# Patient Record
Sex: FEMALE | Race: WHITE | Employment: UNEMPLOYED | URBAN - NONMETROPOLITAN AREA
[De-identification: names, ages, dates, MRNs, and addresses within clinical notes are randomized per-mention and may not be internally consistent; named-entity substitution may affect disease eponyms.]

---

## 2019-10-09 ENCOUNTER — HOSPITAL ENCOUNTER (EMERGENCY)
Age: 59
Discharge: HOME | End: 2019-10-09
Payer: COMMERCIAL

## 2019-10-09 VITALS
WEIGHT: 214 LBS | RESPIRATION RATE: 16 BRPM | BODY MASS INDEX: 34.39 KG/M2 | OXYGEN SATURATION: 98 % | HEIGHT: 66 IN | DIASTOLIC BLOOD PRESSURE: 60 MMHG | SYSTOLIC BLOOD PRESSURE: 126 MMHG | TEMPERATURE: 97 F | HEART RATE: 63 BPM

## 2019-10-09 DIAGNOSIS — J01.00 ACUTE NON-RECURRENT MAXILLARY SINUSITIS: Primary | ICD-10-CM

## 2019-10-09 PROCEDURE — 99203 OFFICE O/P NEW LOW 30 MIN: CPT | Performed by: NURSE PRACTITIONER

## 2019-10-09 PROCEDURE — 99202 OFFICE O/P NEW SF 15 MIN: CPT

## 2019-10-09 RX ORDER — LISINOPRIL 5 MG/1
5 TABLET ORAL DAILY
COMMUNITY
End: 2021-05-12 | Stop reason: ALTCHOICE

## 2019-10-09 RX ORDER — FLUTICASONE PROPIONATE 50 MCG
1 SPRAY, SUSPENSION (ML) NASAL DAILY
Qty: 1 BOTTLE | Refills: 0 | Status: SHIPPED | OUTPATIENT
Start: 2019-10-09 | End: 2021-05-12 | Stop reason: ALTCHOICE

## 2019-10-09 RX ORDER — AMOXICILLIN AND CLAVULANATE POTASSIUM 875; 125 MG/1; MG/1
1 TABLET, FILM COATED ORAL 2 TIMES DAILY
Qty: 14 TABLET | Refills: 0 | Status: SHIPPED | OUTPATIENT
Start: 2019-10-09 | End: 2019-10-16

## 2019-10-09 RX ORDER — DEXTROMETHORPHAN HYDROBROMIDE AND PROMETHAZINE HYDROCHLORIDE 15; 6.25 MG/5ML; MG/5ML
5 SYRUP ORAL 4 TIMES DAILY PRN
Qty: 120 ML | Refills: 0 | Status: SHIPPED | OUTPATIENT
Start: 2019-10-09 | End: 2019-10-16

## 2019-10-09 ASSESSMENT — ENCOUNTER SYMPTOMS
SHORTNESS OF BREATH: 0
RHINORRHEA: 1
SORE THROAT: 1
EYE ITCHING: 0
SINUS PRESSURE: 1
COUGH: 1
EYE DISCHARGE: 0
NAUSEA: 0
DIARRHEA: 0
VOMITING: 0
SINUS PAIN: 1

## 2019-10-09 ASSESSMENT — PAIN DESCRIPTION - LOCATION: LOCATION: FACE

## 2019-10-09 ASSESSMENT — PAIN SCALES - GENERAL: PAINLEVEL_OUTOF10: 8

## 2020-02-18 ENCOUNTER — INITIAL CONSULT (OUTPATIENT)
Dept: NEUROLOGY | Age: 60
End: 2020-02-18
Payer: COMMERCIAL

## 2020-02-18 ENCOUNTER — HOSPITAL ENCOUNTER (OUTPATIENT)
Dept: CT IMAGING | Age: 60
Discharge: HOME | End: 2020-02-18
Payer: COMMERCIAL

## 2020-02-18 ENCOUNTER — HOSPITAL ENCOUNTER (OUTPATIENT)
Dept: MRI IMAGING | Age: 60
Discharge: HOME | End: 2020-02-18
Payer: COMMERCIAL

## 2020-02-18 VITALS
BODY MASS INDEX: 37.09 KG/M2 | SYSTOLIC BLOOD PRESSURE: 124 MMHG | WEIGHT: 230.8 LBS | HEIGHT: 66 IN | HEART RATE: 68 BPM | DIASTOLIC BLOOD PRESSURE: 86 MMHG

## 2020-02-18 PROCEDURE — 99204 OFFICE O/P NEW MOD 45 MIN: CPT | Performed by: PSYCHIATRY & NEUROLOGY

## 2020-02-18 PROCEDURE — 3209999900 MRI COMPARISON OF OUTSIDE FILMS

## 2020-02-18 PROCEDURE — 3209999900 CT COMPARISON OF OUTSIDE FILMS

## 2020-02-18 PROCEDURE — 3017F COLORECTAL CA SCREEN DOC REV: CPT | Performed by: PSYCHIATRY & NEUROLOGY

## 2020-02-18 PROCEDURE — G8427 DOCREV CUR MEDS BY ELIG CLIN: HCPCS | Performed by: PSYCHIATRY & NEUROLOGY

## 2020-02-18 PROCEDURE — 1036F TOBACCO NON-USER: CPT | Performed by: PSYCHIATRY & NEUROLOGY

## 2020-02-18 PROCEDURE — G8484 FLU IMMUNIZE NO ADMIN: HCPCS | Performed by: PSYCHIATRY & NEUROLOGY

## 2020-02-18 PROCEDURE — G8417 CALC BMI ABV UP PARAM F/U: HCPCS | Performed by: PSYCHIATRY & NEUROLOGY

## 2020-02-18 RX ORDER — ACETAMINOPHEN 160 MG
TABLET,DISINTEGRATING ORAL DAILY
COMMUNITY

## 2020-02-18 RX ORDER — ONDANSETRON HYDROCHLORIDE 8 MG/1
8 TABLET, FILM COATED ORAL EVERY 8 HOURS PRN
COMMUNITY

## 2020-02-18 RX ORDER — ATORVASTATIN CALCIUM 20 MG/1
20 TABLET, FILM COATED ORAL DAILY
COMMUNITY

## 2020-02-18 RX ORDER — MECLIZINE HCL 12.5 MG/1
12.5 TABLET ORAL 3 TIMES DAILY PRN
COMMUNITY

## 2020-02-18 RX ORDER — MULTIVIT WITH MINERALS/LUTEIN
1000 TABLET ORAL 2 TIMES DAILY
COMMUNITY

## 2020-02-18 RX ORDER — TRAZODONE HYDROCHLORIDE 50 MG/1
100 TABLET ORAL NIGHTLY
COMMUNITY

## 2020-02-18 RX ORDER — SENNA PLUS 8.6 MG/1
1 TABLET ORAL 2 TIMES DAILY
COMMUNITY

## 2020-02-18 RX ORDER — OMEPRAZOLE 20 MG/1
20 CAPSULE, DELAYED RELEASE ORAL DAILY PRN
COMMUNITY
End: 2021-08-26 | Stop reason: ALTCHOICE

## 2020-02-18 NOTE — PATIENT INSTRUCTIONS
1. Obtain records from previous neurologist, Dr. Mayra Morley at [facility] and Dr. Scot Farnsworth at [facility]  2. MRI brain W/WO contrast  3. EEG  4. Lamictal level  No driving, swimming, operating heavy machinery or compromising heights until event free for 6 months. Report any new events. Call if any questions. Call with any new symptoms or concerns. Follow up in 1 month.

## 2020-02-18 NOTE — LETTER
135 [facility]  200 [facility] [address] [place] [address]  Dept: [phone]  Dept Fax: [phone]  Loc: [phone]    Malinda Mireles MD        2/18/2020      Patient:  Elvia Guadarrama  MRN:  [id number]  YOB: 1960  Date of Visit:  2/18/2020    Dear Dr. Erasmo Brasher,    Thank you for referring Danny Rowland to me for consultation. Please see attached visit summary with my findings. If you have any questions, please do not hesitate to call me.       Sincerely,         Malinda Mireles MD

## 2020-02-27 ENCOUNTER — HOSPITAL ENCOUNTER (OUTPATIENT)
Dept: NEUROLOGY | Age: 60
Discharge: HOME | End: 2020-02-27
Payer: COMMERCIAL

## 2020-02-27 PROCEDURE — 95816 EEG AWAKE AND DROWSY: CPT

## 2020-02-27 NOTE — PROGRESS NOTES
(FLONASE) 50 MCG/ACT nasal spray 1 spray by Nasal route daily for 7 days 10/9/19 10/16/19  ÁNGELA Madsen - CNP   therapeutic multivitamin-minerals [facility]) tablet Take 1 tablet by mouth daily. Historical Provider, MD   Thiamine HCl (VITAMIN B-1) 100 MG tablet Take 100 mg by mouth daily. Historical Provider, MD   Calcium Carbonate (CALTRATE 600 PO) Take  by mouth 3 times daily. Historical Provider, MD   LamoTRIgine (LAMICTAL PO) Take 200 mg by mouth 2 times daily     Historical Provider, MD   clonazePAM (KLONOPIN) 1 MG tablet Take 1 mg by mouth nightly as needed. Historical Provider, MD   LORazepam (ATIVAN) 1 MG tablet Take 1 mg by mouth as needed.       Historical Provider, MD       Technician: Rennie Carrel 2/27/2020

## 2020-02-28 NOTE — PROCEDURES
[address] [id number]                          ELECTROENCEPHALOGRAM REPORT    PATIENT NAME: Cecily Durham                  :        1960  MED REC NO:   [id number]                           ROOM:  ACCOUNT NO:   [de-identified]                           ADMIT DATE: 2020  PROVIDER:     Nadine Pruitt. Evelina Albarran MD    DATE OF EE2020    REFERRING PROVIDER:  Beckie Alcantar CNP    CLINICAL HISTORY:  A 82-year-old female with history of seizures and  migraines complaining of dizziness. Medications are Ativan, Klonopin, Lamictal, Antivert. CLINICAL INTERPRETATION:  This is a 17-channel EEG performed without  sleep deprivation. Hyperventilation and photic stimulation were  performed. The patient is described as alert. Background rhythm activity is noted to be 10 Hz in the posterior  parietal area, symmetric, well modulated, attenuates with eye opening. Hyperventilation was performed for 3 minutes without abnormality. Photic stimulation was performed with driving seen through some of the  frequencies. There was no evidence of epileptiform activity appreciated  throughout this recording. IMPRESSION:  This is a normal EEG. There was no evidence of  epileptiform activity appreciated.         Jaylin Roblero MD    D: 2020 10:25:45       T: 2020 10:36:05     [email or web address]  Job#: [id number]     Doc#: [id number]    CC:

## 2020-03-06 NOTE — PROGRESS NOTES
Chief Complaint   Patient presents with    Consultation     Seizures, vertigo         Catihe Cavanaugh is a 64 y.o. female who presents today for evaluation of seizure since 2009 that has progressively increased in frequency since October 2019. She has been seen by neurology in the past and is establishing local care. Symptoms are moderate and intermittent. Onset is sudden. Modifiers are unknown. She describes her typical seizure is she can feel funny and then blacks out and has entire body shaking. She denies any tongue biting, no incontinence of bowel or bladder. She denies any injury. She is on Lamictal 200 mg twice a day. She has also been on Dilantin in the past. Since October, she feels she is having more episodes of lapses in memory and staring. Her last episode was 1 week ago. Frequency is a couple of times a week. She is unsure of the duration of the episode. Her sleep is poor and interrupted, she wakes up feeling well rested. She occasionally takes daytime naps. She does not snore. She denies any alcohol use. She drives. She is unsure when her last EEG was. EEG done 6/8/12 was normal. She  denies chest pain. No shortness of breath, no neck pain. No vision changes. No dysphagia. No fever. No rash. No weight loss. History provided by patient. Past Medical History:   Diagnosis Date    Anxiety     Depression     Seizures ([facility] [address]        There is no problem list on file for this patient.       Allergies   Allergen Reactions    Aspirin-Acetaminophen-Caffeine Anaphylaxis     excedrin    Percocet [Oxycodone-Acetaminophen]        Current Outpatient Medications   Medication Sig Dispense Refill    atorvastatin (LIPITOR) 10 MG tablet Take 10 mg by mouth daily      traZODone (DESYREL) 50 MG tablet Take 100 mg by mouth nightly      Ascorbic Acid (VITAMIN C) 1000 MG tablet Take 1,000 mg by mouth 2 times daily      Cholecalciferol (VITAMIN D3) 50 MCG (2000 UT) CAPS Take by mouth daily      ensure compliance. She drives and she was counseled on the importance of refraining from driving,, swimming operating heavy machinery and compromising heights. The patient asked questions reflecting understanding and agreed with the following plan. Plan      1. Obtain records from previous neurologist, Dr. Wade Hernandez at [facility] and Dr. Rodney Quigley at [facility]  2. MRI brain W/WO contrast  3. EEG  4. Lamictal level  5. No driving, swimming, operating heavy machinery or compromising heights until event free for 6 months. Report any new events. Call if any questions. 6. Call with any new symptoms or concerns. 7. Follow up in 1 month.        Brianna Shields MD

## 2020-03-10 ENCOUNTER — NURSE ONLY (OUTPATIENT)
Dept: LAB | Age: 60
End: 2020-03-10

## 2020-03-11 LAB — LAMOTRIGINE LEVEL: 9.9 UG/ML (ref 2.5–15)

## 2020-03-24 ENCOUNTER — HOSPITAL ENCOUNTER (OUTPATIENT)
Dept: MRI IMAGING | Age: 60
Discharge: HOME | End: 2020-03-24
Payer: COMMERCIAL

## 2020-03-24 PROCEDURE — 70553 MRI BRAIN STEM W/O & W/DYE: CPT

## 2020-03-24 PROCEDURE — A9579 GAD-BASE MR CONTRAST NOS,1ML: HCPCS | Performed by: NURSE PRACTITIONER

## 2020-03-24 PROCEDURE — 6360000004 HC RX CONTRAST MEDICATION: Performed by: NURSE PRACTITIONER

## 2020-03-24 RX ADMIN — GADOTERIDOL 20 ML: 279.3 INJECTION, SOLUTION INTRAVENOUS at 11:00

## 2020-03-25 ENCOUNTER — OFFICE VISIT (OUTPATIENT)
Dept: NEUROLOGY | Age: 60
End: 2020-03-25
Payer: COMMERCIAL

## 2020-03-25 VITALS
DIASTOLIC BLOOD PRESSURE: 82 MMHG | HEART RATE: 60 BPM | SYSTOLIC BLOOD PRESSURE: 118 MMHG | HEIGHT: 66 IN | WEIGHT: 226 LBS | BODY MASS INDEX: 36.32 KG/M2

## 2020-03-25 PROCEDURE — G8417 CALC BMI ABV UP PARAM F/U: HCPCS | Performed by: PSYCHIATRY & NEUROLOGY

## 2020-03-25 PROCEDURE — 99213 OFFICE O/P EST LOW 20 MIN: CPT | Performed by: PSYCHIATRY & NEUROLOGY

## 2020-03-25 PROCEDURE — 1036F TOBACCO NON-USER: CPT | Performed by: PSYCHIATRY & NEUROLOGY

## 2020-03-25 PROCEDURE — G8484 FLU IMMUNIZE NO ADMIN: HCPCS | Performed by: PSYCHIATRY & NEUROLOGY

## 2020-03-25 PROCEDURE — 3017F COLORECTAL CA SCREEN DOC REV: CPT | Performed by: PSYCHIATRY & NEUROLOGY

## 2020-03-25 PROCEDURE — G8427 DOCREV CUR MEDS BY ELIG CLIN: HCPCS | Performed by: PSYCHIATRY & NEUROLOGY

## 2020-03-25 NOTE — PATIENT INSTRUCTIONS
Obtain records from previous neurologist, Dr. Shashi Barry at [facility] and Dr. Darwin Crespo at [facility] with Lamictal 200 mg twice a day  No driving, swimming, operating heavy machinery or compromising heights until event free for 6 months. Report any new events. Call if any questions. Report any new seizure or spells   Follow up in 3 months or sooner if needed. Call if any questions or concerns.
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
## 2020-08-04 ENCOUNTER — HOSPITAL ENCOUNTER (OUTPATIENT)
Age: 60
Discharge: HOME | End: 2020-08-04
Payer: COMMERCIAL

## 2020-08-04 ENCOUNTER — OFFICE VISIT (OUTPATIENT)
Dept: NEUROLOGY | Age: 60
End: 2020-08-04
Payer: COMMERCIAL

## 2020-08-04 VITALS
WEIGHT: 231.2 LBS | HEART RATE: 78 BPM | HEIGHT: 66 IN | SYSTOLIC BLOOD PRESSURE: 130 MMHG | OXYGEN SATURATION: 97 % | BODY MASS INDEX: 37.16 KG/M2 | DIASTOLIC BLOOD PRESSURE: 72 MMHG

## 2020-08-04 LAB
ALBUMIN SERPL-MCNC: 4.3 G/DL (ref 3.5–5.1)
ALP BLD-CCNC: 69 U/L (ref 38–126)
ALT SERPL-CCNC: 16 U/L (ref 11–66)
ANION GAP SERPL CALCULATED.3IONS-SCNC: 12 MEQ/L (ref 8–16)
AST SERPL-CCNC: 19 U/L (ref 5–40)
BILIRUB SERPL-MCNC: 0.6 MG/DL (ref 0.3–1.2)
BUN BLDV-MCNC: 18 MG/DL (ref 7–22)
CALCIUM SERPL-MCNC: 9.8 MG/DL (ref 8.5–10.5)
CHLORIDE BLD-SCNC: 101 MEQ/L (ref 98–111)
CHOLESTEROL, TOTAL: 272 MG/DL (ref 100–199)
CO2: 28 MEQ/L (ref 23–33)
CREAT SERPL-MCNC: 0.9 MG/DL (ref 0.4–1.2)
GFR SERPL CREATININE-BSD FRML MDRD: 64 ML/MIN/1.73M2
GLUCOSE BLD-MCNC: 96 MG/DL (ref 70–108)
HDLC SERPL-MCNC: 87 MG/DL
LDL CHOLESTEROL CALCULATED: 166 MG/DL
POTASSIUM SERPL-SCNC: 4.3 MEQ/L (ref 3.5–5.2)
SODIUM BLD-SCNC: 141 MEQ/L (ref 135–145)
TOTAL PROTEIN: 7 G/DL (ref 6.1–8)
TRIGL SERPL-MCNC: 95 MG/DL (ref 0–199)
TSH SERPL DL<=0.05 MIU/L-ACNC: 1.73 UIU/ML (ref 0.4–4.2)

## 2020-08-04 PROCEDURE — 80061 LIPID PANEL: CPT

## 2020-08-04 PROCEDURE — 3017F COLORECTAL CA SCREEN DOC REV: CPT | Performed by: PSYCHIATRY & NEUROLOGY

## 2020-08-04 PROCEDURE — 99213 OFFICE O/P EST LOW 20 MIN: CPT | Performed by: PSYCHIATRY & NEUROLOGY

## 2020-08-04 PROCEDURE — 84443 ASSAY THYROID STIM HORMONE: CPT

## 2020-08-04 PROCEDURE — G8427 DOCREV CUR MEDS BY ELIG CLIN: HCPCS | Performed by: PSYCHIATRY & NEUROLOGY

## 2020-08-04 PROCEDURE — G8417 CALC BMI ABV UP PARAM F/U: HCPCS | Performed by: PSYCHIATRY & NEUROLOGY

## 2020-08-04 PROCEDURE — 36415 COLL VENOUS BLD VENIPUNCTURE: CPT

## 2020-08-04 PROCEDURE — 1036F TOBACCO NON-USER: CPT | Performed by: PSYCHIATRY & NEUROLOGY

## 2020-08-04 PROCEDURE — 80053 COMPREHEN METABOLIC PANEL: CPT

## 2020-08-04 RX ORDER — OXCARBAZEPINE 150 MG/1
150 TABLET, FILM COATED ORAL 2 TIMES DAILY
Qty: 60 TABLET | Refills: 3 | Status: SHIPPED | OUTPATIENT
Start: 2020-08-04 | End: 2020-09-09
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
## 2020-08-05 ENCOUNTER — HOSPITAL ENCOUNTER (OUTPATIENT)
Age: 60
Discharge: HOME | End: 2020-08-05
Payer: COMMERCIAL

## 2020-08-05 PROCEDURE — 36415 COLL VENOUS BLD VENIPUNCTURE: CPT

## 2020-08-05 PROCEDURE — 80175 DRUG SCREEN QUAN LAMOTRIGINE: CPT

## 2020-08-07 ENCOUNTER — HOSPITAL ENCOUNTER (OUTPATIENT)
Dept: NEUROLOGY | Age: 60
Discharge: HOME | End: 2020-08-07
Payer: COMMERCIAL

## 2020-08-07 PROCEDURE — 95819 EEG AWAKE AND ASLEEP: CPT | Performed by: PSYCHIATRY & NEUROLOGY

## 2020-08-07 PROCEDURE — 95816 EEG AWAKE AND DROWSY: CPT

## 2020-08-07 NOTE — PROGRESS NOTES
[facility] Technician worksheet       EEG Date: 2020    Name: Patsy Smith   : 1960   Age: 64 y.o. SEX: female    Room: OP    MRN: [id number]     CSN: [id number]    Ordering Provider: Montana Sheets  EEG Number: [id number] Time of Test:  13:05    Hand: Right   Sedation: No    H.V. Done: Yes with good effort Photic: Yes then aborted    Sleep: No   Drowsy: Yes   Sleep Deprived: No    Seizures observed: no clinical    Mentality: alert  And  very dizzy when sat up     Clinical History: Seizure  MRI 3/24/20:  Impression     Normal MRI of the brain.             Past Medical History:       Diagnosis Date    Anxiety     Depression     Seizures ([facility] [address]          Prior to Admission medications    Medication Sig Start Date End Date Taking?  Authorizing Provider   OXcarbazepine (TRILEPTAL) 150 MG tablet Take 1 tablet by mouth 2 times daily 20   Gilmar Anand MD   atorvastatin (LIPITOR) 10 MG tablet Take 10 mg by mouth daily    Historical Provider, MD   traZODone (DESYREL) 50 MG tablet Take 100 mg by mouth nightly    Historical Provider, MD   Ascorbic Acid (VITAMIN C) 1000 MG tablet Take 1,000 mg by mouth 2 times daily    Historical Provider, MD   Cholecalciferol (VITAMIN D3) 50 MCG ( UT) CAPS Take by mouth daily    Historical Provider, MD   GLUCOSAMINE-CHONDROITIN PO Take 1,000 mg by mouth daily    Historical Provider, MD   senna (SENOKOT) 8.6 MG tablet Take 1 tablet by mouth 2 times daily    Historical Provider, MD   omeprazole (PRILOSEC) 20 MG delayed release capsule Take 20 mg by mouth daily as needed    Historical Provider, MD   ondansetron (ZOFRAN) 8 MG tablet Take 8 mg by mouth every 8 hours as needed for Nausea or Vomiting    Historical Provider, MD   meclizine (ANTIVERT) 12.5 MG tablet Take 12.5 mg by mouth 3 times daily as needed    Historical Provider, MD   FLUoxetine HCl (PROZAC PO) Take 40 mg by mouth daily     Historical Provider, MD   lisinopril (PRINIVIL;ZESTRIL) 5 MG tablet Take 5 mg by mouth daily    Historical Provider, MD   fluticasone (FLONASE) 50 MCG/ACT nasal spray 1 spray by Nasal route daily for 7 days 10/9/19 10/16/19  ÁNGELA Mackenzie Cha - CNP   therapeutic multivitamin-minerals [facility]) tablet Take 1 tablet by mouth daily. Historical Provider, MD   Calcium Carbonate (CALTRATE 600 PO) Take by mouth daily     Historical Provider, MD   LamoTRIgine (LAMICTAL PO) Take 200 mg by mouth 2 times daily     Historical Provider, MD   clonazePAM (KLONOPIN) 1 MG tablet Take 1 mg by mouth nightly as needed.       Historical Provider, MD       Technician: Leonard Martínez 8/7/2020

## 2020-08-08 LAB — LAMOTRIGINE LEVEL: 9.5 UG/ML (ref 2.5–15)

## 2020-08-09 NOTE — PROCEDURES
[address] [id number]                          ELECTROENCEPHALOGRAM REPORT    PATIENT NAME: Maria Elena Villalba                  :        1960  MED REC NO:   [id number]                           ROOM:  ACCOUNT NO:   [de-identified]                           ADMIT DATE: 2020  PROVIDER:     Malena Reagan. Moreno Díaz MD    DATE OF EE2020    REFERRING PROVIDER:  Malena Reagan. Moreno Díaz MD    CLINICAL HISTORY:  A 53-year-old female presenting with seizure,  developing episodes of dizziness. Medications listed are Trileptal, Lipitor, Desyrel, vitamin C, vitamin  D, Senokot, Prilosec, Zofran, Antivert, fluoxetine, Zestril, Flonase,  Caltrate, Lamictal, Klonopin. This is a 17-channel EEG performed without sleep deprivation. Hyperventilation and photic stimulation was performed. The patient is  described as alert. The background rhythm activity was noted to be 9 to 10 Hz in the  posterior parietal area, symmetric, attenuates with eye opening. Hyperventilation was performed for 3 minutes with good effort without  abnormality. Light stages of sleep are seen during the recording. The  patient noted to be drowsy during parts of recording. Photic  stimulation was performed partially without abnormality. There was no  evidence of epileptiform activity appreciated. IMPRESSION:  This is a normal EEG. There was no evidence of  epileptiform activity appreciated.         Cherie Patton MD    D: 2020 9:25:10       T: 2020 9:27:18     [email or web address]  Job#: [id number]     Doc#: [id number]    CC:

## 2020-08-17 ENCOUNTER — TELEPHONE (OUTPATIENT)
Dept: NEUROLOGY | Age: 60
End: 2020-08-17

## 2020-08-17 NOTE — TELEPHONE ENCOUNTER
Patient called stating she is taking Trileptal 150 mg BID for 2 weeks now. She feels the Trileptal is causing fatigue, brain fog, dizziness, and balance issues. She fell 2 days ago. She denies injury. She tried only taking Trileptal 150 mg once daily on her own, and symptoms did not improve. She has tried Dilantin, Topamax, and Keppra in the past. She is also currently taking Lamictal 200 mg 2 times a day. Please advise. Thank you.

## 2020-08-26 NOTE — TELEPHONE ENCOUNTER
Spoke with patient who stated she is still taking Trileptal 150 mg 2 times a day. She feels the Trileptal is making her dizziness worse. She continues to have staring spells. She is also on Lamictal 200 mg 2 times a day. She has tried Dilantin, Topamax, and Keppra in the past. She is scheduled for follow up on 9/9/20.

## 2020-09-09 ENCOUNTER — OFFICE VISIT (OUTPATIENT)
Dept: NEUROLOGY | Age: 60
End: 2020-09-09
Payer: COMMERCIAL

## 2020-09-09 VITALS
SYSTOLIC BLOOD PRESSURE: 136 MMHG | BODY MASS INDEX: 37.03 KG/M2 | HEART RATE: 69 BPM | WEIGHT: 230.4 LBS | DIASTOLIC BLOOD PRESSURE: 92 MMHG | HEIGHT: 66 IN

## 2020-09-09 PROCEDURE — G8427 DOCREV CUR MEDS BY ELIG CLIN: HCPCS | Performed by: NURSE PRACTITIONER

## 2020-09-09 PROCEDURE — 3017F COLORECTAL CA SCREEN DOC REV: CPT | Performed by: NURSE PRACTITIONER

## 2020-09-09 PROCEDURE — G8417 CALC BMI ABV UP PARAM F/U: HCPCS | Performed by: NURSE PRACTITIONER

## 2020-09-09 PROCEDURE — 1036F TOBACCO NON-USER: CPT | Performed by: NURSE PRACTITIONER

## 2020-09-09 PROCEDURE — 99213 OFFICE O/P EST LOW 20 MIN: CPT | Performed by: NURSE PRACTITIONER

## 2020-09-09 RX ORDER — LAMOTRIGINE 100 MG/1
250 TABLET ORAL 2 TIMES DAILY
Qty: 150 TABLET | Refills: 3 | Status: SHIPPED | OUTPATIENT
Start: 2020-09-09 | End: 2021-04-13 | Stop reason: SDUPTHER

## 2020-09-09 NOTE — PROGRESS NOTES
NEUROLOGY OUT PATIENT FOLLOW UP NOTE:  9/9/202012:39 PM    Reggie Hawkins is here for follow up for seizure. She reports she had an episode of seizure 4 weeks ago. Her cat jumped on her and she got scared and fell to the ground. She did not lose consciousness, but did have incontinence of bladder. No tongue biting or injury noted. She was confused and feels her confusion has increased in the past 3 months. She was tried on trileptal, but stopped taking it due to fatigue and confusion. She is on Lamictal and is compliant with taking her medication. No side effects noted. She comes in today to discuss results of testing performed as well as medications options and the plan of care going forward. ROS:  Respiratory : no cough, no shortness of breath  Cardiac: no chest pain. No palpitations.   Renal : no flank pain, no hematuria    Skin: no rash      Allergies   Allergen Reactions    Aspirin-Acetaminophen-Caffeine Anaphylaxis     excedrin    Percocet [Oxycodone-Acetaminophen]        Current Outpatient Medications:     atorvastatin (LIPITOR) 20 MG tablet, Take 20 mg by mouth daily , Disp: , Rfl:     traZODone (DESYREL) 50 MG tablet, Take 100 mg by mouth nightly, Disp: , Rfl:     Ascorbic Acid (VITAMIN C) 1000 MG tablet, Take 1,000 mg by mouth 2 times daily, Disp: , Rfl:     Cholecalciferol (VITAMIN D3) 50 MCG (2000 UT) CAPS, Take by mouth daily, Disp: , Rfl:     GLUCOSAMINE-CHONDROITIN PO, Take 1,000 mg by mouth daily, Disp: , Rfl:     senna (SENOKOT) 8.6 MG tablet, Take 1 tablet by mouth 2 times daily, Disp: , Rfl:     omeprazole (PRILOSEC) 20 MG delayed release capsule, Take 20 mg by mouth daily as needed, Disp: , Rfl:     ondansetron (ZOFRAN) 8 MG tablet, Take 8 mg by mouth every 8 hours as needed for Nausea or Vomiting, Disp: , Rfl:     meclizine (ANTIVERT) 12.5 MG tablet, Take 12.5 mg by mouth 3 times daily as needed, Disp: , Rfl:     FLUoxetine HCl (PROZAC PO), Take 40 mg by mouth daily , Disp: , Rfl:     lisinopril (PRINIVIL;ZESTRIL) 5 MG tablet, Take 5 mg by mouth daily, Disp: , Rfl:     fluticasone (FLONASE) 50 MCG/ACT nasal spray, 1 spray by Nasal route daily for 7 days, Disp: 1 Bottle, Rfl: 0    therapeutic multivitamin-minerals (THERAGRAN-M) tablet, Take 1 tablet by mouth daily. , Disp: , Rfl:     Calcium Carbonate (CALTRATE 600 PO), Take by mouth daily , Disp: , Rfl:     LamoTRIgine (LAMICTAL PO), Take 200 mg by mouth 2 times daily , Disp: , Rfl:     clonazePAM (KLONOPIN) 1 MG tablet, Take 1 mg by mouth nightly as needed. , Disp: , Rfl:     OXcarbazepine (TRILEPTAL) 150 MG tablet, Take 1 tablet by mouth 2 times daily (Patient not taking: Reported on 9/9/2020), Disp: 60 tablet, Rfl: 3      PE:   Vitals:    09/09/20 1226 09/09/20 1233   BP: (!) 138/92 (!) 136/92   Pulse: 69    Weight: 230 lb 6.4 oz (104.5 kg)    Height: 5' 6\" (1.676 m)      General Appearance:  awake, alert, oriented, in no acute distress  Gen: NAD, Language is Intact. Skin: no rash, lesion, dry  to touch. warm  Head: no rash, no icterus  Neck: There is no carotid bruits. The Neck is supple. There is no neck lymphadenopathy. Neuro: CN 2-12 grossly intact with no focal deficits. Power 5/5 Throughout symmetric, Reflexes are  symmetric. Long tracts are intact. Cerebellar exam is Intact. Sensory exam is intact to light touch. Gait is intact. Musculoskeletal:  Has no hand arthritis, no limitation of ROM in any of the four extremities. Lower extremities no edema  The abdomen is soft,  intact bowel sounds. DATA:  Results for orders placed or performed during the hospital encounter of 08/05/20   Lamotrigine Level   Result Value Ref Range    Lamotrigine Lvl 9.5 2.5 - 15.0 ug/mL      EEG done 8/7/2020:  IMPRESSION:  This is a normal EEG. There was no evidence of  epileptiform activity appreciated. Assessment:     Diagnosis Orders   1. Seizure [facility])        She reports she had an episode of seizure 4 weeks ago.  Her cat jumped on her and she got scared and fell to the ground. She did not lose consciousness, but did have incontinence of bladder. No tongue biting or injury noted. She was confused and feels her confusion has increased in the past 3 months. She was tried on trileptal, but stopped taking it due to fatigue and confusion. She is on Lamictal and is compliant with taking her medication. Lamictal level done 8/5/20=9.5. She also had an EEG done 8/7/2020 that was normal. She has had several epilepsy monitoring sessions, that revealed nonepileptic in addition to epileptic events. After detailed discussion with patient we agreed on the following plan. Plan:  1. Change Lamictal to 250 mg twice a day  2. Repeat Lamictal level in 1 week  3. CBC  4. Consider Vimpat as a next step  5. No driving, swimming, operating heavy machinery or compromising heights until cleared  6. Report any new seizure or spells  7. Follow up in 2-3 months or sooner if needed. 8. Call if any questions or concerns. Please call if any questions.      Camila Sanchez CNP

## 2020-09-25 ENCOUNTER — HOSPITAL ENCOUNTER (OUTPATIENT)
Age: 60
Discharge: HOME | End: 2020-09-25
Payer: COMMERCIAL

## 2020-09-25 LAB
BASOPHILS # BLD: 0.7 %
BASOPHILS ABSOLUTE: 0.1 THOU/MM3 (ref 0–0.1)
EOSINOPHIL # BLD: 3 %
EOSINOPHILS ABSOLUTE: 0.3 THOU/MM3 (ref 0–0.4)
ERYTHROCYTE [DISTWIDTH] IN BLOOD BY AUTOMATED COUNT: 14.9 % (ref 11.5–14.5)
ERYTHROCYTE [DISTWIDTH] IN BLOOD BY AUTOMATED COUNT: 53.4 FL (ref 35–45)
HCT VFR BLD CALC: 44.8 % (ref 37–47)
HEMOGLOBIN: 14.5 GM/DL (ref 12–16)
IMMATURE GRANS (ABS): 0.04 THOU/MM3 (ref 0–0.07)
IMMATURE GRANULOCYTES: 0.4 %
LYMPHOCYTES # BLD: 31.3 %
LYMPHOCYTES ABSOLUTE: 3.2 THOU/MM3 (ref 1–4.8)
MCH RBC QN AUTO: 31.7 PG (ref 26–33)
MCHC RBC AUTO-ENTMCNC: 32.4 GM/DL (ref 32.2–35.5)
MCV RBC AUTO: 97.8 FL (ref 81–99)
MONOCYTES # BLD: 10.2 %
MONOCYTES ABSOLUTE: 1.1 THOU/MM3 (ref 0.4–1.3)
NUCLEATED RED BLOOD CELLS: 0 /100 WBC
PLATELET # BLD: 464 THOU/MM3 (ref 130–400)
PMV BLD AUTO: 10.2 FL (ref 9.4–12.4)
RBC # BLD: 4.58 MILL/MM3 (ref 4.2–5.4)
SEG NEUTROPHILS: 54.4 %
SEGMENTED NEUTROPHILS ABSOLUTE COUNT: 5.6 THOU/MM3 (ref 1.8–7.7)
WBC # BLD: 10.3 THOU/MM3 (ref 4.8–10.8)

## 2020-09-25 PROCEDURE — 85025 COMPLETE CBC W/AUTO DIFF WBC: CPT

## 2020-09-25 PROCEDURE — 36415 COLL VENOUS BLD VENIPUNCTURE: CPT

## 2020-10-27 ENCOUNTER — HOSPITAL ENCOUNTER (OUTPATIENT)
Dept: WOMENS IMAGING | Age: 60
Discharge: HOME | End: 2020-10-27
Payer: COMMERCIAL

## 2020-10-27 PROCEDURE — 77063 BREAST TOMOSYNTHESIS BI: CPT

## 2020-11-03 ENCOUNTER — HOSPITAL ENCOUNTER (OUTPATIENT)
Dept: WOMENS IMAGING | Age: 60
Discharge: HOME | End: 2020-11-03
Payer: COMMERCIAL

## 2020-11-03 PROCEDURE — 3209999900 MAM COMPARISON OF OUTSIDE IMAGES

## 2020-11-09 ENCOUNTER — APPOINTMENT (OUTPATIENT)
Dept: CT IMAGING | Age: 60
End: 2020-11-09
Payer: COMMERCIAL

## 2020-11-09 ENCOUNTER — HOSPITAL ENCOUNTER (EMERGENCY)
Age: 60
Discharge: HOME | End: 2020-11-09
Attending: FAMILY MEDICINE
Payer: COMMERCIAL

## 2020-11-09 VITALS
BODY MASS INDEX: 36.96 KG/M2 | TEMPERATURE: 98.8 F | DIASTOLIC BLOOD PRESSURE: 81 MMHG | WEIGHT: 230 LBS | HEART RATE: 68 BPM | OXYGEN SATURATION: 98 % | HEIGHT: 66 IN | SYSTOLIC BLOOD PRESSURE: 116 MMHG | RESPIRATION RATE: 16 BRPM

## 2020-11-09 LAB
ACETAMINOPHEN LEVEL: < 5 UG/ML (ref 0–20)
ALBUMIN SERPL-MCNC: 4.6 G/DL (ref 3.5–5.1)
ALP BLD-CCNC: 73 U/L (ref 38–126)
ALT SERPL-CCNC: 23 U/L (ref 11–66)
AMPHETAMINE+METHAMPHETAMINE URINE SCREEN: NEGATIVE
ANION GAP SERPL CALCULATED.3IONS-SCNC: 12 MEQ/L (ref 8–16)
APTT: 27.5 SECONDS (ref 22–38)
AST SERPL-CCNC: 31 U/L (ref 5–40)
BARBITURATE QUANTITATIVE URINE: NEGATIVE
BASOPHILS # BLD: 0.8 %
BASOPHILS ABSOLUTE: 0.1 THOU/MM3 (ref 0–0.1)
BENZODIAZEPINE QUANTITATIVE URINE: NEGATIVE
BILIRUB SERPL-MCNC: 0.3 MG/DL (ref 0.3–1.2)
BILIRUBIN DIRECT: < 0.2 MG/DL (ref 0–0.3)
BUN BLDV-MCNC: 16 MG/DL (ref 7–22)
CALCIUM IONIZED: 1.12 MMOL/L (ref 1.12–1.32)
CALCIUM SERPL-MCNC: 9.8 MG/DL (ref 8.5–10.5)
CANNABINOID QUANTITATIVE URINE: NEGATIVE
CHLORIDE BLD-SCNC: 98 MEQ/L (ref 98–111)
CO2: 25 MEQ/L (ref 23–33)
COCAINE METABOLITE QUANTITATIVE URINE: NEGATIVE
CREAT SERPL-MCNC: 0.8 MG/DL (ref 0.4–1.2)
EKG ATRIAL RATE: 62 BPM
EKG P AXIS: 44 DEGREES
EKG P-R INTERVAL: 164 MS
EKG Q-T INTERVAL: 424 MS
EKG QRS DURATION: 84 MS
EKG QTC CALCULATION (BAZETT): 430 MS
EKG R AXIS: 28 DEGREES
EKG T AXIS: 39 DEGREES
EKG VENTRICULAR RATE: 62 BPM
EOSINOPHIL # BLD: 2 %
EOSINOPHILS ABSOLUTE: 0.2 THOU/MM3 (ref 0–0.4)
ERYTHROCYTE [DISTWIDTH] IN BLOOD BY AUTOMATED COUNT: 14.1 % (ref 11.5–14.5)
ERYTHROCYTE [DISTWIDTH] IN BLOOD BY AUTOMATED COUNT: 49.7 FL (ref 35–45)
ETHYL ALCOHOL, SERUM: < 0.01 %
GFR SERPL CREATININE-BSD FRML MDRD: 74 ML/MIN/1.73M2
GLUCOSE BLD-MCNC: 101 MG/DL (ref 70–108)
GLUCOSE BLD-MCNC: 106 MG/DL (ref 70–108)
HCT VFR BLD CALC: 44.7 % (ref 37–47)
HEMOGLOBIN: 14.6 GM/DL (ref 12–16)
IMMATURE GRANS (ABS): 0.01 THOU/MM3 (ref 0–0.07)
IMMATURE GRANULOCYTES: 0.1 %
INR BLD: 1.04 (ref 0.85–1.13)
LYMPHOCYTES # BLD: 34 %
LYMPHOCYTES ABSOLUTE: 2.7 THOU/MM3 (ref 1–4.8)
MAGNESIUM: 2.3 MG/DL (ref 1.6–2.4)
MCH RBC QN AUTO: 31.3 PG (ref 26–33)
MCHC RBC AUTO-ENTMCNC: 32.7 GM/DL (ref 32.2–35.5)
MCV RBC AUTO: 95.9 FL (ref 81–99)
MONOCYTES # BLD: 12.8 %
MONOCYTES ABSOLUTE: 1 THOU/MM3 (ref 0.4–1.3)
NUCLEATED RED BLOOD CELLS: 0 /100 WBC
OPIATES, URINE: NEGATIVE
OSMOLALITY CALCULATION: 271.7 MOSMOL/KG (ref 275–300)
OXYCODONE: NEGATIVE
PHENCYCLIDINE QUANTITATIVE URINE: NEGATIVE
PLATELET # BLD: 370 THOU/MM3 (ref 130–400)
PMV BLD AUTO: 11 FL (ref 9.4–12.4)
POTASSIUM SERPL-SCNC: 4.4 MEQ/L (ref 3.5–5.2)
RBC # BLD: 4.66 MILL/MM3 (ref 4.2–5.4)
SALICYLATE, SERUM: < 0.3 MG/DL (ref 2–10)
SARS-COV-2, NAAT: NOT DETECTED
SEG NEUTROPHILS: 50.3 %
SEGMENTED NEUTROPHILS ABSOLUTE COUNT: 4 THOU/MM3 (ref 1.8–7.7)
SODIUM BLD-SCNC: 135 MEQ/L (ref 135–145)
TOTAL PROTEIN: 7.1 G/DL (ref 6.1–8)
TSH SERPL DL<=0.05 MIU/L-ACNC: 1.65 UIU/ML (ref 0.4–4.2)
WBC # BLD: 7.9 THOU/MM3 (ref 4.8–10.8)

## 2020-11-09 PROCEDURE — 99284 EMERGENCY DEPT VISIT MOD MDM: CPT

## 2020-11-09 PROCEDURE — 82248 BILIRUBIN DIRECT: CPT

## 2020-11-09 PROCEDURE — 85025 COMPLETE CBC W/AUTO DIFF WBC: CPT

## 2020-11-09 PROCEDURE — 93005 ELECTROCARDIOGRAM TRACING: CPT | Performed by: FAMILY MEDICINE

## 2020-11-09 PROCEDURE — 85730 THROMBOPLASTIN TIME PARTIAL: CPT

## 2020-11-09 PROCEDURE — 84443 ASSAY THYROID STIM HORMONE: CPT

## 2020-11-09 PROCEDURE — 2580000003 HC RX 258: Performed by: FAMILY MEDICINE

## 2020-11-09 PROCEDURE — G0480 DRUG TEST DEF 1-7 CLASSES: HCPCS

## 2020-11-09 PROCEDURE — 83735 ASSAY OF MAGNESIUM: CPT

## 2020-11-09 PROCEDURE — 93010 ELECTROCARDIOGRAM REPORT: CPT | Performed by: NUCLEAR MEDICINE

## 2020-11-09 PROCEDURE — 70450 CT HEAD/BRAIN W/O DYE: CPT

## 2020-11-09 PROCEDURE — 80053 COMPREHEN METABOLIC PANEL: CPT

## 2020-11-09 PROCEDURE — 82330 ASSAY OF CALCIUM: CPT

## 2020-11-09 PROCEDURE — 36415 COLL VENOUS BLD VENIPUNCTURE: CPT

## 2020-11-09 PROCEDURE — 82948 REAGENT STRIP/BLOOD GLUCOSE: CPT

## 2020-11-09 PROCEDURE — 80307 DRUG TEST PRSMV CHEM ANLYZR: CPT

## 2020-11-09 PROCEDURE — 85610 PROTHROMBIN TIME: CPT

## 2020-11-09 PROCEDURE — U0002 COVID-19 LAB TEST NON-CDC: HCPCS

## 2020-11-09 RX ORDER — SODIUM CHLORIDE 9 MG/ML
INJECTION, SOLUTION INTRAVENOUS CONTINUOUS
Status: DISCONTINUED | OUTPATIENT
Start: 2020-11-09 | End: 2020-11-09 | Stop reason: HOSPADM

## 2020-11-09 RX ORDER — 0.9 % SODIUM CHLORIDE 0.9 %
1000 INTRAVENOUS SOLUTION INTRAVENOUS ONCE
Status: DISCONTINUED | OUTPATIENT
Start: 2020-11-09 | End: 2020-11-09 | Stop reason: HOSPADM

## 2020-11-09 RX ADMIN — SODIUM CHLORIDE: 9 INJECTION, SOLUTION INTRAVENOUS at 16:22

## 2020-11-09 ASSESSMENT — PAIN SCALES - GENERAL: PAINLEVEL_OUTOF10: 2

## 2020-11-09 ASSESSMENT — VISUAL ACUITY
OS: 20/50
OD: 20/50
OU: 20/40

## 2020-11-09 ASSESSMENT — PAIN DESCRIPTION - LOCATION: LOCATION: HEAD

## 2020-11-09 ASSESSMENT — ENCOUNTER SYMPTOMS
NAUSEA: 0
ABDOMINAL PAIN: 0
SHORTNESS OF BREATH: 0
TROUBLE SWALLOWING: 0
WHEEZING: 0
DIARRHEA: 0
BLOOD IN STOOL: 0
VOMITING: 0

## 2020-11-09 ASSESSMENT — PAIN DESCRIPTION - ORIENTATION: ORIENTATION: ANTERIOR

## 2020-11-09 ASSESSMENT — PAIN DESCRIPTION - PAIN TYPE: TYPE: ACUTE PAIN

## 2020-11-09 ASSESSMENT — PAIN DESCRIPTION - DESCRIPTORS: DESCRIPTORS: DULL

## 2020-11-09 NOTE — ED NOTES
Pt awaiting provider re-eval at this time. Vitals remain stable.  Call light in reach     Reginald Rivera RN  11/09/20 8443

## 2020-11-09 NOTE — ED TRIAGE NOTES
Pt presents to the ED through triage with c/o vision changes. Pt states she developed fuzzy vision around her eyes bilaterally, accompanied by a dull headache, rating the pain 2/10. Pt states these symptoms started on Saturday. Pt takes meclizine ad Lamictal at home currently. Vitals stable.

## 2020-11-09 NOTE — ED PROVIDER NOTES
[facility]  eMERGENCY dEPARTMENT eNCOUnter          CHIEF COMPLAINT       Chief Complaint   Patient presents with    Eye Problem       Nurses Notes reviewed and I agree except as noted in the HPI. HISTORY OF PRESENT ILLNESS    Bartolo Campa is a 64 y.o. female who presents with generalized weakness    Location/Symptom: Generalized weakness  Timing/Onset: Saturday, 11/7/2020  Context/Setting: She is a history of seizures  On Lamictal 250 twice daily  No new medications  Non-smoker  Quality: She noticed a feeling of fatigue and tiredness on Saturday, 11/7/2020  She felt like both her arms were heavy her legs were weak some aching pains in the muscles  No fever or chills  No rhinitis cough cold or sore throat  Over the weekend symptoms stayed about the same  She got a little bit of blurred vision  She does wear glasses  Both eyes seem to be a little blurred  She had very mild bifrontal headache  Duration: 2 days  Modifying Factors: None   Severity: 4/10    REVIEW OF SYSTEMS     Review of Systems   Constitutional: Positive for fatigue. Negative for chills and fever. HENT: Negative for congestion and trouble swallowing. Eyes:        Some blurred vision both eyes    No eye pain    No eye discharge   Respiratory: Negative for shortness of breath and wheezing. Cardiovascular: Negative for chest pain, palpitations and leg swelling. Gastrointestinal: Negative for abdominal pain, blood in stool, diarrhea, nausea and vomiting. Genitourinary: Negative for difficulty urinating, dysuria, frequency and urgency. Musculoskeletal: Positive for myalgias. Negative for joint swelling. Skin: Negative for rash. Neurological: Positive for seizures and headaches. Mild bifrontal headache    History of seizures but no observed seizure this weekend    Sometimes she has \"staring spells\"   Hematological: Negative for adenopathy. Psychiatric/Behavioral: Negative for confusion.           PAST MEDICAL HISTORY    has a past medical history of Anxiety, Depression, and Seizures ([facility] [address] SURGICAL HISTORY      has a past surgical history that includes Spine surgery; Wrist ganglion excision; cyst removal; tumor removal (1974); knee surgery; and Splenectomy. CURRENT MEDICATIONS       Discharge Medication List as of 11/9/2020  6:13 PM      CONTINUE these medications which have NOT CHANGED    Details   lamoTRIgine (LAMICTAL) 100 MG tablet Take 2.5 tablets by mouth 2 times daily, Disp-150 tablet,R-3Normal      meclizine (ANTIVERT) 12.5 MG tablet Take 12.5 mg by mouth 3 times daily as neededHistorical Med      clonazePAM (KLONOPIN) 1 MG tablet Take 1 mg by mouth nightly as needed. atorvastatin (LIPITOR) 20 MG tablet Take 20 mg by mouth daily Historical Med      traZODone (DESYREL) 50 MG tablet Take 100 mg by mouth nightlyHistorical Med      Ascorbic Acid (VITAMIN C) 1000 MG tablet Take 1,000 mg by mouth 2 times dailyHistorical Med      Cholecalciferol (VITAMIN D3) 50 MCG (2000 UT) CAPS Take by mouth dailyHistorical Med      GLUCOSAMINE-CHONDROITIN PO Take 1,000 mg by mouth dailyHistorical Med      senna (SENOKOT) 8.6 MG tablet Take 1 tablet by mouth 2 times dailyHistorical Med      omeprazole (PRILOSEC) 20 MG delayed release capsule Take 20 mg by mouth daily as neededHistorical Med      ondansetron (ZOFRAN) 8 MG tablet Take 8 mg by mouth every 8 hours as needed for Nausea or VomitingHistorical Med      FLUoxetine HCl (PROZAC PO) Take 40 mg by mouth daily Historical Med      lisinopril (PRINIVIL;ZESTRIL) 5 MG tablet Take 5 mg by mouth dailyHistorical Med      fluticasone (FLONASE) 50 MCG/ACT nasal spray 1 spray by Nasal route daily for 7 days, Disp-1 Bottle,R-0Normal      therapeutic multivitamin-minerals (THERAGRAN-M) tablet Take 1 tablet by mouth daily.         Calcium Carbonate (CALTRATE 600 PO) Take by mouth daily Historical Med             ALLERGIES     is allergic to aspirin-acetaminophen-caffeine and percocet [oxycodone-acetaminophen]. FAMILY HISTORY     She indicated that the status of her mother is unknown. She indicated that the status of her father is unknown. She indicated that the status of her maternal grandmother is unknown. She indicated that the status of her paternal grandfather is unknown. She indicated that the status of her maternal aunt is unknown.   family history includes Arthritis in her maternal grandmother; Cancer in her maternal aunt and mother; Depression in her mother; Diabetes in her father; Early Death in her father and mother; Heart Disease in her father and mother; High Blood Pressure in her father and mother; High Cholesterol in her father and mother; Kidney Disease in her father; Learning Disabilities in her father; Stroke in her paternal grandfather; Substance Abuse in her father. SOCIAL HISTORY      reports that she has never smoked. She has never used smokeless tobacco. She reports current alcohol use. She reports that she does not use drugs. PHYSICAL EXAM     INITIAL VITALS:  height is 5' 6\" (1.676 m) and weight is 230 lb (104.3 kg). Her oral temperature is 98.8 °F (37.1 °C). Her blood pressure is 116/81 and her pulse is 68. Her respiration is 16 and oxygen saturation is 98%. Physical Exam  Vitals signs and nursing note reviewed. Constitutional:       Comments: GCS 15   HENT:      Head: Normocephalic and atraumatic. Eyes:      Extraocular Movements: Extraocular movements intact. Pupils: Pupils are equal, round, and reactive to light. Comments: Extract movements intact    Visual fields by confrontation normal    No conjunctivitis    Corneas sharp    Fundi show disc margins to be sharp arteries and veins normal       Neck:      Musculoskeletal: Normal range of motion and neck supple. No neck rigidity. Comments: No meningismus    Trachea midline    No adenopathy  Cardiovascular:      Rate and Rhythm: Normal rate and regular rhythm.       Pulses: Normal pulses. Heart sounds: Normal heart sounds. Pulmonary:      Effort: Pulmonary effort is normal.      Breath sounds: Normal breath sounds. No wheezing. Abdominal:      Palpations: Abdomen is soft. Tenderness: There is no abdominal tenderness. There is no guarding or rebound. Neurological:      General: No focal deficit present. Mental Status: She is alert. Motor: No weakness. Comments: INITIAL NIH STROKE SCALE    Time Performed:  4:00 PM     1a. Level of consciousness:  0 - alert; keenly responsive  1b. Level of consciousness questions:  0 - answers both questions correctly  1c. Level of consciousness questions:  0 - performs both tasks correctly  2. Best Gaze:  0 - normal  3. Visual:  0 - no visual loss  4. Facial Palsy:  0 - normal symmetric movement  5a. Motor left arm:  0 - no drift, limb holds 90 (or 45) degrees for full 10 seconds  5b. Motor right arm:  0 - no drift, limb holds 90 (or 45) degrees for full 10 seconds  6a. Motor left le - no drift; leg holds 30 degree position for full 5 seconds  6b. Motor right le - no drift; leg holds 30 degree position for full 5 seconds  7. Limb Ataxia:  0 - absent  8. Sensory:  0 - normal; no sensory loss  9. Best Language:  0 - no aphasia, normal  10. Dysarthria:  0 - normal  11. Extinction and Inattention:  0 - no abnormality    TOTAL:  0       Psychiatric:         Mood and Affect: Mood normal.         Behavior: Behavior normal.         Thought Content:  Thought content normal.           DIFFERENTIAL DIAGNOSIS:     2 days of global weakness, but no lateralizing quality    Fatigue tiredness    Blurred vision consider diabetes   will check acuity    Consider intoxication/drug ingestion    Consider orthostasis    This does not appear to be ischemic CNS event though CT scan of brain was obtained    Consider subtle seizure activity    consider Covid infection      DIAGNOSTIC RESULTS     EKG: All EKG's are interpreted by the Emergency Department Physician who either signs or Co-signs this chart in the absence of a cardiologist.    EKG showed sinus rhythm with rate 62. QRS complexes show normal axis, normal no acute change conduction.   ST-T waves show no acute change        RADIOLOGY: non-plain film images(s) such as CT, Ultrasound and MRI are read by the radiologist.  The patient had a multiple view CT scan of the brain which demonstrates no acute process      [x] Visualized and interpreted by me   [x] Radiologist's Wet Read Report Reviewed   [] Discussed with Radiologist.    LABS:   Labs Reviewed   CBC WITH AUTO DIFFERENTIAL - Abnormal; Notable for the following components:       Result Value    RDW-SD 49.7 (*)     All other components within normal limits   SALICYLATE LEVEL - Abnormal; Notable for the following components:    Salicylate, Serum < 0.3 (*)     All other components within normal limits   GLOMERULAR FILTRATION RATE, ESTIMATED - Abnormal; Notable for the following components:    Est, Glom Filt Rate 74 (*)     All other components within normal limits   OSMOLALITY - Abnormal; Notable for the following components:    Osmolality Calc 271.7 (*)     All other components within normal limits   APTT   PROTIME-INR   BASIC METABOLIC PANEL   HEPATIC FUNCTION PANEL   MAGNESIUM   CALCIUM, IONIZED   TSH WITH REFLEX   ACETAMINOPHEN LEVEL   ETHANOL   URINE DRUG SCREEN   COVID-19   ANION GAP   POCT GLUCOSE       EMERGENCY DEPARTMENT COURSE:   Vitals:    Vitals:    11/09/20 1552 11/09/20 1728   BP:  116/81   Pulse: 70 68   Resp: 16 16   Temp: 98.8 °F (37.1 °C)    TempSrc: Oral    SpO2: 96% 98%   Weight: 230 lb (104.3 kg)    Height: 5' 6\" (1.676 m)      Nursing notes reviewed    Remains sinus rhythm on monitor     CT scan of the brain was normal    Urine drug screen negative    Covid-19 negative    Normal CBC    Normal renal function, electrolytes, blood sugar    Normal magnesium and ionized calcium    Normal TSH    Salicylates and acetaminophen negative    EtOH negative    Covid-19 negative    Orthostatic Blood Pressures:  BP  111/71, P 58 lying. BP  94/67, P 65 standing. She was given 1 L of saline IV    Orthostatic Blood Pressures #2:  BP  128/82, P 60 lying. BP  125/69, P 59 standing. BP  111/76, P58 with continued standing. She felt slightly lightheaded but not as bad as when she first arrived    Visual acuity:  20/50 right  20/50 left  20/40 both    At this point the etiology of her blurry vision is unclear  She does have some visual acuity that is not corrected to 20/20  Recommend outpatient follow-up with her eye specialist to possibly change her prescription    Meanwhile review of her medicines indicates that she did start an over-the-counter supplement about a month ago  It seems unlikely that this would interact with her medicines but it was the only change in her medicines. We will ask her to stop this over-the-counter supplement and see if that helps any    Encouraged to lay down if dizzy because of slight orthostasis component noted today    Follow-up with your primary care    I did discuss the case with  on-call for neurology  My recommendation was for outpatient follow-up with her primary neurologist and Dr. Manuel Ramirez thought that would be acceptable  Neither of us feel this is an ischemic event    Patient feels comfortable with discharge home        CRITICAL CARE:   none    CONSULTS:  Dr. Ashley Bell:  None    FINAL IMPRESSION      1. Dizziness    2.  Orthostatic hypotension          DISPOSITION/PLAN     Laydown, at once, if dizzy    Get her eye specialist to check her eyes possible need change of prescription        PATIENT REFERRED TO:  Veleta Dance, MD  [address] [place] [address],[phone]      Call for earlier appointment      DISCHARGE MEDICATIONS:  Discharge Medication List as of 11/9/2020  6:13 PM          (Please note that portions of this note were completed with a voice recognition program.  Efforts were made to edit the dictations but occasionally words are mis-transcribed.)    MD Marcie Gaines MD  11/09/20 5957

## 2020-11-11 ENCOUNTER — HOSPITAL ENCOUNTER (OUTPATIENT)
Dept: GENERAL RADIOLOGY | Age: 60
Discharge: HOME | End: 2020-11-11
Payer: COMMERCIAL

## 2020-11-11 ENCOUNTER — HOSPITAL ENCOUNTER (OUTPATIENT)
Age: 60
Discharge: HOME | End: 2020-11-11
Payer: COMMERCIAL

## 2020-11-11 LAB
BACTERIA: ABNORMAL
BILIRUBIN URINE: NEGATIVE
BLOOD, URINE: NEGATIVE
CASTS: ABNORMAL /LPF
CASTS: ABNORMAL /LPF
CHARACTER, URINE: CLEAR
COLOR: YELLOW
CRYSTALS: ABNORMAL
D-DIMER QUANTITATIVE: 218 NG/ML FEU (ref 0–500)
EPITHELIAL CELLS, UA: ABNORMAL /HPF
GLUCOSE, URINE: NEGATIVE MG/DL
KETONES, URINE: NEGATIVE
LEUKOCYTE EST, POC: ABNORMAL
MISCELLANEOUS LAB TEST RESULT: ABNORMAL
NITRITE, URINE: NEGATIVE
PH UA: 5 (ref 5–9)
PROTEIN UA: NEGATIVE MG/DL
RBC URINE: ABNORMAL /HPF
RENAL EPITHELIAL, UA: ABNORMAL
SEDIMENTATION RATE, ERYTHROCYTE: 8 MM/HR (ref 0–20)
SPECIFIC GRAVITY UA: 1.01 (ref 1–1.03)
UROBILINOGEN, URINE: 0.2 EU/DL (ref 0–1)
WBC UA: ABNORMAL /HPF
YEAST: ABNORMAL

## 2020-11-11 PROCEDURE — 81001 URINALYSIS AUTO W/SCOPE: CPT

## 2020-11-11 PROCEDURE — 87040 BLOOD CULTURE FOR BACTERIA: CPT

## 2020-11-11 PROCEDURE — 85651 RBC SED RATE NONAUTOMATED: CPT

## 2020-11-11 PROCEDURE — 71046 X-RAY EXAM CHEST 2 VIEWS: CPT

## 2020-11-11 PROCEDURE — 85379 FIBRIN DEGRADATION QUANT: CPT

## 2020-11-11 PROCEDURE — 36415 COLL VENOUS BLD VENIPUNCTURE: CPT

## 2020-11-17 LAB
BLOOD CULTURE, ROUTINE: NORMAL
BLOOD CULTURE, ROUTINE: NORMAL

## 2020-12-02 ENCOUNTER — NURSE ONLY (OUTPATIENT)
Dept: LAB | Age: 60
End: 2020-12-02

## 2020-12-11 ENCOUNTER — VIRTUAL VISIT (OUTPATIENT)
Dept: NEUROLOGY | Age: 60
End: 2020-12-11
Payer: COMMERCIAL

## 2020-12-11 PROCEDURE — G8428 CUR MEDS NOT DOCUMENT: HCPCS | Performed by: PSYCHIATRY & NEUROLOGY

## 2020-12-11 PROCEDURE — 99214 OFFICE O/P EST MOD 30 MIN: CPT | Performed by: PSYCHIATRY & NEUROLOGY

## 2020-12-11 PROCEDURE — 3017F COLORECTAL CA SCREEN DOC REV: CPT | Performed by: PSYCHIATRY & NEUROLOGY

## 2020-12-11 NOTE — PATIENT INSTRUCTIONS
1. Tilt table test.   2. Event monitor. 3. EEG  4. Lamictal level. 5. Continue Lamictal 250 mg twice a day  6. No driving, swimming, operating heavy machinery or compromising heights until cleared  7. Report any new seizure or spells  8. Follow up in 2-3 months or sooner if needed. 9. Call if any questions or concerns.

## 2020-12-11 NOTE — PROGRESS NOTES
Nausea or Vomiting  Historical Provider, MD   meclizine (ANTIVERT) 12.5 MG tablet Take 12.5 mg by mouth 3 times daily as needed  Historical Provider, MD   FLUoxetine HCl (PROZAC PO) Take 40 mg by mouth daily   Historical Provider, MD   lisinopril (PRINIVIL;ZESTRIL) 5 MG tablet Take 5 mg by mouth daily  Historical Provider, MD   fluticasone (FLONASE) 50 MCG/ACT nasal spray 1 spray by Nasal route daily for 7 days  ÁNGELA Juarez - CNP   therapeutic multivitamin-minerals [facility]) tablet Take 1 tablet by mouth daily. Historical Provider, MD   Calcium Carbonate (CALTRATE 600 PO) Take by mouth daily   Historical Provider, MD   clonazePAM (KLONOPIN) 1 MG tablet Take 1 mg by mouth nightly as needed.     Historical Provider, MD       Social History     Tobacco Use    Smoking status: Never Smoker    Smokeless tobacco: Never Used   Substance Use Topics    Alcohol use: Yes     Comment: rare    Drug use: No        Past Medical History:   Diagnosis Date    Anxiety     Depression     Seizures ([address]    ,   Past Surgical History:   Procedure Laterality Date    CYST REMOVAL      right ribcage    KNEE SURGERY      SPINE SURGERY      SPLENECTOMY      TUMOR REMOVAL  1974    stomach    WRIST GANGLION EXCISION      right wrist   ,   Social History     Tobacco Use    Smoking status: Never Smoker    Smokeless tobacco: Never Used   Substance Use Topics    Alcohol use: Yes     Comment: rare    Drug use: No       PHYSICAL EXAMINATION:  [ INSTRUCTIONS:  \"[x]\" Indicates a positive item  \"[]\" Indicates a negative item  -- DELETE ALL ITEMS NOT EXAMINED]  Vital Signs: (As obtained by patient/caregiver or practitioner observation)    Blood pressure-  Heart rate-    Respiratory rate-    Temperature-  Pulse oximetry-     Constitutional: [x] Appears well-developed and well-nourished [x] No apparent distress      [] Abnormal-   Mental status  [x] Alert and awake  [x] Oriented to person/place/time [x]Able to follow commands 2. Event monitor. 3. EEG  4. Lamictal level. 5. Continue Lamictal 250 mg twice a day  6. Consider Vimpat as a next step  7. No driving, swimming, operating heavy machinery or compromising heights until cleared  8. Report any new seizure or spells  9. Follow up in 2-3 months or sooner if needed. 10. Call if any questions or concerns. Return in about 3 months (around 3/11/2021). Kathleen Fatima is a 64 y.o. female being evaluated by a Virtual Visit (video visit) encounter to address concerns as mentioned above. A caregiver was present when appropriate. Due to this being a TeleHealth encounter (During [place] [address] public [facility] emergency), evaluation of the following organ systems was limited: Vitals/Constitutional/EENT/Resp/CV/GI//MS/Neuro/Skin/Heme-Lymph-Imm. Pursuant to the emergency declaration under the [address] [place] [address] and the [employer] Act, this Virtual Visit was conducted with patient's (and/or legal guardian's) consent, to reduce the patient's risk of exposure to COVID-19 and provide necessary medical care. The patient (and/or legal guardian) has also been advised to contact this office for worsening conditions or problems, and seek emergency medical treatment and/or call 911 if deemed necessary. Patient identification was verified at the start of the visit: Yes    Total time spent on this encounter: 15 min    Services were provided through a video synchronous discussion virtually to substitute for in-person clinic visit. Patient and provider were located at their individual homes. --Kassie Marmolejo MD on 12/11/2020 at 9:47 AM    An electronic signature was used to authenticate this note.

## 2020-12-16 ENCOUNTER — HOSPITAL ENCOUNTER (OUTPATIENT)
Dept: NEUROLOGY | Age: 60
Discharge: HOME | End: 2020-12-16
Payer: COMMERCIAL

## 2020-12-16 ENCOUNTER — HOSPITAL ENCOUNTER (OUTPATIENT)
Age: 60
Discharge: HOME | End: 2020-12-16
Payer: COMMERCIAL

## 2020-12-16 ENCOUNTER — HOSPITAL ENCOUNTER (OUTPATIENT)
Dept: NON INVASIVE DIAGNOSTICS | Age: 60
Discharge: HOME | End: 2020-12-16
Payer: COMMERCIAL

## 2020-12-16 PROCEDURE — 95819 EEG AWAKE AND ASLEEP: CPT

## 2020-12-16 PROCEDURE — 80175 DRUG SCREEN QUAN LAMOTRIGINE: CPT

## 2020-12-16 PROCEDURE — 95816 EEG AWAKE AND DROWSY: CPT | Performed by: PSYCHIATRY & NEUROLOGY

## 2020-12-16 PROCEDURE — 36415 COLL VENOUS BLD VENIPUNCTURE: CPT

## 2020-12-16 PROCEDURE — 93270 REMOTE 30 DAY ECG REV/REPORT: CPT

## 2020-12-16 NOTE — PROGRESS NOTES
Provider, MD   lisinopril (PRINIVIL;ZESTRIL) 5 MG tablet Take 5 mg by mouth daily    Historical Provider, MD   fluticasone (FLONASE) 50 MCG/ACT nasal spray 1 spray by Nasal route daily for 7 days 10/9/19 9/9/20  ÁNGELA Tam - CNP   therapeutic multivitamin-minerals [facility]) tablet Take 1 tablet by mouth daily. Historical Provider, MD   Calcium Carbonate (CALTRATE 600 PO) Take by mouth daily     Historical Provider, MD   clonazePAM (KLONOPIN) 1 MG tablet Take 1 mg by mouth nightly as needed.       Historical Provider, MD       Technician: Rebecca Alvarado 12/16/2020

## 2020-12-16 NOTE — PROCEDURES
30 day cardiac event monitor applied. isntructions given. Benito has no further qeustions.   Paige Ng, CCT

## 2020-12-17 LAB — LAMOTRIGINE LEVEL: 11.2 UG/ML (ref 2.5–15)

## 2020-12-21 ENCOUNTER — TELEPHONE (OUTPATIENT)
Dept: NEUROLOGY | Age: 60
End: 2020-12-21

## 2020-12-21 NOTE — TELEPHONE ENCOUNTER
Left voice message for patient to return call to office. Sent [employer] message to notify of above.

## 2020-12-21 NOTE — TELEPHONE ENCOUNTER
----- Message from Jose L Kline MD sent at 12/20/2020 10:06 AM EST -----  Please let patient Know EEG result was normal.  Jose L Kline MD

## 2021-01-12 ENCOUNTER — HOSPITAL ENCOUNTER (OUTPATIENT)
Dept: NON INVASIVE DIAGNOSTICS | Age: 61
Discharge: HOME | End: 2021-01-12
Payer: COMMERCIAL

## 2021-01-12 VITALS — WEIGHT: 207 LBS | HEIGHT: 66 IN | BODY MASS INDEX: 33.27 KG/M2

## 2021-01-12 DIAGNOSIS — G40.909 SEIZURE DISORDER (HCC): ICD-10-CM

## 2021-01-12 PROCEDURE — 93660 TILT TABLE EVALUATION: CPT

## 2021-01-13 NOTE — PROCEDURES
[address] [place] [address]                                TILT TABLE TEST    PATIENT NAME: Oly Kennedy                  :        1960  MED REC NO:   [id number]                           ROOM:  ACCOUNT NO:   [de-identified]                           ADMIT DATE: 2021  PROVIDER:     Jessica Simmons. SHELIA Javier Sayer:  2021    TILT TABLE TEST    INDICATION:  Seizure disorder and history of dizziness. Baseline blood pressure 123/71, pulse rate 64. Baseline EKG, sinus  rhythm. PROCEDURE IN DETAIL:  Under continuous EKG monitoring and intermittent  blood pressure monitoring, the patient was allowed to assume standing  position at 70-degree inclination. Three minutes into tilting, blood  pressure 112/65, pulse rate 59; 10 minutes into tilting, blood pressure  109/72, pulse rate 63; 20 minutes into tilting, blood pressure 118/63,  pulse rate 61; 31 minutes into tilting, blood pressure 122/70, pulse  rate 65. After 30 minutes of tilting, the patient was allowed to assume  supine position. Three minutes in supine position, blood pressure  121/63, pulse rate 65. SYMPTOMATOLOGY:  No symptoms throughout tilting. HEMODYNAMICS:  There are no major symptoms on tilting, however, after 18  and 17 minutes, the patient felt little lightheadedness. At that time,  blood pressure 106/71, pulse rate 63 and then at 18 minutes, blood  pressure 125/73, pulse rate 64. Basically, otherwise, then the symptoms  resolved with no further dizziness. Hemodynamically stable throughout tilting. No significant changes in  blood pressure and heart rate. EKG MONITORING:  No arrhythmia noted. CONCLUSIONS:  1. This is a benign tilt table study. 2.  Tilt table test negative for vasovagal response. 3.  Tilt table test negative for orthostatic hypotension.   4.  Tilt table test negative for POT syndrome (postural orthostatic  tachycardia syndrome). Lucina Olea M.D.    D: 01/12/2021 20:19:06       T: 01/12/2021 22:24:38     [email or web address]  Job#: [id number]     Doc#: [id number]  CC:

## 2021-01-20 ENCOUNTER — TELEPHONE (OUTPATIENT)
Dept: NEUROLOGY | Age: 61
End: 2021-01-20

## 2021-01-20 NOTE — TELEPHONE ENCOUNTER
----- Message from Verenice Bell MD sent at 1/20/2021  1:20 PM EST -----  Please let patient Know event monitor result showed no concerning findings.

## 2021-01-20 NOTE — PROCEDURES
[address] [id number]                                 EVENT MONITOR    PATIENT NAME: Madi Colon                  :        1960  MED REC NO:   [id number]                           ROOM:  ACCOUNT NO:   [de-identified]                           ADMIT DATE: 2020  PROVIDER:     Tari Hilliard M.D. CLINICAL HISTORY AND INDICATION:  This is a patient with palpitation. EVENT MONITOR DESCRIPTION:  Event monitor was attached to the patient  between 2020 and 2021. EVENT MONITOR FINDINGS:  Baseline rhythm showed sinus rhythm. Pretty  much unremarkable event monitor for any significant arrhythmias. CONCLUSION:  Unremarkable event monitor with no significant arrhythmias.         Kami Hidalgo M.D.    D: 2021 7:21:51       T: 2021 10:21:40     [email or web address]  Job#: [id number]     Doc#: [id number]    CC:

## 2021-03-11 ENCOUNTER — TELEPHONE (OUTPATIENT)
Dept: NEUROLOGY | Age: 61
End: 2021-03-11

## 2021-03-11 DIAGNOSIS — G40.909 SEIZURE DISORDER (HCC): Primary | ICD-10-CM

## 2021-03-11 RX ORDER — LACOSAMIDE 50 MG/1
50 TABLET ORAL 2 TIMES DAILY
Qty: 60 TABLET | Refills: 5 | Status: SHIPPED | OUTPATIENT
Start: 2021-03-11 | End: 2021-06-09

## 2021-03-11 NOTE — TELEPHONE ENCOUNTER
Maintain same dose of Lamictal.   Start Vimpat 50 mg twice a day, new script sent to pharmacy. Continue taking both medications.    Vreenice Bell MD

## 2021-04-01 ENCOUNTER — TELEPHONE (OUTPATIENT)
Dept: NEUROLOGY | Age: 61
End: 2021-04-01

## 2021-04-01 DIAGNOSIS — R56.9 SEIZURE (HCC): ICD-10-CM

## 2021-04-01 DIAGNOSIS — G40.909 SEIZURE DISORDER (HCC): Primary | ICD-10-CM

## 2021-04-01 NOTE — TELEPHONE ENCOUNTER
Patient called stating she had episode yesterday. She was at the grocery store. She was feeling anxious. She passed out. She woke up on the floor. She was confused afterwards. She called a  to drive her home. She denies hitting her head. She feels tired today. She is taking Lamictal 250 mg 2 times a day, and Vimpat 50 mg 2 times a day. Vimpat was just started Instructed no driving, swimming, operating heavy machinery, or compromising heights until event free 6 months. Please advise. Thank you.

## 2021-04-01 NOTE — TELEPHONE ENCOUNTER
Spoke with Dr Rylee Hurt who stated 30 day event monitor and increase Vimpat to 100 mg 2 times a day, use own stock. Patient notified and verbalized understanding.

## 2021-04-13 ENCOUNTER — TELEPHONE (OUTPATIENT)
Dept: NEUROLOGY | Age: 61
End: 2021-04-13

## 2021-04-13 DIAGNOSIS — G40.909 SEIZURE DISORDER (HCC): Primary | ICD-10-CM

## 2021-04-13 RX ORDER — LAMOTRIGINE 100 MG/1
250 TABLET ORAL 2 TIMES DAILY
Qty: 150 TABLET | Refills: 5 | Status: SHIPPED | OUTPATIENT
Start: 2021-04-13 | End: 2021-12-10

## 2021-04-13 NOTE — TELEPHONE ENCOUNTER
Lower the Vimpat to 50 mg twice a day. Continue with current dosage of Lamictal.   Lamictal level,   Referral to epilepsy clinic   No driving please.    Laney Miller MD

## 2021-04-13 NOTE — TELEPHONE ENCOUNTER
Patient called stating since increasing Vimpat to 100 mg 2 times a day she is having dizziness, shakiness, memory problems, nausea, and disorientation. She is also taking Lamictal 250 mg 2 times a day. Please advise. Thank you.

## 2021-04-14 ENCOUNTER — HOSPITAL ENCOUNTER (OUTPATIENT)
Dept: NON INVASIVE DIAGNOSTICS | Age: 61
Discharge: HOME | End: 2021-04-14
Payer: COMMERCIAL

## 2021-04-14 DIAGNOSIS — G40.909 SEIZURE DISORDER (HCC): ICD-10-CM

## 2021-04-14 DIAGNOSIS — R56.9 SEIZURE (HCC): ICD-10-CM

## 2021-04-14 PROCEDURE — 93270 REMOTE 30 DAY ECG REV/REPORT: CPT

## 2021-04-14 NOTE — PROCEDURES
The skin was prepped and a 30 day cardiac event monitor was applied. The patient was instructed on the documentation of symptoms and the purpose of the monitor as well as the things to avoid while wearing the monitor. The patient was instructed to remove and return the monitor on 04/15/2021.   The serial number of the monitor that was applied is [id number]

## 2021-04-28 ENCOUNTER — TELEPHONE (OUTPATIENT)
Dept: NEUROLOGY | Age: 61
End: 2021-04-28

## 2021-04-28 NOTE — TELEPHONE ENCOUNTER
I suggest she reaches out to the family Dr on how to proceed with blood pressure and sugar.  Thanks  Khloe Gómez MD

## 2021-04-28 NOTE — TELEPHONE ENCOUNTER
Received abnormal event monitor report that showed sinus rhythm with artifact/lead loss. Spoke with patient who stated she was having problems with dizziness from yesterday morning until yesterday evening. Patient stated her BP and blood glucose had dropped yesterday. Her BP was 100/70, her glucose was 69. She does not follow with cardiology. Please advise. Thank you.

## 2021-04-30 LAB — LAMOTRIGINE: 2.6 MCG/ML (ref 2.5–15)

## 2021-05-12 ENCOUNTER — OFFICE VISIT (OUTPATIENT)
Dept: NEUROLOGY | Age: 61
End: 2021-05-12
Payer: COMMERCIAL

## 2021-05-12 VITALS
BODY MASS INDEX: 33.11 KG/M2 | WEIGHT: 206 LBS | HEART RATE: 68 BPM | HEIGHT: 66 IN | DIASTOLIC BLOOD PRESSURE: 82 MMHG | SYSTOLIC BLOOD PRESSURE: 118 MMHG

## 2021-05-12 DIAGNOSIS — R55 SYNCOPE AND COLLAPSE: ICD-10-CM

## 2021-05-12 DIAGNOSIS — G40.909 SEIZURE DISORDER (HCC): Primary | ICD-10-CM

## 2021-05-12 PROCEDURE — 99213 OFFICE O/P EST LOW 20 MIN: CPT | Performed by: NURSE PRACTITIONER

## 2021-05-12 PROCEDURE — 3017F COLORECTAL CA SCREEN DOC REV: CPT | Performed by: NURSE PRACTITIONER

## 2021-05-12 PROCEDURE — G8427 DOCREV CUR MEDS BY ELIG CLIN: HCPCS | Performed by: NURSE PRACTITIONER

## 2021-05-12 PROCEDURE — 1036F TOBACCO NON-USER: CPT | Performed by: NURSE PRACTITIONER

## 2021-05-12 PROCEDURE — G8417 CALC BMI ABV UP PARAM F/U: HCPCS | Performed by: NURSE PRACTITIONER

## 2021-05-12 RX ORDER — LORATADINE 10 MG/1
TABLET ORAL
COMMUNITY
Start: 2021-03-15

## 2021-05-12 NOTE — PATIENT INSTRUCTIONS
1. Continue with current doses of Lamictal and Vimpat  2. Proceed with evaluation with epilepsy specialist   3. No driving, swimming, operating heavy machinery or compromising heights until event free for 6 months. Report any new events. Call if any questions  4. Follow up after evaluation with epilepsy specialist    5. Call if any questions or concerns.

## 2021-05-12 NOTE — PROGRESS NOTES
NEUROLOGY OUT PATIENT FOLLOW UP NOTE:  5/12/20218:12 AM    Jerson Shields is here for follow up for seizure, syncope and collapse. She reports an increase in spells where she can stare off and her hands twitch. She has also had passing out spells. She just completed a 30 day holter monitor yesterday and is awaiting results. She is currently on lamictal  250 mg two times a day and vimpat 50 mg two times a day, and is tolerating the medication well. She has also called for dizziness and she has taken her blood pressure and it has been low. Her PCP has adjusted some of her blood pressure medications and she feels this has helped. She has had a significant increase in her anxiety and depression. She comes in today by herself to discuss the plan of care going forward. ROS:  Respiratory : no cough, no shortness of breath  Cardiac: no chest pain. No palpitations. Renal : no flank pain, no hematuria    Skin: no rash      Allergies   Allergen Reactions    Aspirin-Acetaminophen-Caffeine Anaphylaxis     excedrin    Caffeine Nausea And Vomiting    Percocet [Oxycodone-Acetaminophen]        Current Outpatient Medications:     loratadine (CLARITIN) 10 MG tablet, Loratadine (Claritin) 10 mg Tablet Active 10 MG PO Daily March 15th, 2021 9:03am, Disp: , Rfl:     lamoTRIgine (LAMICTAL) 100 MG tablet, Take 2.5 tablets by mouth 2 times daily, Disp: 150 tablet, Rfl: 5    lacosamide (VIMPAT) 50 MG TABS tablet, Take 1 tablet by mouth 2 times daily for 90 days. , Disp: 60 tablet, Rfl: 5    atorvastatin (LIPITOR) 20 MG tablet, Take 20 mg by mouth daily , Disp: , Rfl:     traZODone (DESYREL) 50 MG tablet, Take 100 mg by mouth nightly, Disp: , Rfl:     Ascorbic Acid (VITAMIN C) 1000 MG tablet, Take 1,000 mg by mouth 2 times daily, Disp: , Rfl:     Cholecalciferol (VITAMIN D3) 50 MCG (2000 UT) CAPS, Take by mouth daily, Disp: , Rfl:     GLUCOSAMINE-CHONDROITIN PO, Take 1,000 mg by mouth daily, Disp: , Rfl:     senna (SENOKOT) MD on 03/15/21 0639    Technologist:   (R) Jasmyne Dominguez    Transcribed by:  Kristy Bailey on 03/15/21 0751        Report Signed by:  MAK Mckeon MD on 03/15/21 8853          Assessment:     Diagnosis Orders   1. Seizure disorder [facility])  External Referral To Neurology   2. Syncope and collapse          She reports an increase in spells where she can stare off and her hands twitch. She has also had passing out spells. She just completed a 30 day holter monitor yesterday and is awaiting results. She is currently on lamictal  250 mg two times a day and vimpat 50 mg two times a day, and is tolerating the medication well. She reports she has not misses any doses of the medication, however her Lamictal level done 4/27/21=2.6. She has also called for dizziness and she has taken her blood pressure and it has been low. Her PCP has adjusted some of her blood pressure medications and she feels this has helped. She has had a significant increase in her anxiety and depression. I suspect she may have both epileptic and nonepileptic events. She is pending formal evaluation with epilepsy specialist. Her exam is nonfocal. After detailed discussion with patient we agreed on the following plan. Plan:  1. Continue with current doses of Lamictal and Vimpat  2. Proceed with evaluation with epilepsy specialist   3. No driving, swimming, operating heavy machinery or compromising heights until event free for 6 months. Report any new events. Call if any questions  4. Follow up after evaluation with epilepsy specialist    5. Call if any questions or concerns. Time spent evaluating patient, reviewing records, counseling with more than 50% of the time spent for counseling was 25 min.     Gauri Gao CNP

## 2021-08-24 ENCOUNTER — TELEPHONE (OUTPATIENT)
Dept: CARDIOLOGY CLINIC | Age: 61
End: 2021-08-24

## 2021-08-26 ENCOUNTER — OFFICE VISIT (OUTPATIENT)
Dept: CARDIOLOGY CLINIC | Age: 61
End: 2021-08-26
Payer: COMMERCIAL

## 2021-08-26 VITALS
HEIGHT: 66 IN | DIASTOLIC BLOOD PRESSURE: 66 MMHG | WEIGHT: 187 LBS | HEART RATE: 57 BPM | SYSTOLIC BLOOD PRESSURE: 102 MMHG | BODY MASS INDEX: 30.05 KG/M2

## 2021-08-26 DIAGNOSIS — R07.89 CHEST PAIN, ATYPICAL: Primary | ICD-10-CM

## 2021-08-26 DIAGNOSIS — Z82.49 FAMILY HISTORY OF PREMATURE CAD: ICD-10-CM

## 2021-08-26 DIAGNOSIS — E78.00 PURE HYPERCHOLESTEROLEMIA: ICD-10-CM

## 2021-08-26 PROCEDURE — G8417 CALC BMI ABV UP PARAM F/U: HCPCS | Performed by: INTERNAL MEDICINE

## 2021-08-26 PROCEDURE — 1036F TOBACCO NON-USER: CPT | Performed by: INTERNAL MEDICINE

## 2021-08-26 PROCEDURE — G8427 DOCREV CUR MEDS BY ELIG CLIN: HCPCS | Performed by: INTERNAL MEDICINE

## 2021-08-26 PROCEDURE — 99204 OFFICE O/P NEW MOD 45 MIN: CPT | Performed by: INTERNAL MEDICINE

## 2021-08-26 PROCEDURE — 3017F COLORECTAL CA SCREEN DOC REV: CPT | Performed by: INTERNAL MEDICINE

## 2021-08-26 RX ORDER — B-COMPLEX WITH VITAMIN C
TABLET ORAL
COMMUNITY

## 2021-08-26 RX ORDER — PANTOPRAZOLE SODIUM 20 MG/1
TABLET, DELAYED RELEASE ORAL
COMMUNITY
Start: 2021-08-21

## 2021-08-26 NOTE — PROGRESS NOTES
Chief Complaint   Patient presents with    New Patient     abnormal EKG   New patient referral for abnormal EKG referred by pcp. 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 Under the media tab.     Denied sob, palpitations or edema    Dizziness on standing quick for the last 6 month  3x/ weeks  bp tend to be low normal 96/62 and with HR 58    No hx of cp other than this    No known Hx of CAD    Lost 60 lb recently 20/30  Program  And after off her bp meds    Nonsmoker    FHX  Mother had mi at age 46 had CABG  Father had  MI at age late 52's        Past Surgical History:   Procedure Laterality Date    CYST REMOVAL      right ribcage    KNEE SURGERY      SPINE SURGERY      SPLENECTOMY      TUMOR REMOVAL  1974    stomach    WRIST GANGLION EXCISION      right wrist       Allergies   Allergen Reactions    Aspirin-Acetaminophen-Caffeine Anaphylaxis     excedrin    Caffeine Nausea And Vomiting    Percocet [Oxycodone-Acetaminophen]         Family History   Problem Relation Age of Onset    Cancer Mother     Depression Mother     Early Death Mother     Heart Disease Mother     High Blood Pressure Mother     High Cholesterol Mother     Diabetes Father     Early Death Father     Kidney Disease Father     Heart Disease Father     High Blood Pressure Father     High Cholesterol Father     Learning Disabilities Father     Substance Abuse Father     Cancer Maternal Aunt     Arthritis Maternal Grandmother     Stroke Paternal Grandfather         Social History     Socioeconomic History    Marital status:      Spouse name: Not on file    Number of children: Not on file    Years of education: Not on file    Highest education level: Not on file   Occupational History    Not on file   Tobacco Use    Smoking status: Never Smoker    Smokeless tobacco: Never Used   Vaping Use    Vaping Use: Never used   Substance and Sexual Activity    Alcohol use: Yes     Comment: rare    Drug use: No    Sexual activity: Yes     Partners: Male   Other Topics Concern    Not on file   Social History Narrative    Not on file     Social Determinants of Health     Financial Resource Strain:     Difficulty of Paying Living Expenses:    Food Insecurity:     Worried About Running Out of Food in the Last Year:     [address] in the Last Year:    Transportation Needs:     Lack of Transportation (Medical):      Lack of Transportation (Non-Medical):    Physical Activity:     Days of Exercise per Week:     Minutes of Exercise per Session:    Stress:     Feeling of Stress :    Social Connections:     Frequency of Communication with Friends and Family:     Frequency of Social Gatherings with Friends and Family:     Attends Spiritism Services:     Active Member of Clubs or Organizations:     Attends Club or Organization Meetings:     Marital Status:    Intimate Partner Violence:     Fear of Current or Ex-Partner:     Emotionally Abused:     Physically Abused:     Sexually Abused:        Current Outpatient Medications   Medication Sig Dispense Refill    pantoprazole (PROTONIX) 20 MG tablet TAKE 1 TABLET BY MOUTH ONCE DAILY      Zinc 100 MG TABS Take by mouth      NONFORMULARY Take 300 mg by mouth daily relora      Milk Thistle 1000 MG CAPS Take 1,000 mg by mouth daily      loratadine (CLARITIN) 10 MG tablet Loratadine (Claritin) 10 mg Tablet Active 10 MG PO Daily March 15th, 2021 9:03am      lamoTRIgine (LAMICTAL) 100 MG tablet Take 2.5 tablets by mouth 2 times daily 150 tablet 5    atorvastatin (LIPITOR) 20 MG tablet Take 20 mg by mouth daily       traZODone (DESYREL) 50 MG tablet Take 100 mg by mouth nightly      Ascorbic Acid (VITAMIN C) 1000 MG tablet Take 1,000 mg by mouth 2 times daily      Cholecalciferol (VITAMIN D3) 50 MCG (2000 UT) CAPS Take by mouth daily      GLUCOSAMINE-CHONDROITIN PO Take 1,000 mg by mouth daily as needed       senna (SENOKOT) 8.6 MG tablet Take 1 tablet by mouth 2 times daily      ondansetron (ZOFRAN) 8 MG tablet Take 8 mg by mouth every 8 hours as needed for Nausea or Vomiting      meclizine (ANTIVERT) 12.5 MG tablet Take 12.5 mg by mouth 3 times daily as needed      FLUoxetine HCl (PROZAC PO) Take 60 mg by mouth daily       therapeutic multivitamin-minerals (THERAGRAN-M) tablet Take 1 tablet by mouth daily.  Calcium Carbonate (CALTRATE 600 PO) Take by mouth daily       clonazePAM (KLONOPIN) 1 MG tablet Take 1 mg by mouth nightly as needed.  lacosamide (VIMPAT) 50 MG TABS tablet Take 1 tablet by mouth 2 times daily for 90 days. 60 tablet 5     No current facility-administered medications for this visit. Review of Systems -     General ROS: negative  Psychological ROS: negative  Hematological and Lymphatic ROS: No history of blood clots or bleeding disorder. Respiratory ROS: no cough,  or wheezing, the rest see HPI  Cardiovascular ROS: See HPI  Gastrointestinal ROS: negative  Genito-Urinary ROS: no dysuria, trouble voiding, or hematuria  Musculoskeletal ROS: negative  Neurological ROS: no TIA or stroke symptoms  Dermatological ROS: negative      Blood pressure 102/66, pulse 57, height 5' 6\" (1.676 m), weight 187 lb (84.8 kg).         Physical Examination:    General appearance - alert, well appearing, and in no distress  HEENT- Pink conjunctiva  , Non-icteri sclera,PERRLA  Mental status - alert, oriented to person, place, and time  Neck - supple, no significant adenopathy, no JVD, or carotid bruits  Chest - clear to auscultation, no wheezes, rales or rhonchi, symmetric air entry  Heart - normal rate, regular rhythm, normal S1, S2, no murmurs, rubs, clicks or gallops  Abdomen - soft, nontender, nondistended, no masses or organomegaly  DOMINIC- no CVA or syndrome). Min Harrell. Rhonda Bell M.D.  D: 01/12/2021 20:19:06        Event monitor  CONCLUSION:  1. Sinus rhythm. 2.  Unremarkable event monitor for any significant arrhythmias. Gurwinder Sharma M.D.  Diana Graff: 05/17/2021 7:50:39           ekg nov 2020  Normal sinus rhythm Normal ECG No previous ECGs available    08/20/21  NSR, no acute abn    Assessment   Diagnosis Orders   1. Chest pain, atypical  ECHO Complete 2D W Doppler W Color    NM MYOCARDIAL SPECT REST EXERCISE OR RX   2. Pure hypercholesterolemia  ECHO Complete 2D W Doppler W Color    NM MYOCARDIAL SPECT REST EXERCISE OR RX   3. Family history of premature CAD  ECHO Complete 2D W Doppler W Color    NM MYOCARDIAL SPECT REST EXERCISE OR RX       Plan     Continue the current treatment and with constant vigilance to changes in symptoms and also any potential side effects. Return for care or seek medical attention immediately if symptoms got worse and/or develop new symptoms.     Chest pain atypical  FHx of CAD  Need echo  Ex nuc    To see for further work up    D/w the pat the plan of care    Record from ed of [facility] reviewed    RTC IN 3 weeks      Kathleen Winter, [facility]

## 2021-09-03 ENCOUNTER — TELEPHONE (OUTPATIENT)
Dept: CARDIOLOGY | Age: 61
End: 2021-09-03

## 2021-09-03 DIAGNOSIS — Z82.49 FAMILY HISTORY OF PREMATURE CAD: ICD-10-CM

## 2021-09-03 DIAGNOSIS — R07.89 CHEST PAIN, ATYPICAL: Primary | ICD-10-CM

## 2021-09-03 DIAGNOSIS — E78.00 PURE HYPERCHOLESTEROLEMIA: ICD-10-CM

## 2021-09-03 NOTE — TELEPHONE ENCOUNTER
Patients stress and echo is cancelled and regular stress scheduled for same day. Pt notified and voiced understanding.

## 2021-09-09 ENCOUNTER — HOSPITAL ENCOUNTER (OUTPATIENT)
Dept: NON INVASIVE DIAGNOSTICS | Age: 61
Discharge: HOME | End: 2021-09-09
Payer: COMMERCIAL

## 2021-09-09 VITALS — WEIGHT: 185 LBS | BODY MASS INDEX: 29.03 KG/M2 | HEIGHT: 67 IN

## 2021-09-09 DIAGNOSIS — R07.89 CHEST PAIN, ATYPICAL: ICD-10-CM

## 2021-09-09 DIAGNOSIS — Z82.49 FAMILY HISTORY OF PREMATURE CAD: ICD-10-CM

## 2021-09-09 DIAGNOSIS — E78.00 PURE HYPERCHOLESTEROLEMIA: ICD-10-CM

## 2021-09-09 PROCEDURE — 93017 CV STRESS TEST TRACING ONLY: CPT | Performed by: INTERNAL MEDICINE

## 2021-09-16 ENCOUNTER — OFFICE VISIT (OUTPATIENT)
Dept: CARDIOLOGY CLINIC | Age: 61
End: 2021-09-16
Payer: COMMERCIAL

## 2021-09-16 VITALS
HEIGHT: 67 IN | DIASTOLIC BLOOD PRESSURE: 76 MMHG | BODY MASS INDEX: 29.16 KG/M2 | HEART RATE: 61 BPM | SYSTOLIC BLOOD PRESSURE: 123 MMHG | WEIGHT: 185.8 LBS

## 2021-09-16 DIAGNOSIS — Z87.898 HISTORY OF CHEST PAIN: Primary | ICD-10-CM

## 2021-09-16 DIAGNOSIS — R94.30 ABNORMAL CARDIAC FUNCTION TEST: ICD-10-CM

## 2021-09-16 DIAGNOSIS — Z82.49 FAMILY HISTORY OF PREMATURE CAD: ICD-10-CM

## 2021-09-16 DIAGNOSIS — E78.00 PURE HYPERCHOLESTEROLEMIA: ICD-10-CM

## 2021-09-16 DIAGNOSIS — K21.9 GASTROESOPHAGEAL REFLUX DISEASE, UNSPECIFIED WHETHER ESOPHAGITIS PRESENT: ICD-10-CM

## 2021-09-16 PROCEDURE — 3017F COLORECTAL CA SCREEN DOC REV: CPT | Performed by: INTERNAL MEDICINE

## 2021-09-16 PROCEDURE — 1036F TOBACCO NON-USER: CPT | Performed by: INTERNAL MEDICINE

## 2021-09-16 PROCEDURE — 99214 OFFICE O/P EST MOD 30 MIN: CPT | Performed by: INTERNAL MEDICINE

## 2021-09-16 PROCEDURE — G8417 CALC BMI ABV UP PARAM F/U: HCPCS | Performed by: INTERNAL MEDICINE

## 2021-09-16 PROCEDURE — G8427 DOCREV CUR MEDS BY ELIG CLIN: HCPCS | Performed by: INTERNAL MEDICINE

## 2021-09-16 RX ORDER — ASPIRIN 81 MG/1
81 TABLET ORAL DAILY
Qty: 90 TABLET | Refills: 1 | COMMUNITY
Start: 2021-09-16

## 2021-09-16 NOTE — PROGRESS NOTES
No chief complaint on file. Originally patient referral for abnormal EKG referred by pcp. 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 Under the media tab.     Previous Dizziness on standing quick for the last 6 month  3x/ weeks- resolved now  bp tend to be low normal 96/62 and with HR 58      No known Hx of CAD    Lost 60 lb recently 20/30  Program  And after off her bp meds    Nonsmoker    [address] [employer],[address]  Mother had mi at age 46 had CABG  Father had  MI at age late 52's        Past Surgical History:   Procedure Laterality Date    CAPSULE ENDOSCOPY      CYST REMOVAL      right ribcage    KNEE SURGERY      SPINE SURGERY      SPLENECTOMY      TUMOR REMOVAL  1974    stomach    WRIST GANGLION EXCISION      right wrist       Allergies   Allergen Reactions    Aspirin-Acetaminophen-Caffeine Nausea And Vomiting     excedrin    Caffeine Nausea And Vomiting    Percocet [Oxycodone-Acetaminophen]         Family History   Problem Relation Age of Onset    Cancer Mother     Depression Mother     Early Death Mother     Heart Disease Mother     High Blood Pressure Mother     High Cholesterol Mother     Diabetes Father     Early Death Father     Kidney Disease Father     Heart Disease Father     High Blood Pressure Father     High Cholesterol Father     Learning Disabilities Father     Substance Abuse Father     Cancer Maternal Aunt     Arthritis Maternal Grandmother     Stroke Paternal Grandfather         Social History     Socioeconomic History    Marital status:      Spouse name: Not on file    Number of children: Not on file    Years of education: Not on file    Highest education level: Not on file   Occupational History    Not on file   Tobacco Use    Smoking status: Never Smoker    Smokeless tobacco: Never Used   Vaping Use    Vaping Use: Never used   Substance and Sexual Activity    Alcohol use: Yes     Comment: rare    Drug use: No    Sexual activity: Yes     Partners: Male   Other Topics Concern    Not on file   Social History Narrative    Not on file     Social Determinants of Health     Financial Resource Strain:     Difficulty of Paying Living Expenses:    Food Insecurity:     Worried About Running Out of Food in the Last Year:     [address] in the Last Year:    Transportation Needs:     Lack of Transportation (Medical):      Lack of Transportation (Non-Medical):    Physical Activity:     Days of Exercise per Week:     Minutes of Exercise per Session:    Stress:     Feeling of Stress :    Social Connections:     Frequency of Communication with Friends and Family:     Frequency of Social Gatherings with Friends and Family:     Attends Scientologist Services:     Active Member of Clubs or Organizations:     Attends Club or Organization Meetings:     Marital Status:    Intimate Partner Violence:     Fear of Current or Ex-Partner:     Emotionally Abused:     Physically Abused:     Sexually Abused:        Current Outpatient Medications   Medication Sig Dispense Refill    aspirin EC 81 MG EC tablet Take 1 tablet by mouth daily 90 tablet 1    pantoprazole (PROTONIX) 20 MG tablet TAKE 1 TABLET BY MOUTH ONCE DAILY      Zinc 100 MG TABS Take by mouth      NONFORMULARY Take 300 mg by mouth daily relora      Milk Thistle 1000 MG CAPS Take 1,000 mg by mouth daily      loratadine (CLARITIN) 10 MG tablet Loratadine (Claritin) 10 mg Tablet Active 10 MG PO Daily March 15th, 2021 9:03am      lamoTRIgine (LAMICTAL) 100 MG tablet Take 2.5 tablets by mouth 2 times daily 150 tablet 5    atorvastatin (LIPITOR) 20 MG tablet Take 20 mg by mouth daily       traZODone (DESYREL) 50 MG tablet Take 100 mg by mouth nightly      Ascorbic Acid (VITAMIN C) 1000 MG tablet Take 1,000 mg by mouth 2 times daily      Cholecalciferol (VITAMIN D3) 50 MCG (2000 UT) CAPS Take by mouth daily      GLUCOSAMINE-CHONDROITIN PO Take 1,000 mg by mouth daily as needed       senna (SENOKOT) 8.6 MG tablet Take 1 tablet by mouth 2 times daily      ondansetron (ZOFRAN) 8 MG tablet Take 8 mg by mouth every 8 hours as needed for Nausea or Vomiting      meclizine (ANTIVERT) 12.5 MG tablet Take 12.5 mg by mouth 3 times daily as needed      FLUoxetine HCl (PROZAC PO) Take 60 mg by mouth daily       therapeutic multivitamin-minerals (THERAGRAN-M) tablet Take 1 tablet by mouth daily.  Calcium Carbonate (CALTRATE 600 PO) Take by mouth daily       clonazePAM (KLONOPIN) 1 MG tablet Take 1 mg by mouth nightly as needed.  lacosamide (VIMPAT) 50 MG TABS tablet Take 1 tablet by mouth 2 times daily for 90 days. 60 tablet 5     No current facility-administered medications for this visit. Review of Systems -     General ROS: negative  Psychological ROS: negative  Hematological and Lymphatic ROS: No history of blood clots or bleeding disorder. Respiratory ROS: no cough,  or wheezing, the rest see HPI  Cardiovascular ROS: See HPI  Gastrointestinal ROS: negative  Genito-Urinary ROS: no dysuria, trouble voiding, or hematuria  Musculoskeletal ROS: negative  Neurological ROS: no TIA or stroke symptoms  Dermatological ROS: negative      Blood pressure 123/76, pulse 61, height 5' 7\" (1.702 m), weight 185 lb 12.8 oz (84.3 kg).         Physical Examination:    General appearance - alert, well appearing, and in no distress  HEENT- Pink conjunctiva  , Non-icteri sclera,PERRLA  Mental status - alert, oriented to person, place, and time  Neck - supple, no significant adenopathy, no JVD, or carotid bruits  Chest - clear to auscultation, no wheezes, rales or rhonchi, symmetric air entry  Heart - normal rate, regular rhythm, normal S1, S2, no murmurs, rubs, clicks or gallops  Abdomen - soft, nontender, nondistended, no masses or organomegaly  DOMINIC- no CVA or flank tenderness, no suprapubic tenderness  Neurological - alert, oriented, normal speech, no focal findings or movement disorder noted  Musculoskeletal/limbs - no joint tenderness, deformity or swelling   - peripheral pulses normal, no pedal edema, no clubbing or cyanosis  Skin - normal coloration and turgor, no rashes, no suspicious skin lesions noted  Psych- appropriate mood and affect    Lab  No results for input(s): CKTOTAL, CKMB, CKMBINDEX, TROPONINI in the last 72 hours.   CBC:   Lab Results   Component Value Date    WBC 8.8 08/20/2021    RBC 4.68 08/20/2021    RBC 3.68 10/23/2011    HGB 14.6 08/20/2021    HCT 44.0 08/20/2021    MCV 94.1 08/20/2021    MCH 31.2 08/20/2021    MCHC 33.1 08/20/2021    RDW 14.4 08/20/2021     08/20/2021    MPV 11.0 11/09/2020     BMP:    Lab Results   Component Value Date     08/20/2021    K 4.4 08/20/2021     08/20/2021    CO2 28 08/20/2021    BUN 20 08/20/2021    LABALBU 4.4 08/20/2021    CREATININE 0.91 08/20/2021    CALCIUM 9.80 08/20/2021    LABGLOM 74 11/09/2020    GLUCOSE 85 08/20/2021     Hepatic Function Panel:    Lab Results   Component Value Date    ALKPHOS 61 08/20/2021    ALT 22 08/20/2021    AST 26 08/20/2021    PROT 7.0 08/20/2021    BILITOT 0.6 08/20/2021    BILIDIR 0.1 08/20/2021    LABALBU 4.4 08/20/2021     Magnesium:    Lab Results   Component Value Date    MG 2.3 11/09/2020     Warfarin PT/INR:  No components found for: PTPATWAR, PTINRWAR  HgBA1c:  No results found for: LABA1C  FLP:    Lab Results   Component Value Date    TRIG 76 08/25/2021    HDL 61 08/25/2021    LDLCALC 71 08/25/2021    LABVLDL 15 08/25/2021     TSH:    Lab Results   Component Value Date    TSH 1.650 11/09/2020     [facility]  CONCLUSIONS:  1. This is a benign tilt table study. 2.  Tilt table test negative for vasovagal response. 3.  Tilt table test negative for orthostatic hypotension. 4.  Tilt table test negative for POT syndrome (postural orthostatic  tachycardia syndrome). Carlos Merlin. Davin Anthony M.D.  D: 01/12/2021 20:19:06        Event monitor  CONCLUSION:  1. Sinus rhythm. 2.  Unremarkable event monitor for any significant arrhythmias. SHELIA Rich Hernandez: 05/17/2021 7:50:39           Stress Type: Exercise      Peak HR: 119 bpm           HR Response: Appropriate   Peak BP: 164/76 mmHg       BP Response: Normal Resting BP with appropriate   Predicted HR: 162 bpm      response to Stress   % of predicted HR: 73      HR BP Product: 99306   Test duration:9 min        Max Exercise: 10.1 METS      Reason for Termination:    Exercise Effort: Fair   Chest pain      Stress Interpretation      Patient was having limiting SOB. No stress induced arrhythmia. The did develop chest pain /tightness 8th min in to the exercise like 6/10   and resolved 4 min in to the recovery. Exercise EKG stress test is non diagnostic for ischemia because target HR   was not achieved and exercise/threadmill/ terminated by request of the   patient due to chest pain      Conclusions    Summary   The did develop chest pain /tightness 8th min in to the exercise like 6/10   and resolved 4 min in to the recovery. Exercise EKG stress test is non diagnostic for ischemia because minimum   target HR was not achieved and exercise/threadmill/ terminated by request   of the patient due to chest pain. Recommendation   consider Pharmacologic nuclear stress test if clinically indicated   Clinical correlation is recommended.       Signatures      ----------------------------------------------------------------   Electronically signed by Mak Cho MD (Performing   Physician) on 09/09/2021 at 14:00 ----------------------------------------------------------------        ekg nov 2020  Normal sinus rhythm Normal ECG No previous ECGs available    08/20/21  NSR, no acute abn    Assessment     Diagnosis Orders   1. History of chest pain  Stress test, lexiscan   2. Pure hypercholesterolemia  Stress test, lexiscan   3. Gastroesophageal reflux disease, unspecified whether esophagitis present  Stress test, lexiscan   4. Family history of premature CAD  Stress test, lexiscan       Plan     Continue the current treatment and with constant vigilance to changes in symptoms and also any potential side effects. Return for care or seek medical attention immediately if symptoms got worse and/or develop new symptoms.     Hx of Chest pain atypical  resolved  FHx of CAD  INS declined echo and Ex nuc  Regular stress test was nondiagnostic as target HR was not achieved due to cp  Post esophag  Dilatation - no more cp but during stress test  Recommend dave nuc stress test    Hx of GERD  Advised to take protonix daily  Start asa 81  Po qd  D/w the pat the plan of care    Record from ED of [facility] reviewed    Advised to f/u pcp    Advised to get to ER if any recurrence of cp    D/w the pat the plan of care    RTC IN 3 months    Steffen CamargoThayer County Hospital

## 2021-10-11 ENCOUNTER — TELEPHONE (OUTPATIENT)
Dept: CARDIOLOGY CLINIC | Age: 61
End: 2021-10-11

## 2021-10-11 PROBLEM — R94.30 ABNORMAL CARDIAC FUNCTION TEST: Status: ACTIVE | Noted: 2021-10-11

## 2021-10-11 NOTE — TELEPHONE ENCOUNTER
Received a message from  Musa Swan stating that patients Trent Gloria has been denied. verónica scheduled for 10/12/2021. Per June Wagner this has been cancelled and patient has been notified. I looked into this further. Looks like this denial is from the initial order for nuclear stress test. Insurance approved a standard non imaging stress test.     This stress test was completed and considered non diagnostic because patient couldn't reach target HR D/T CP. Patient then followed up in office with Dr. Jose M Li, and a new order for a Aby José Antonio scan was placed at this visit. A new prior auth should have been submitted for the verónica scan. I spoke with Yesenia Mcclain at [place] [address] prior auth dept. She informed me that a peer to peer could not be done for the original denied stress test, and confirmed that a new request would have to be submitted. I submitted the request on the [facility] portal. Today, 10/11/2021. I called patient and explained above to her, and that I will talk to our scheduling department and see if they want to reschedule the Aby José Antonio now, or wait until we have an approval for the stress test.     no questions or concerns voiced by patient at this time. PROCEDURE: VERÓNICA SCAN     DATE OF SERVICE: ???    SERVICE LOCATION: [facility]    CPT CODE: 68216    PHYSICIAN: DR. Duque Covert. DATE PRIOR AUTH SUBMITTED:     STATUS:APPROVED. CASE TRACKING NUMBER:  [id number]    AUTH NUMBER: [id number]    VALID: 10/11/2021-11/10/2021.

## 2021-10-19 NOTE — TELEPHONE ENCOUNTER
PROCEDURE: VERÓNICA SCAN     DATE OF SERVICE: ???    SERVICE LOCATION: [facility]    CPT CODE: 87157    PHYSICIAN: DR. Salvador Whittaker. DATE PRIOR AUTH SUBMITTED:     STATUS:APPROVED. CASE TRACKING NUMBER:  [id number]    AUTH NUMBER: [id number]    VALID: 10/11/2021-11/10/2021. I left patient a message informing her of approval and that scheduling will be contacting her soon to schedule VERÓNICA.

## 2021-10-27 ENCOUNTER — HOSPITAL ENCOUNTER (OUTPATIENT)
Dept: NON INVASIVE DIAGNOSTICS | Age: 61
Discharge: HOME | End: 2021-10-27
Payer: COMMERCIAL

## 2021-10-27 VITALS — BODY MASS INDEX: 28.04 KG/M2 | WEIGHT: 179 LBS

## 2021-10-27 DIAGNOSIS — R94.30 ABNORMAL CARDIAC FUNCTION TEST: ICD-10-CM

## 2021-10-27 DIAGNOSIS — K21.9 GASTROESOPHAGEAL REFLUX DISEASE, UNSPECIFIED WHETHER ESOPHAGITIS PRESENT: ICD-10-CM

## 2021-10-27 DIAGNOSIS — Z87.898 HISTORY OF CHEST PAIN: ICD-10-CM

## 2021-10-27 DIAGNOSIS — E78.00 PURE HYPERCHOLESTEROLEMIA: ICD-10-CM

## 2021-10-27 DIAGNOSIS — Z82.49 FAMILY HISTORY OF PREMATURE CAD: ICD-10-CM

## 2021-10-27 PROCEDURE — 3430000000 HC RX DIAGNOSTIC RADIOPHARMACEUTICAL: Performed by: INTERNAL MEDICINE

## 2021-10-27 PROCEDURE — A9500 TC99M SESTAMIBI: HCPCS | Performed by: INTERNAL MEDICINE

## 2021-10-27 PROCEDURE — 6360000002 HC RX W HCPCS

## 2021-10-27 PROCEDURE — 78452 HT MUSCLE IMAGE SPECT MULT: CPT

## 2021-10-27 PROCEDURE — 93017 CV STRESS TEST TRACING ONLY: CPT | Performed by: INTERNAL MEDICINE

## 2021-10-27 RX ADMIN — Medication 9.3 MILLICURIE: at 07:11

## 2021-10-27 RX ADMIN — Medication 30.8 MILLICURIE: at 08:21

## 2021-11-11 ENCOUNTER — HOSPITAL ENCOUNTER (OUTPATIENT)
Dept: WOMENS IMAGING | Age: 61
Discharge: HOME | End: 2021-11-11
Payer: COMMERCIAL

## 2021-11-11 DIAGNOSIS — Z12.31 VISIT FOR SCREENING MAMMOGRAM: ICD-10-CM

## 2021-11-11 PROCEDURE — 77063 BREAST TOMOSYNTHESIS BI: CPT

## 2021-11-17 ENCOUNTER — HOSPITAL ENCOUNTER (EMERGENCY)
Age: 61
Discharge: HOME | End: 2021-11-17
Attending: EMERGENCY MEDICINE
Payer: COMMERCIAL

## 2021-11-17 ENCOUNTER — APPOINTMENT (OUTPATIENT)
Dept: GENERAL RADIOLOGY | Age: 61
End: 2021-11-17
Payer: COMMERCIAL

## 2021-11-17 VITALS
DIASTOLIC BLOOD PRESSURE: 58 MMHG | RESPIRATION RATE: 18 BRPM | BODY MASS INDEX: 28.25 KG/M2 | HEART RATE: 67 BPM | HEIGHT: 67 IN | SYSTOLIC BLOOD PRESSURE: 121 MMHG | OXYGEN SATURATION: 97 % | TEMPERATURE: 98.8 F | WEIGHT: 180 LBS

## 2021-11-17 DIAGNOSIS — M25.561 ACUTE PAIN OF RIGHT KNEE: Primary | ICD-10-CM

## 2021-11-17 LAB
EKG ATRIAL RATE: 61 BPM
EKG P AXIS: 40 DEGREES
EKG P-R INTERVAL: 154 MS
EKG Q-T INTERVAL: 424 MS
EKG QRS DURATION: 74 MS
EKG QTC CALCULATION (BAZETT): 426 MS
EKG R AXIS: 27 DEGREES
EKG T AXIS: 46 DEGREES
EKG VENTRICULAR RATE: 61 BPM

## 2021-11-17 PROCEDURE — 93010 ELECTROCARDIOGRAM REPORT: CPT | Performed by: NUCLEAR MEDICINE

## 2021-11-17 PROCEDURE — 6370000000 HC RX 637 (ALT 250 FOR IP): Performed by: EMERGENCY MEDICINE

## 2021-11-17 PROCEDURE — 73564 X-RAY EXAM KNEE 4 OR MORE: CPT

## 2021-11-17 PROCEDURE — 99283 EMERGENCY DEPT VISIT LOW MDM: CPT

## 2021-11-17 PROCEDURE — 93005 ELECTROCARDIOGRAM TRACING: CPT | Performed by: EMERGENCY MEDICINE

## 2021-11-17 RX ORDER — HYDROCODONE BITARTRATE AND ACETAMINOPHEN 5; 325 MG/1; MG/1
1 TABLET ORAL 2 TIMES DAILY PRN
Qty: 10 TABLET | Refills: 0 | Status: SHIPPED | OUTPATIENT
Start: 2021-11-17 | End: 2021-11-22

## 2021-11-17 RX ORDER — HYDROCODONE BITARTRATE AND ACETAMINOPHEN 5; 325 MG/1; MG/1
1 TABLET ORAL ONCE
Status: COMPLETED | OUTPATIENT
Start: 2021-11-17 | End: 2021-11-17

## 2021-11-17 RX ADMIN — HYDROCODONE BITARTRATE AND ACETAMINOPHEN 1 TABLET: 5; 325 TABLET ORAL at 18:20

## 2021-11-17 ASSESSMENT — PAIN DESCRIPTION - ORIENTATION
ORIENTATION: RIGHT
ORIENTATION: RIGHT

## 2021-11-17 ASSESSMENT — PAIN DESCRIPTION - PAIN TYPE
TYPE: ACUTE PAIN
TYPE: ACUTE PAIN

## 2021-11-17 ASSESSMENT — PAIN SCALES - GENERAL
PAINLEVEL_OUTOF10: 9

## 2021-11-17 ASSESSMENT — PAIN DESCRIPTION - LOCATION
LOCATION: KNEE
LOCATION: KNEE

## 2021-11-17 NOTE — ED NOTES
Pt crying on cot due to being in pain. Pt updated on plan of care.  Pt removed telemetry monitor and Alexandr Lackey RN  11/17/21 [id number] Dov Cooney RN  11/17/21 [id number]

## 2021-11-17 NOTE — ED NOTES
Bed: 003A  Expected date:   Expected time:   Means of arrival:   Comments:     Susannah Bhatia RN  11/17/21 1144

## 2021-11-18 NOTE — ED NOTES
This nurse was waiting for d/c paperwork and pt was noted to be leaving the ED on her own.      Christi Joe RN  11/17/21 1945

## 2021-11-18 NOTE — ED PROVIDER NOTES
[facility] EMERGENCY DEPT      CHIEF COMPLAINT       Chief Complaint   Patient presents with    Tachycardia    Knee Pain       Nurses Notes reviewed and I agree except as noted in the HPI. HISTORY OF PRESENT ILLNESS    Sarah Longoria is a 64 y.o. female who presents with complaint of right knee pain, patient states that she was turning from a standing position with the right knee pivoted when she had a popping sound and had pain to the right knee immediately. She has no neuro deficits, has no foot drop. She had no trauma to the knee, the knee was not dislocated or displaced. Onset: Acute  Duration: Prior to arrival  Timing: Persistent  Location of Pain: Right knee  Intesity/severity: Moderate pain  Modifying Factors: Movement  Relieved by;  Previous Episodes; Tx Before arrival: None  REVIEW OF SYSTEMS      Review of Systems   Constitutional: Negative for fever, chills, diaphoresis and fatigue. HENT: Negative for congestion, drooling, facial swelling and sore throat. Eyes: Negative for photophobia, pain and discharge. Respiratory: Negative for cough, shortness of breath, wheezing and stridor. Cardiovascular: Negative for chest pain, palpitations and leg swelling. Gastrointestinal: Negative for abdominal pain, blood in stool and abdominal distention. Endocrine: Negative for cold intolerance, heat intolerance, polydipsia and polyuria. Genitourinary: Negative for dysuria, urgency, hematuria and difficulty urinating. Musculoskeletal: Positive for for gait problem; negative for neck pain and neck stiffness. Skin; No rash, No itching  Neurological: Negative for seizures, weakness and numbness. Psychiatric/Behavioral: Negative for hallucinations, confusion and agitation. PAST MEDICAL HISTORY    has a past medical history of Anxiety, Depression, and Seizures ([facility] [address] SURGICAL HISTORY      has a past surgical history that includes Spine surgery;  Wrist ganglion excision; cyst removal; tumor removal (1974); knee surgery; Splenectomy; and Capsule endoscopy. CURRENT MEDICATIONS       Discharge Medication List as of 11/17/2021  7:07 PM      CONTINUE these medications which have NOT CHANGED    Details   aspirin EC 81 MG EC tablet Take 1 tablet by mouth daily, Disp-90 tablet, R-1OTC      pantoprazole (PROTONIX) 20 MG tablet TAKE 1 TABLET BY MOUTH ONCE DAILYHistorical Med      Zinc 100 MG TABS Take by mouthHistorical Med      NONFORMULARY Take 300 mg by mouth daily reloraHistorical Med      Milk Thistle 1000 MG CAPS Take 1,000 mg by mouth dailyHistorical Med      loratadine (CLARITIN) 10 MG tablet Loratadine (Claritin) 10 mg Tablet Active 10 MG PO Daily March 15th, 2021 9:03amHistorical Med      lamoTRIgine (LAMICTAL) 100 MG tablet Take 2.5 tablets by mouth 2 times daily, Disp-150 tablet, R-5Normal      lacosamide (VIMPAT) 50 MG TABS tablet Take 1 tablet by mouth 2 times daily for 90 days. , Disp-60 tablet, R-5Normal      atorvastatin (LIPITOR) 20 MG tablet Take 20 mg by mouth daily Historical Med      traZODone (DESYREL) 50 MG tablet Take 100 mg by mouth nightlyHistorical Med      Ascorbic Acid (VITAMIN C) 1000 MG tablet Take 1,000 mg by mouth 2 times dailyHistorical Med      Cholecalciferol (VITAMIN D3) 50 MCG (2000 UT) CAPS Take by mouth dailyHistorical Med      GLUCOSAMINE-CHONDROITIN PO Take 1,000 mg by mouth daily as needed Historical Med      senna (SENOKOT) 8.6 MG tablet Take 1 tablet by mouth 2 times dailyHistorical Med      ondansetron (ZOFRAN) 8 MG tablet Take 8 mg by mouth every 8 hours as needed for Nausea or VomitingHistorical Med      meclizine (ANTIVERT) 12.5 MG tablet Take 12.5 mg by mouth 3 times daily as neededHistorical Med      FLUoxetine HCl (PROZAC PO) Take 60 mg by mouth daily Historical Med      therapeutic multivitamin-minerals (THERAGRAN-M) tablet Take 1 tablet by mouth daily.         Calcium Carbonate (CALTRATE 600 PO) Take by mouth daily Historical Med      clonazePAM respiratory distress. no wheezes. has no rales. exhibits no tenderness. Abdominal: Soft. Bowel sounds are normal.  exhibits no distension. There is no tenderness. There is no rebound and no guarding. Extremities: No edema, no tenderness, no cyanosis, no clubbing. Musculoskeletal: Good range of motion in major joints is observed. No major deformities noted. Neurological: Alert and oriented ×3, normal motor function, normal sensory function, no focal deficits. GCS 15  Skin: Skin is warm, dry and intact. No rash noted. No erythema. DIFFERENTIAL DIAGNOSIS:       DIAGNOSTIC RESULTS     EKG: All EKG's are interpreted by the Emergency Department Physician who either signs or Co-signs this chart in the absence of a cardiologist.    RADIOLOGY: non-plain film images(s) such as CT, Ultrasound and MRI are read by the radiologist.  Plain radiographic images are visualized and preliminarily interpreted by the emergency physician unless otherwise stated below. LABS:   Labs Reviewed - No data to display    EMERGENCY DEPARTMENT COURSE:   Vitals:    Vitals:    11/17/21 1627 11/17/21 1655 11/17/21 1821   BP: 99/74 117/60 (!) 121/58   Pulse: 155 76 67   Resp: 20 16 18   Temp:  98.8 °F (37.1 °C)    TempSrc:  Oral    SpO2: 99% 97% 97%   Weight: 180 lb (81.6 kg)     Height: 5' 7\" (1.702 m)       Presenting with complaint of right knee pain, states that she pivoted with her right knee while turning right when she heard a popping sound, no neuro deficits, pain to right knee, no joint effusion noted on exam.  We will treat patient with a knee brace, refer to orthopedics consider knee MRI. CRITICAL CARE:       CONSULTS:  None    PROCEDURES:  None    FINAL IMPRESSION      1.  Acute pain of right knee          DISPOSITION/PLAN   Decision To Discharge    PATIENT REFERRED TO:  [address] [place]  [address] [facility] [address]  Schedule an appointment as soon as possible for a visit in 1 day  RE-CHECK AND FURTHER TESTING AS NEEDED      DISCHARGE MEDICATIONS:  Discharge Medication List as of 11/17/2021  7:07 PM      START taking these medications    Details   HYDROcodone-acetaminophen (NORCO) 5-325 MG per tablet Take 1 tablet by mouth 2 times daily as needed for Pain for up to 5 days. , Disp-10 tablet, R-0Print             (Please note that portions of this note were completed with a voice recognition program.  Efforts were made to edit the dictations but occasionally words are mis-transcribed.)    Paul Rodríguez, DO Paul Rodríguez DO  11/18/21 1957

## 2021-11-30 ENCOUNTER — HOSPITAL ENCOUNTER (EMERGENCY)
Age: 61
Discharge: HOME | End: 2021-11-30
Payer: COMMERCIAL

## 2021-11-30 VITALS
DIASTOLIC BLOOD PRESSURE: 72 MMHG | HEART RATE: 59 BPM | RESPIRATION RATE: 16 BRPM | HEIGHT: 67 IN | BODY MASS INDEX: 28.25 KG/M2 | TEMPERATURE: 98.3 F | WEIGHT: 180 LBS | SYSTOLIC BLOOD PRESSURE: 141 MMHG | OXYGEN SATURATION: 99 %

## 2021-11-30 DIAGNOSIS — J06.9 VIRAL URI WITH COUGH: Primary | ICD-10-CM

## 2021-11-30 DIAGNOSIS — Z20.822 COVID-19 RULED OUT BY LABORATORY TESTING: ICD-10-CM

## 2021-11-30 LAB — SARS-COV-2, NAA: NOT  DETECTED

## 2021-11-30 PROCEDURE — 87635 SARS-COV-2 COVID-19 AMP PRB: CPT

## 2021-11-30 PROCEDURE — 99213 OFFICE O/P EST LOW 20 MIN: CPT | Performed by: NURSE PRACTITIONER

## 2021-11-30 PROCEDURE — 99213 OFFICE O/P EST LOW 20 MIN: CPT

## 2021-11-30 ASSESSMENT — ENCOUNTER SYMPTOMS
RHINORRHEA: 1
TROUBLE SWALLOWING: 0
EYE DISCHARGE: 0
VOMITING: 0
SINUS PRESSURE: 0
SORE THROAT: 0
WHEEZING: 0
DIARRHEA: 0
SHORTNESS OF BREATH: 0
SINUS PAIN: 0
COUGH: 1
NAUSEA: 0
CHEST TIGHTNESS: 0
EYE REDNESS: 0

## 2021-11-30 ASSESSMENT — PAIN DESCRIPTION - LOCATION: LOCATION: CHEST

## 2021-11-30 ASSESSMENT — PAIN SCALES - GENERAL: PAINLEVEL_OUTOF10: 7

## 2021-11-30 NOTE — ED NOTES
To [facility] with complaints of chest congestion, cough, nasal congestion. Started around Saturday night/sunday morning.  Unsure if neighbors had covid as they have been sick     Birdia Ormond, RN  11/30/21 3052

## 2021-11-30 NOTE — ED PROVIDER NOTES
[id number]       Chief Complaint   Patient presents with    Cough    Nasal Congestion    Chest Congestion       Nurses Notes reviewed and I agree except as noted in the HPI. HISTORY OF PRESENT ILLNESS   Taniya Mars is a 64 y.o. female who presents with complaints of sinus problems/cough. Onset of symptoms over the weekend with sinus congestion. States symptoms moving into her chest.  Patient complains of intermittent dry cough with sinus/chest congestion. No fever, wheezing, shortness of breath. No travel. No ill exposure. Patient is not vaccinated against COVID-19, last dose March 2021. Does improve with over-the-counter medicine. REVIEW OF SYSTEMS     Review of Systems   Constitutional: Negative for chills, diaphoresis, fatigue and fever. HENT: Positive for congestion, postnasal drip and rhinorrhea. Negative for ear pain, sinus pressure, sinus pain, sore throat and trouble swallowing. Eyes: Negative for discharge and redness. Respiratory: Positive for cough. Negative for chest tightness, shortness of breath and wheezing. Cardiovascular: Negative for chest pain. Gastrointestinal: Negative for diarrhea, nausea and vomiting. Genitourinary: Negative for decreased urine volume. Musculoskeletal: Negative for neck pain and neck stiffness. Skin: Negative for rash. Neurological: Negative for headaches. Hematological: Negative for adenopathy. Psychiatric/Behavioral: Negative for sleep disturbance. PAST MEDICAL HISTORY         Diagnosis Date    Anxiety     Depression     Seizures [facility])        SURGICAL HISTORY     Patient  has a past surgical history that includes Spine surgery; Wrist ganglion excision; cyst removal; tumor removal (1974); knee surgery; Splenectomy; and Capsule endoscopy.     CURRENT MEDICATIONS       Discharge Medication List as of 11/30/2021  8:55 AM      CONTINUE these medications which have NOT CHANGED    Details   aspirin EC 81 MG EC tablet Take 1 tablet by mouth daily, Disp-90 tablet, R-1OTC      pantoprazole (PROTONIX) 20 MG tablet TAKE 1 TABLET BY MOUTH ONCE DAILYHistorical Med      Zinc 100 MG TABS Take by mouthHistorical Med      NONFORMULARY Take 300 mg by mouth daily reloraHistorical Med      Milk Thistle 1000 MG CAPS Take 1,000 mg by mouth dailyHistorical Med      loratadine (CLARITIN) 10 MG tablet Loratadine (Claritin) 10 mg Tablet Active 10 MG PO Daily March 15th, 2021 9:03amHistorical Med      lamoTRIgine (LAMICTAL) 100 MG tablet Take 2.5 tablets by mouth 2 times daily, Disp-150 tablet, R-5Normal      lacosamide (VIMPAT) 50 MG TABS tablet Take 1 tablet by mouth 2 times daily for 90 days. , Disp-60 tablet, R-5Normal      atorvastatin (LIPITOR) 20 MG tablet Take 20 mg by mouth daily Historical Med      traZODone (DESYREL) 50 MG tablet Take 100 mg by mouth nightlyHistorical Med      Ascorbic Acid (VITAMIN C) 1000 MG tablet Take 1,000 mg by mouth 2 times dailyHistorical Med      Cholecalciferol (VITAMIN D3) 50 MCG (2000 UT) CAPS Take by mouth dailyHistorical Med      GLUCOSAMINE-CHONDROITIN PO Take 1,000 mg by mouth daily as needed Historical Med      senna (SENOKOT) 8.6 MG tablet Take 1 tablet by mouth 2 times dailyHistorical Med      ondansetron (ZOFRAN) 8 MG tablet Take 8 mg by mouth every 8 hours as needed for Nausea or VomitingHistorical Med      meclizine (ANTIVERT) 12.5 MG tablet Take 12.5 mg by mouth 3 times daily as neededHistorical Med      FLUoxetine HCl (PROZAC PO) Take 60 mg by mouth daily Historical Med      therapeutic multivitamin-minerals (THERAGRAN-M) tablet Take 1 tablet by mouth daily. Calcium Carbonate (CALTRATE 600 PO) Take by mouth daily Historical Med      clonazePAM (KLONOPIN) 1 MG tablet Take 1 mg by mouth nightly as needed.                ALLERGIES     Patient is is allergic to aspirin-acetaminophen-caffeine, caffeine, and percocet [oxycodone-acetaminophen]. FAMILY HISTORY     Patient'sfamily history includes Arthritis in her maternal grandmother; Cancer in her maternal aunt and mother; Depression in her mother; Diabetes in her father; Early Death in her father and mother; Heart Disease in her father and mother; High Blood Pressure in her father and mother; High Cholesterol in her father and mother; Kidney Disease in her father; Learning Disabilities in her father; Stroke in her paternal grandfather; Substance Abuse in her father. SOCIAL HISTORY     Patient  reports that she has never smoked. She has never used smokeless tobacco. She reports that she does not drink alcohol and does not use drugs. PHYSICAL EXAM     ED TRIAGE VITALS  BP: (!) 141/72, Temp: 98.3 °F (36.8 °C), Pulse: 59, Resp: 16, SpO2: 99 %  Physical Exam  Vitals and nursing note reviewed. Constitutional:       General: She is not in acute distress. Appearance: Normal appearance. She is well-developed. She is not ill-appearing, toxic-appearing or diaphoretic. HENT:      Head: Normocephalic and atraumatic. Right Ear: Hearing, tympanic membrane, ear canal and external ear normal. No mastoid tenderness. No hemotympanum. Tympanic membrane is not perforated, erythematous or bulging. Left Ear: Hearing, tympanic membrane, ear canal and external ear normal. No mastoid tenderness. No hemotympanum. Tympanic membrane is not perforated, erythematous or bulging. Nose: Congestion present. No rhinorrhea. Mouth/Throat:      Mouth: Mucous membranes are moist.      Pharynx: Oropharynx is clear. Uvula midline. Tonsils: No tonsillar abscesses. Eyes:      General: No scleral icterus. Conjunctiva/sclera: Conjunctivae normal.      Right eye: Right conjunctiva is not injected. No hemorrhage. Left eye: Left conjunctiva is not injected. No hemorrhage. Neck:      Thyroid: No thyromegaly.       Trachea: Trachea normal.   Cardiovascular:      Rate and out by laboratory testing        DISPOSITION/PLAN   DISPOSITION Decision To Discharge 11/30/2021 08:54:37 AM  Nontoxic, no distress. COVID-19 negative. Exam consistent with viral URI with cough. Continue over-the-counter medicine. Increase fluids. If symptoms worsen return or go to ER. PATIENT REFERRED TO:  Michelle Marcelo MD  [address] [id number] [facility]  [phone]      Follow up as needed. Continue OTC medication. Increase fluids/rest.  If worse return or go to ER.     DISCHARGE MEDICATIONS:  Discharge Medication List as of 11/30/2021  8:55 AM        Discharge Medication List as of 11/30/2021  8:55 AM          ÁNGELA Lai - ÁNGELA Mayorga - CNP  11/30/21 [address] ÁNGLEA Koehler CNP  11/30/21 [id number]

## 2021-12-10 DIAGNOSIS — G40.909 SEIZURE DISORDER (HCC): Primary | ICD-10-CM

## 2021-12-10 RX ORDER — LAMOTRIGINE 100 MG/1
TABLET ORAL
Qty: 150 TABLET | Refills: 3 | Status: SHIPPED | OUTPATIENT
Start: 2021-12-10 | End: 2022-06-06

## 2021-12-10 NOTE — TELEPHONE ENCOUNTER
Patient not yet scheduled with Dr Mj Posada. Spoke with patient who stated she is scheduled with Dr Anabela Lara at [facility] in January for her seizures.

## 2021-12-14 ENCOUNTER — OFFICE VISIT (OUTPATIENT)
Dept: CARDIOLOGY CLINIC | Age: 61
End: 2021-12-14
Payer: COMMERCIAL

## 2021-12-14 VITALS
DIASTOLIC BLOOD PRESSURE: 70 MMHG | HEIGHT: 67 IN | WEIGHT: 189.8 LBS | BODY MASS INDEX: 29.79 KG/M2 | HEART RATE: 64 BPM | SYSTOLIC BLOOD PRESSURE: 124 MMHG

## 2021-12-14 DIAGNOSIS — Z87.898 HISTORY OF CHEST PAIN: ICD-10-CM

## 2021-12-14 DIAGNOSIS — K21.9 GASTROESOPHAGEAL REFLUX DISEASE, UNSPECIFIED WHETHER ESOPHAGITIS PRESENT: ICD-10-CM

## 2021-12-14 DIAGNOSIS — Z82.49 FAMILY HISTORY OF PREMATURE CAD: ICD-10-CM

## 2021-12-14 DIAGNOSIS — E78.00 PURE HYPERCHOLESTEROLEMIA: Primary | ICD-10-CM

## 2021-12-14 PROBLEM — R94.30 ABNORMAL CARDIAC FUNCTION TEST: Status: RESOLVED | Noted: 2021-10-11 | Resolved: 2021-12-14

## 2021-12-14 PROCEDURE — G8484 FLU IMMUNIZE NO ADMIN: HCPCS | Performed by: INTERNAL MEDICINE

## 2021-12-14 PROCEDURE — 99214 OFFICE O/P EST MOD 30 MIN: CPT | Performed by: INTERNAL MEDICINE

## 2021-12-14 PROCEDURE — G8417 CALC BMI ABV UP PARAM F/U: HCPCS | Performed by: INTERNAL MEDICINE

## 2021-12-14 PROCEDURE — G8427 DOCREV CUR MEDS BY ELIG CLIN: HCPCS | Performed by: INTERNAL MEDICINE

## 2021-12-14 PROCEDURE — 3017F COLORECTAL CA SCREEN DOC REV: CPT | Performed by: INTERNAL MEDICINE

## 2021-12-14 PROCEDURE — 1036F TOBACCO NON-USER: CPT | Performed by: INTERNAL MEDICINE

## 2021-12-14 NOTE — PROGRESS NOTES
Chief Complaint   Patient presents with    3 Month Follow-Up   Originally patient referral for abnormal EKG referred by pcp. Was in the ED 1 week back for heartburn after hot coffee- seen in [facility]  Got GI cocktail in the ED and felt better  Burning type of cp          3 month follow up. EKG done 11-.     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    No known Hx of CAD    Lost 79 lb recently 23/30  Program  And after off her bp meds    Nonsmoker    FHX  Mother had mi at age 46 had CABG  Father had  MI at age late 52's        Past Surgical History:   Procedure Laterality Date    CAPSULE ENDOSCOPY      CYST REMOVAL      right ribcage    KNEE SURGERY      SPINE SURGERY      SPLENECTOMY      TUMOR REMOVAL  1974    stomach    WRIST [address]      right wrist       Allergies   Allergen Reactions    Aspirin-Acetaminophen-Caffeine Nausea And Vomiting     excedrin    Caffeine Nausea And Vomiting    Percocet [Oxycodone-Acetaminophen]         Family History   Problem Relation Age of Onset    Cancer Mother         luekemia    Depression Mother     Early Death Mother     Heart Disease Mother     High Blood Pressure Mother     High Cholesterol Mother     Diabetes Father     Early Death Father     Kidney Disease Father     Heart Disease Father     High Blood Pressure Father     High Cholesterol Father     Learning Disabilities Father     Substance Abuse Father     Cancer Maternal Aunt     Arthritis Maternal Grandmother     Stroke Paternal Grandfather         Social History     Socioeconomic History    Marital status:      Spouse name: Not on file    Number of children: Not on file    Years of education: Not on file    Highest education level: Not on file   Occupational History    Not on file   Tobacco Use    Smoking status: Never Smoker    Smokeless tobacco: Never Used   Vaping Use    Vaping Use: Never used   Substance and Sexual Activity    Alcohol use: Never    Drug use: No    Sexual activity: Yes     Partners: Male   Other Topics Concern    Not on file   Social History Narrative    Not on file     Social Determinants of Health     Financial Resource Strain:     Difficulty of Paying Living Expenses: Not on file   Food Insecurity:     Worried About Running Out of Food in the Last Year: Not on file    Chapo of Food in the Last Year: Not on file   Transportation Needs:     Lack of Transportation (Medical): Not on file    Lack of Transportation (Non-Medical):  Not on file   Physical Activity:     Days of Exercise per Week: Not on file    Minutes of Exercise per Session: Not on file   Stress:     Feeling of Stress : Not on file   Social Connections:     Frequency of Communication with Friends and Family: Not on file    Frequency of Social Gatherings with Friends and Family: Not on file    Attends Orthodox Services: Not on file    Active Member of [address] [place] [address] or Organizations: Not on file    Attends Club or Organization Meetings: Not on file    Marital Status: Not on file   Intimate Partner Violence:     Fear of Current or Ex-Partner: Not on file    Emotionally Abused: Not on file    Physically Abused: Not on file    Sexually Abused: Not on file   Housing Stability:     Unable to Pay for Housing in the Last Year: Not on file    Number of [place] in the Last Year: Not on file    Unstable Housing in the Last Year: Not on file       Current Outpatient Medications   Medication Sig Dispense Refill    lamoTRIgine (LAMICTAL) 100 MG tablet TAKE 2 & 1/2 (TWO & ONE-HALF) TABLETS BY MOUTH TWICE DAILY 150 tablet 3    aspirin EC 81 MG EC tablet Take 1 tablet by mouth daily 90 tablet 1    pantoprazole (PROTONIX) 20 MG tablet TAKE 1 TABLET BY MOUTH ONCE DAILY      Zinc 100 MG TABS Take by mouth  NONFORMULARY Take 300 mg by mouth daily relora      Milk Thistle 1000 MG CAPS Take 1,000 mg by mouth daily      loratadine (CLARITIN) 10 MG tablet Loratadine (Claritin) 10 mg Tablet Active 10 MG PO Daily March 15th, 2021 9:03am      atorvastatin (LIPITOR) 20 MG tablet Take 20 mg by mouth daily       traZODone (DESYREL) 50 MG tablet Take 100 mg by mouth nightly      Ascorbic Acid (VITAMIN C) 1000 MG tablet Take 1,000 mg by mouth 2 times daily      Cholecalciferol (VITAMIN D3) 50 MCG (2000 UT) CAPS Take by mouth daily      GLUCOSAMINE-CHONDROITIN PO Take 1,000 mg by mouth daily as needed       senna (SENOKOT) 8.6 MG tablet Take 1 tablet by mouth 2 times daily      ondansetron (ZOFRAN) 8 MG tablet Take 8 mg by mouth every 8 hours as needed for Nausea or Vomiting      meclizine (ANTIVERT) 12.5 MG tablet Take 12.5 mg by mouth 3 times daily as needed      FLUoxetine HCl (PROZAC PO) Take 60 mg by mouth daily       therapeutic multivitamin-minerals (THERAGRAN-M) tablet Take 1 tablet by mouth daily.  Calcium Carbonate (CALTRATE 600 PO) Take by mouth daily       clonazePAM (KLONOPIN) 1 MG tablet Take 1 mg by mouth nightly as needed.  lacosamide (VIMPAT) 50 MG TABS tablet Take 1 tablet by mouth 2 times daily for 90 days. 60 tablet 5     No current facility-administered medications for this visit. Review of Systems -     General ROS: negative  Psychological ROS: negative  Hematological and Lymphatic ROS: No history of blood clots or bleeding disorder. Respiratory ROS: no cough,  or wheezing, the rest see HPI  Cardiovascular ROS: See HPI  Gastrointestinal ROS: negative  Genito-Urinary ROS: no dysuria, trouble voiding, or hematuria  Musculoskeletal ROS: negative  Neurological ROS: no TIA or stroke symptoms  Dermatological ROS: negative      Blood pressure 124/70, pulse 64, height 5' 7\" (1.702 m), weight 189 lb 12.8 oz (86.1 kg).         Physical Examination:    General appearance - alert, well appearing, and in no distress  HEENT- Pink conjunctiva  , Non-icteri sclera,PERRLA  Mental status - alert, oriented to person, place, and time  Neck - supple, no significant adenopathy, no JVD, or carotid bruits  Chest - clear to auscultation, no wheezes, rales or rhonchi, symmetric air entry  Heart - normal rate, regular rhythm, normal S1, S2, no murmurs, rubs, clicks or gallops  Abdomen - soft, nontender, nondistended, no masses or organomegaly  DOMINIC- no CVA or flank tenderness, no suprapubic tenderness  Neurological - alert, oriented, normal speech, no focal findings or movement disorder noted  Musculoskeletal/limbs - no joint tenderness, deformity or swelling   - peripheral pulses normal, no pedal edema, no clubbing or cyanosis  Skin - normal coloration and turgor, no rashes, no suspicious skin lesions noted  Psych- appropriate mood and affect    Lab  No results for input(s): CKTOTAL, CKMB, CKMBINDEX, TROPONINI in the last 72 hours.   CBC:   Lab Results   Component Value Date    WBC 8.8 08/20/2021    RBC 4.68 08/20/2021    RBC 3.68 10/23/2011    HGB 14.6 08/20/2021    HCT 44.0 08/20/2021    MCV 94.1 08/20/2021    MCH 31.2 08/20/2021    MCHC 33.1 08/20/2021    RDW 14.4 08/20/2021     08/20/2021    MPV 11.0 11/09/2020     BMP:    Lab Results   Component Value Date     08/20/2021    K 4.4 08/20/2021     08/20/2021    CO2 28 08/20/2021    BUN 20 08/20/2021    LABALBU 4.4 08/20/2021    CREATININE 0.91 08/20/2021    CALCIUM 9.80 08/20/2021    LABGLOM 74 11/09/2020    GLUCOSE 85 08/20/2021     Hepatic Function Panel:    Lab Results   Component Value Date    ALKPHOS 61 08/20/2021    ALT 22 08/20/2021    AST 26 08/20/2021    PROT 7.0 08/20/2021    BILITOT 0.6 08/20/2021    BILIDIR 0.1 08/20/2021    LABALBU 4.4 08/20/2021     Magnesium:    Lab Results   Component Value Date    MG 2.3 11/09/2020     Warfarin PT/INR:  No components found for: PTPATWAR, PTINRWAR  HgBA1c:  No results found for: LABA1C  FLP:    Lab Results   Component Value Date    TRIG 76 08/25/2021    HDL 61 08/25/2021    LDLCALC 71 08/25/2021    LABVLDL 15 08/25/2021     TSH:    Lab Results   Component Value Date    TSH 1.650 11/09/2020     TTT  CONCLUSIONS:  1. This is a benign tilt table study. 2.  Tilt table test negative for vasovagal response. 3.  Tilt table test negative for orthostatic hypotension. 4.  Tilt table test negative for POT syndrome (postural orthostatic  tachycardia syndrome). Marisel Hyatt M.D.  D: 01/12/2021 20:19:06        Event monitor  CONCLUSION:  1. Sinus rhythm. 2.  Unremarkable event monitor for any significant arrhythmias. SHELIA Kamara Prior: 05/17/2021 7:50:39           Stress Type: Exercise      Peak HR: 119 bpm           HR Response: Appropriate   Peak BP: 164/76 mmHg       BP Response: Normal Resting BP with appropriate   Predicted HR: 162 bpm      response to Stress   % of predicted HR: 73      HR BP Product: 79580   Test duration:9 min        Max Exercise: 10.1 METS      Reason for Termination:    Exercise Effort: Fair   Chest pain      Stress Interpretation      Patient was having limiting SOB. No stress induced arrhythmia. The did develop chest pain /tightness 8th min in to the exercise like 6/10   and resolved 4 min in to the recovery. Exercise EKG stress test is non diagnostic for ischemia because target HR   was not achieved and exercise/threadmill/ terminated by request of the   patient due to chest pain      Conclusions    Summary   The did develop chest pain /tightness 8th min in to the exercise like 6/10   and resolved 4 min in to the recovery. Exercise EKG stress test is non diagnostic for ischemia because minimum   target HR was not achieved and exercise/threadmill/ terminated by request   of the patient due to chest pain. Recommendation   consider Pharmacologic nuclear stress test if clinically indicated   Clinical correlation is recommended.       Signatures ----------------------------------------------------------------   Electronically signed by Efrain Marcus MD (Performing   Physician) on 09/09/2021 at 14:00   ----------------------------------------------------------------      [place] nuc stress   Conclusions    Summary  \gated EF 64%   Lexiscan EKG stress test is not suggestive for ischemia. The nuclear images is not suggestive for myocardial ischemia. Signatures    ----------------------------------------------------------------   Electronically signed by Efrain Marcus MD (Interpreting   Cardiologist) on 10/27/2021 at 18:46   ----------------------------------------------------------------      ekg nov 2020  Normal sinus rhythm Normal ECG No previous ECGs available    08/20/21  NSR, no acute abn    Assessment     Diagnosis Orders   1. Pure hypercholesterolemia     2. Family history of premature CAD     3. Gastroesophageal reflux disease, unspecified whether esophagitis present     4. History of chest pain         Plan     Meds and labs reviewed    Continue the current treatment and with constant vigilance to changes in symptoms and also any potential side effects. Return for care or seek medical attention immediately if symptoms got worse and/or develop new symptoms. Hx of Chest pain atypical  Resolved after esophageal dilatation  FHx of CAD  INS declined echo   Regular stress test was nondiagnostic as target HR was not achieved due to cp  Post esophag  Dilatation - no more cp but during stress test  The  [place] nuc stress test- negative    Hx of GERD  Advised to take protonix daily  Cont  asa 81  Po qd due to Family Hx  D/w the pat the plan of care     Hyperlipidemia: on statins, followed periodically. Patient need periodic lipid and liver profile. Record [facility] ED of [facility] reviewed    Advised to f/u pcp    Advised to get to ER if any recurrence of cp    D/w the pat the plan of care    Discussed use, benefit, and side effects of prescribed medications. All patient questions answered. Pt voiced understanding. Instructed to continue current medications, diet and exercise. Continue risk factor modification and medical management. Patient agreed with treatment plan. Follow up as directed.       RTC IN 12 months    Awa Sams [facility]

## 2022-06-06 DIAGNOSIS — G40.909 SEIZURE DISORDER (HCC): ICD-10-CM

## 2022-06-06 RX ORDER — LAMOTRIGINE 100 MG/1
TABLET ORAL
Qty: 150 TABLET | Refills: 2 | Status: SHIPPED | OUTPATIENT
Start: 2022-06-06

## 2022-06-06 NOTE — TELEPHONE ENCOUNTER
Spoke with patient who stated she was evaluated by [facility] neurology. She has not yet completed the EEG they requested. Gave patient number to contact [facility] Neurology to proceed with EEG. Patient verbalized understanding.

## 2022-12-12 ENCOUNTER — HOSPITAL ENCOUNTER (OUTPATIENT)
Dept: WOMENS IMAGING | Age: 62
Discharge: HOME | End: 2022-12-12
Payer: MEDICARE

## 2022-12-12 DIAGNOSIS — Z12.31 VISIT FOR SCREENING MAMMOGRAM: ICD-10-CM

## 2022-12-12 PROCEDURE — 77063 BREAST TOMOSYNTHESIS BI: CPT

## 2023-03-04 ENCOUNTER — HOSPITAL ENCOUNTER (EMERGENCY)
Age: 63
Discharge: HOME | End: 2023-03-04
Payer: MEDICARE

## 2023-03-04 VITALS
SYSTOLIC BLOOD PRESSURE: 119 MMHG | BODY MASS INDEX: 32.96 KG/M2 | HEART RATE: 61 BPM | OXYGEN SATURATION: 95 % | WEIGHT: 210 LBS | HEIGHT: 67 IN | RESPIRATION RATE: 16 BRPM | TEMPERATURE: 97.1 F | DIASTOLIC BLOOD PRESSURE: 78 MMHG

## 2023-03-04 DIAGNOSIS — G57.02 PIRIFORMIS SYNDROME OF LEFT SIDE: Primary | ICD-10-CM

## 2023-03-04 PROCEDURE — 96372 THER/PROPH/DIAG INJ SC/IM: CPT

## 2023-03-04 PROCEDURE — 6360000002 HC RX W HCPCS: Performed by: NURSE PRACTITIONER

## 2023-03-04 PROCEDURE — 99213 OFFICE O/P EST LOW 20 MIN: CPT | Performed by: NURSE PRACTITIONER

## 2023-03-04 PROCEDURE — 99213 OFFICE O/P EST LOW 20 MIN: CPT

## 2023-03-04 RX ORDER — KETOROLAC TROMETHAMINE 30 MG/ML
30 INJECTION, SOLUTION INTRAMUSCULAR; INTRAVENOUS ONCE
Status: COMPLETED | OUTPATIENT
Start: 2023-03-04 | End: 2023-03-04

## 2023-03-04 RX ORDER — CYCLOBENZAPRINE HCL 10 MG
10 TABLET ORAL 3 TIMES DAILY PRN
Qty: 15 TABLET | Refills: 0 | Status: SHIPPED | OUTPATIENT
Start: 2023-03-04

## 2023-03-04 RX ORDER — LIDOCAINE 50 MG/G
OINTMENT TOPICAL
Qty: 50 G | Refills: 0 | Status: SHIPPED | OUTPATIENT
Start: 2023-03-04

## 2023-03-04 RX ADMIN — KETOROLAC TROMETHAMINE 30 MG: 30 INJECTION, SOLUTION INTRAMUSCULAR at 10:31

## 2023-03-04 ASSESSMENT — PAIN - FUNCTIONAL ASSESSMENT: PAIN_FUNCTIONAL_ASSESSMENT: 0-10

## 2023-03-04 ASSESSMENT — PAIN DESCRIPTION - PAIN TYPE: TYPE: ACUTE PAIN

## 2023-03-04 ASSESSMENT — ENCOUNTER SYMPTOMS
SHORTNESS OF BREATH: 0
NAUSEA: 0
BACK PAIN: 1
VOMITING: 0

## 2023-03-04 ASSESSMENT — PAIN DESCRIPTION - ONSET: ONSET: ON-GOING

## 2023-03-04 ASSESSMENT — PAIN SCALES - GENERAL
PAINLEVEL_OUTOF10: 9
PAINLEVEL_OUTOF10: 9

## 2023-03-04 ASSESSMENT — PAIN DESCRIPTION - FREQUENCY: FREQUENCY: CONTINUOUS

## 2023-03-04 ASSESSMENT — PAIN DESCRIPTION - DESCRIPTORS: DESCRIPTORS: ACHING;DULL;TIGHTNESS

## 2023-03-04 ASSESSMENT — PAIN DESCRIPTION - LOCATION: LOCATION: BACK

## 2023-03-04 ASSESSMENT — PAIN DESCRIPTION - ORIENTATION: ORIENTATION: LEFT;LOWER

## 2023-03-04 NOTE — ED PROVIDER NOTES
[facility]  Ur[facility] Encounter       CHIEF COMPLAINT       Chief Complaint   Patient presents with    Back Pain     Left lower back pain, diarrhea       Nurses Notes reviewed and I agree except as noted in the HPI. HISTORY OF PRESENT ILLNESS   Jennifer Bauer is a 63 y.o. female who presents for evaluation of left lower back pain that is been ongoing for the past week. Patient states the pain radiates around to the front/groin area. She denies any urinary complaints and denies any fever, chills, nausea, or vomiting. She denies any numbness or tingling to the lower extremities or any loss of bowel or bladder control. She denies any specific injury or trauma to the area. She states that she has been taking ibuprofen and Tylenol at home with minimal relief of the symptoms. The history is provided by the patient. REVIEW OF SYSTEMS     Review of Systems   Constitutional:  Negative for chills and fever. Respiratory:  Negative for shortness of breath. Cardiovascular:  Negative for chest pain. Gastrointestinal:  Negative for nausea and vomiting. Musculoskeletal:  Positive for back pain. Negative for arthralgias, gait problem and myalgias. Skin:  Negative for rash. Neurological:  Negative for weakness and numbness. Hematological:  Does not bruise/bleed easily. PAST MEDICAL HISTORY         Diagnosis Date    Anxiety     Depression     Seizures ([facility] [address]        SURGICALHISTORY     Patient  has a past surgical history that includes Spine surgery; Wrist ganglion excision; cyst removal; tumor removal (1974); knee surgery; Splenectomy; and Capsule endoscopy.     CURRENT MEDICATIONS       Previous Medications    ASCORBIC ACID (VITAMIN C) 1000 MG TABLET    Take 1,000 mg by mouth 2 times daily    ASPIRIN EC 81 MG EC TABLET    Take 1 tablet by mouth daily    ATORVASTATIN (LIPITOR) 20 MG TABLET    Take 20 mg by mouth daily     CALCIUM CARBONATE (CALTRATE 600 PO)    Take by mouth daily     CHOLECALCIFEROL (VITAMIN D3) 50 MCG (2000 UT) CAPS    Take by mouth daily    CLONAZEPAM (KLONOPIN) 1 MG TABLET    Take 1 mg by mouth nightly as needed. FLUOXETINE HCL (PROZAC PO)    Take 60 mg by mouth daily     GLUCOSAMINE-CHONDROITIN PO    Take 1,000 mg by mouth daily as needed     LACOSAMIDE (VIMPAT) 50 MG TABS TABLET    Take 1 tablet by mouth 2 times daily for 90 days. LAMOTRIGINE (LAMICTAL) 100 MG TABLET    TAKE 2 & 1/2 (TWO & ONE-HALF) TABLETS BY MOUTH TWICE DAILY    LORATADINE (CLARITIN) 10 MG TABLET    Loratadine (Claritin) 10 mg Tablet Active 10 MG PO Daily March 15th, 2021 9:03am    MECLIZINE (ANTIVERT) 12.5 MG TABLET    Take 12.5 mg by mouth 3 times daily as needed    MILK THISTLE 1000 MG CAPS    Take 1,000 mg by mouth daily    NONFORMULARY    Take 300 mg by mouth daily relora    ONDANSETRON (ZOFRAN) 8 MG TABLET    Take 8 mg by mouth every 8 hours as needed for Nausea or Vomiting    PANTOPRAZOLE (PROTONIX) 20 MG TABLET    TAKE 1 TABLET BY MOUTH ONCE DAILY    SENNA (SENOKOT) 8.6 MG TABLET    Take 1 tablet by mouth 2 times daily    THERAPEUTIC MULTIVITAMIN-MINERALS (THERAGRAN-M) TABLET    Take 1 tablet by mouth daily. TRAZODONE (DESYREL) 50 MG TABLET    Take 100 mg by mouth nightly    ZINC 100 MG TABS    Take by mouth       ALLERGIES     Patient is is allergic to aspirin-acetaminophen-caffeine, caffeine, percocet [oxycodone-acetaminophen], and prednisone.     Patients   Immunization History   Administered Date(s) Administered    COVID-19, MODERNA BLUE border, Primary or Immunocompromised, (age 12y+), IM, 100 mcg/0.5mL 02/25/2021, 03/25/2021    COVID-19, PFIZER Bivalent BOOSTER, DO NOT Dilute, (age 12y+), IM, 30 mcg/0.3 mL 09/29/2022    Influenza, FLUARIX, FLULAVAL, FLUZONE (age 10 mo+) AND AFLURIA, (age 1 y+), PF, 0.5mL 08/28/2020    Pneumococcal Polysaccharide (Axhdrycuo99) 08/28/2018    Td, (Adult) Not Adsorbed 09/08/2019    Zoster Recombinant (Shingrix) 03/11/2020       FAMILY HISTORY     Patient's family history includes Arthritis in her maternal grandmother; Cancer in her maternal aunt and mother; Depression in her mother; Diabetes in her father; Early Death in her father and mother; Heart Disease in her father and mother; High Blood Pressure in her father and mother; High Cholesterol in her father and mother; Kidney Disease in her father; Learning Disabilities in her father; Stroke in her paternal grandfather; Substance Abuse in her father. SOCIAL HISTORY     Patient  reports that she has never smoked. She has never used smokeless tobacco. She reports that she does not drink alcohol and does not use drugs. PHYSICAL EXAM     ED TRIAGE VITALS  BP: 118/77, Temp: 97.1 °F (36.2 °C), Heart Rate: 68, Resp: 16, SpO2: 95 %,Estimated body mass index is 32.89 kg/m² as calculated from the following:    Height as of this encounter: 5' 7\" (1.702 m). Weight as of this encounter: 210 lb (95.3 kg). ,No LMP recorded. Patient is postmenopausal.    Physical Exam  Vitals and nursing note reviewed. Constitutional:       General: She is not in acute distress. Appearance: She is well-developed. She is not diaphoretic. Eyes:      Conjunctiva/sclera:      Right eye: Right conjunctiva is not injected. Left eye: Left conjunctiva is not injected. Pupils: Pupils are equal.   Cardiovascular:      Rate and Rhythm: Normal rate and regular rhythm. Heart sounds: No murmur heard. Pulmonary:      Effort: Pulmonary effort is normal. No respiratory distress. Breath sounds: Normal breath sounds. Musculoskeletal:      Cervical back: Normal range of motion. Lumbar back: No tenderness or bony tenderness. Normal range of motion. Back:    Skin:     General: Skin is warm. Findings: No rash. Neurological:      Mental Status: She is alert and oriented to person, place, and time.    Psychiatric:         Behavior: Behavior normal.       DIAGNOSTIC RESULTS     Labs:No results found for this visit on 03/04/23. IMAGING:    No orders to display         EKG:      URGENT CARE COURSE:     Vitals:    03/04/23 0951   BP: 118/77   Pulse: 68   Resp: 16   Temp: 97.1 °F (36.2 °C)   TempSrc: Temporal   SpO2: 95%   Weight: 210 lb (95.3 kg)   Height: 5' 7\" (1.702 m)       Medications   ketorolac (TORADOL) injection 30 mg (30 mg IntraMUSCular Given 3/4/23 1031)            PROCEDURES:  None    FINAL IMPRESSION      1. Piriformis syndrome of left side          DISPOSITION/ PLAN       Physical exam reveals tenderness more to the upper gluteal area than in the back itself. Pain does radiate around to the groin I discussed with the patient that this is concerning for piriformis type syndrome. Patient states that she does not tolerate prednisone well I discussed the plan to continue ibuprofen at home and she will be given muscle relaxers and lidocaine ointment. She is instructed apply ice/heat and was given a shot of Toradol in the urgent care. She is advised to follow-up if symptoms do not improve or worsen. She is agreeable to plan as discussed. PATIENT REFERRED TO:  Barbie Polanco MD  [address] [facility] [address]      DISCHARGE MEDICATIONS:  New Prescriptions    CYCLOBENZAPRINE (FLEXERIL) 10 MG TABLET    Take 1 tablet by mouth 3 times daily as needed for Muscle spasms . Do not drive or operate heavy machinery while taking this medication. LIDOCAINE (XYLOCAINE) 5 % OINTMENT    Apply topically as needed.        Discontinued Medications    No medications on file       Current Discharge Medication List          ÁNGELA Millan CNP    (Please note that portions of this note were completed with a voice recognition program. Efforts were made to edit the dictations but occasionally words are mis-transcribed.)           ÁNGELA Millan CNP  03/04/23 1038

## 2023-04-05 ENCOUNTER — HOSPITAL ENCOUNTER (EMERGENCY)
Age: 63
Discharge: HOME | End: 2023-04-05
Payer: MEDICARE

## 2023-04-05 VITALS
HEART RATE: 83 BPM | DIASTOLIC BLOOD PRESSURE: 98 MMHG | OXYGEN SATURATION: 95 % | SYSTOLIC BLOOD PRESSURE: 131 MMHG | TEMPERATURE: 99.5 F

## 2023-04-05 DIAGNOSIS — H60.502 ACUTE OTITIS EXTERNA OF LEFT EAR, UNSPECIFIED TYPE: ICD-10-CM

## 2023-04-05 DIAGNOSIS — H83.02 INFECTION OF LEFT INNER EAR: Primary | ICD-10-CM

## 2023-04-05 LAB — S PYO AG THROAT QL: NEGATIVE

## 2023-04-05 PROCEDURE — 99213 OFFICE O/P EST LOW 20 MIN: CPT | Performed by: NURSE PRACTITIONER

## 2023-04-05 PROCEDURE — 87651 STREP A DNA AMP PROBE: CPT

## 2023-04-05 RX ORDER — OFLOXACIN 3 MG/ML
5 SOLUTION AURICULAR (OTIC) 2 TIMES DAILY
Qty: 10 ML | Refills: 0 | Status: SHIPPED | OUTPATIENT
Start: 2023-04-05 | End: 2023-04-12

## 2023-04-05 RX ORDER — AMOXICILLIN AND CLAVULANATE POTASSIUM 875; 125 MG/1; MG/1
1 TABLET, FILM COATED ORAL 2 TIMES DAILY WITH MEALS
Qty: 20 TABLET | Refills: 0 | Status: SHIPPED | OUTPATIENT
Start: 2023-04-05 | End: 2023-04-15

## 2023-04-05 ASSESSMENT — ENCOUNTER SYMPTOMS
NAUSEA: 0
EYE ITCHING: 0
DIARRHEA: 0
EYE REDNESS: 0
SORE THROAT: 1
VOICE CHANGE: 1
VOMITING: 0
COUGH: 1
SHORTNESS OF BREATH: 0
SINUS PRESSURE: 0
ABDOMINAL PAIN: 0
CHEST TIGHTNESS: 0

## 2023-04-05 ASSESSMENT — PAIN - FUNCTIONAL ASSESSMENT: PAIN_FUNCTIONAL_ASSESSMENT: 0-10

## 2023-04-05 ASSESSMENT — PAIN DESCRIPTION - LOCATION: LOCATION: EAR

## 2023-04-05 ASSESSMENT — PAIN SCALES - GENERAL: PAINLEVEL_OUTOF10: 8

## 2023-04-05 ASSESSMENT — PAIN DESCRIPTION - PAIN TYPE: TYPE: ACUTE PAIN

## 2023-04-05 NOTE — ED NOTES
Left ear irrigated with  warm water, and a  return of moderate amt. of dark yellow cerumen. Tolerated without complaint  tm in tact but canal and tm is  red.      Jacques Syed, AARONN  47/49/27 95

## 2023-04-05 NOTE — ED PROVIDER NOTES
by mouth 3 times daily as needed for Muscle spasms . Do not drive or operate heavy machinery while taking this medication. , Disp-15 tablet, R-0Normal      lidocaine (XYLOCAINE) 5 % ointment Apply topically as needed. , Disp-50 g, R-0, Normal      lamoTRIgine (LAMICTAL) 100 MG tablet TAKE 2 & 1/2 (TWO & ONE-HALF) TABLETS BY MOUTH TWICE DAILY, Disp-150 tablet, R-2Normal      aspirin EC 81 MG EC tablet Take 1 tablet by mouth daily, Disp-90 tablet, R-1OTC      pantoprazole (PROTONIX) 20 MG tablet TAKE 1 TABLET BY MOUTH ONCE DAILYHistorical Med      Zinc 100 MG TABS Take by mouthHistorical Med      NONFORMULARY Take 300 mg by mouth daily reloraHistorical Med      Milk Thistle 1000 MG CAPS Take 1,000 mg by mouth dailyHistorical Med      loratadine (CLARITIN) 10 MG tablet Loratadine (Claritin) 10 mg Tablet Active 10 MG PO Daily March 15th, 2021 9:03amHistorical Med      lacosamide (VIMPAT) 50 MG TABS tablet Take 1 tablet by mouth 2 times daily for 90 days. , Disp-60 tablet, R-5Normal      atorvastatin (LIPITOR) 20 MG tablet Take 20 mg by mouth daily Historical Med      traZODone (DESYREL) 50 MG tablet Take 100 mg by mouth nightlyHistorical Med      Ascorbic Acid (VITAMIN C) 1000 MG tablet Take 1,000 mg by mouth 2 times dailyHistorical Med      Cholecalciferol (VITAMIN D3) 50 MCG (2000 UT) CAPS Take by mouth dailyHistorical Med      GLUCOSAMINE-CHONDROITIN PO Take 1,000 mg by mouth daily as needed Historical Med      senna (SENOKOT) 8.6 MG tablet Take 1 tablet by mouth 2 times dailyHistorical Med      ondansetron (ZOFRAN) 8 MG tablet Take 8 mg by mouth every 8 hours as needed for Nausea or VomitingHistorical Med      meclizine (ANTIVERT) 12.5 MG tablet Take 12.5 mg by mouth 3 times daily as neededHistorical Med      FLUoxetine HCl (PROZAC PO) Take 60 mg by mouth daily Historical Med      therapeutic multivitamin-minerals (THERAGRAN-M) tablet Take 1 tablet by mouth daily.         Calcium Carbonate (CALTRATE 600 PO) Take by

## 2023-05-24 DIAGNOSIS — G40.909 SEIZURE DISORDER (HCC): ICD-10-CM

## 2023-05-24 RX ORDER — LAMOTRIGINE 100 MG/1
TABLET ORAL
Qty: 150 TABLET | Refills: 0 | OUTPATIENT
Start: 2023-05-24

## 2023-09-08 ENCOUNTER — APPOINTMENT (OUTPATIENT)
Dept: GENERAL RADIOLOGY | Age: 63
End: 2023-09-08
Payer: MEDICARE

## 2023-09-08 ENCOUNTER — HOSPITAL ENCOUNTER (EMERGENCY)
Age: 63
Discharge: HOME | End: 2023-09-08
Payer: MEDICARE

## 2023-09-08 VITALS
HEART RATE: 77 BPM | RESPIRATION RATE: 20 BRPM | TEMPERATURE: 99 F | SYSTOLIC BLOOD PRESSURE: 120 MMHG | OXYGEN SATURATION: 98 % | HEIGHT: 67 IN | BODY MASS INDEX: 33.59 KG/M2 | WEIGHT: 214 LBS | DIASTOLIC BLOOD PRESSURE: 86 MMHG

## 2023-09-08 DIAGNOSIS — J40 BRONCHITIS: Primary | ICD-10-CM

## 2023-09-08 PROCEDURE — 93010 ELECTROCARDIOGRAM REPORT: CPT | Performed by: INTERNAL MEDICINE

## 2023-09-08 PROCEDURE — 99213 OFFICE O/P EST LOW 20 MIN: CPT

## 2023-09-08 PROCEDURE — 99214 OFFICE O/P EST MOD 30 MIN: CPT | Performed by: NURSE PRACTITIONER

## 2023-09-08 PROCEDURE — 71046 X-RAY EXAM CHEST 2 VIEWS: CPT

## 2023-09-08 PROCEDURE — 93005 ELECTROCARDIOGRAM TRACING: CPT | Performed by: NURSE PRACTITIONER

## 2023-09-08 RX ORDER — ALBUTEROL SULFATE 90 UG/1
2 AEROSOL, METERED RESPIRATORY (INHALATION) EVERY 6 HOURS PRN
Qty: 1 EACH | Refills: 0 | Status: SHIPPED | OUTPATIENT
Start: 2023-09-08

## 2023-09-08 RX ORDER — GUAIFENESIN AND DEXTROMETHORPHAN HYDROBROMIDE 600; 30 MG/1; MG/1
2 TABLET, EXTENDED RELEASE ORAL EVERY 12 HOURS
Qty: 28 TABLET | Refills: 0 | Status: SHIPPED | OUTPATIENT
Start: 2023-09-08 | End: 2023-09-15

## 2023-09-08 ASSESSMENT — ENCOUNTER SYMPTOMS
WHEEZING: 0
DIARRHEA: 0
COUGH: 1
TROUBLE SWALLOWING: 0
NAUSEA: 0
RHINORRHEA: 0
VOMITING: 0
EYE REDNESS: 0
EYE DISCHARGE: 0
SHORTNESS OF BREATH: 1
SORE THROAT: 0

## 2023-09-08 ASSESSMENT — PAIN - FUNCTIONAL ASSESSMENT: PAIN_FUNCTIONAL_ASSESSMENT: NONE - DENIES PAIN

## 2023-09-08 NOTE — ED NOTES
Pt with complaints of a cough, nasal congestion and shortness of breath that started 3 days ago and got worse today. States it feels as if her lungs aren't taking in enough air. States cough is dry. Denies being around anyone sick. Denies any pain.      Domonique Jimenes, CHINO  76/81/78 [id number]

## 2023-09-08 NOTE — DISCHARGE INSTRUCTIONS
Take medications as prescribed. Use inhaler or breathing treatments as prescribed. Ensure you are getting enough rest and that you are drinking fluids at least every 6 hours. Symptoms may take up to 10 days to resolve. Seek medical treatment for symptoms that last >10 days, fever >101.5 for >3 days, inability to keep fluids down, or other worrisome symptoms develop.

## 2023-09-10 LAB
EKG ATRIAL RATE: 76 BPM
EKG P AXIS: 64 DEGREES
EKG P-R INTERVAL: 138 MS
EKG Q-T INTERVAL: 386 MS
EKG QRS DURATION: 78 MS
EKG QTC CALCULATION (BAZETT): 434 MS
EKG R AXIS: 46 DEGREES
EKG T AXIS: 48 DEGREES
EKG VENTRICULAR RATE: 76 BPM

## 2023-12-30 ENCOUNTER — HOSPITAL ENCOUNTER (EMERGENCY)
Age: 63
Discharge: HOME | End: 2023-12-30
Payer: MEDICARE

## 2023-12-30 VITALS
SYSTOLIC BLOOD PRESSURE: 132 MMHG | TEMPERATURE: 98.9 F | DIASTOLIC BLOOD PRESSURE: 85 MMHG | HEIGHT: 66 IN | RESPIRATION RATE: 20 BRPM | WEIGHT: 210 LBS | BODY MASS INDEX: 33.75 KG/M2 | HEART RATE: 80 BPM | OXYGEN SATURATION: 97 %

## 2023-12-30 DIAGNOSIS — J20.9 ACUTE BRONCHITIS, UNSPECIFIED ORGANISM: Primary | ICD-10-CM

## 2023-12-30 LAB
FLUAV AG SPEC QL: NEGATIVE
FLUBV AG SPEC QL: NEGATIVE
SARS-COV-2 RDRP RESP QL NAA+PROBE: NOT  DETECTED

## 2023-12-30 PROCEDURE — 87635 SARS-COV-2 COVID-19 AMP PRB: CPT

## 2023-12-30 PROCEDURE — 87804 INFLUENZA ASSAY W/OPTIC: CPT

## 2023-12-30 PROCEDURE — 99213 OFFICE O/P EST LOW 20 MIN: CPT

## 2023-12-30 RX ORDER — METHYLPREDNISOLONE 4 MG/1
TABLET ORAL
Qty: 1 KIT | Refills: 0 | Status: SHIPPED | OUTPATIENT
Start: 2023-12-30 | End: 2024-01-05

## 2023-12-30 RX ORDER — GUAIFENESIN 600 MG/1
600 TABLET, EXTENDED RELEASE ORAL 2 TIMES DAILY
Qty: 20 TABLET | Refills: 0 | Status: SHIPPED | OUTPATIENT
Start: 2023-12-30 | End: 2024-01-09

## 2023-12-30 RX ORDER — DEXTROMETHORPHAN HYDROBROMIDE AND PROMETHAZINE HYDROCHLORIDE 15; 6.25 MG/5ML; MG/5ML
5 SYRUP ORAL 4 TIMES DAILY PRN
Qty: 118 ML | Refills: 0 | Status: SHIPPED | OUTPATIENT
Start: 2023-12-30 | End: 2024-01-07

## 2023-12-30 RX ORDER — DOXYCYCLINE HYCLATE 100 MG
100 TABLET ORAL 2 TIMES DAILY
Qty: 20 TABLET | Refills: 0 | Status: SHIPPED | OUTPATIENT
Start: 2023-12-30 | End: 2024-01-09

## 2023-12-30 ASSESSMENT — PAIN - FUNCTIONAL ASSESSMENT: PAIN_FUNCTIONAL_ASSESSMENT: 0-10

## 2023-12-30 NOTE — ED TRIAGE NOTES
Pt to urgent care due to a congested cough and sinus drainage. Pt thinks she had a fever last night that broke. She was out of town and became ill while visiting with family.

## 2023-12-30 NOTE — ED PROVIDER NOTES
[facility] URGENT CARE  Urgent Care Encounter      CHIEF COMPLAINT       Chief Complaint   Patient presents with    Cough    Sinusitis       Nurses Notes reviewed and I agree except as noted in the HPI.  HISTORY OF PRESENT ILLNESS   Gale Kelsey is a 63 y.o. female who presents with flulike symptoms with fever chills starting 2 days ago.  Denies nausea vomiting diarrhea, wheezing or stridor.  Patient is a non-smoker.    REVIEW OF SYSTEMS     Review of Systems   Constitutional:  Positive for activity change, appetite change, chills and fever. Negative for fatigue.   HENT:  Positive for congestion and sore throat. Negative for ear pain, postnasal drip, rhinorrhea and trouble swallowing.    Eyes:  Negative for pain, discharge and redness.   Respiratory:  Positive for cough. Negative for shortness of breath.    Cardiovascular: Negative.  Negative for chest pain.   Gastrointestinal:  Negative for abdominal pain, constipation, diarrhea, nausea and vomiting.   Endocrine: Negative.    Genitourinary:  Negative for dysuria, frequency and urgency.   Musculoskeletal:  Positive for myalgias. Negative for arthralgias and back pain.   Skin:  Negative for rash.   Allergic/Immunologic: Negative.  Negative for environmental allergies.   Neurological:  Negative for dizziness, tremors, weakness and headaches.   Hematological: Negative.    Psychiatric/Behavioral:  Negative for dysphoric mood and sleep disturbance. The patient is not nervous/anxious.        PAST MEDICAL HISTORY         Diagnosis Date    Anxiety     Depression     Seizures (HCC)        SURGICAL HISTORY     Patient  has a past surgical history that includes Spine surgery; Wrist ganglion excision; cyst removal; tumor removal (1974); knee surgery; Splenectomy; and Capsule endoscopy.    CURRENT MEDICATIONS       Discharge Medication List as of 12/30/2023  8:53 AM        CONTINUE these medications which have NOT CHANGED    Details   albuterol sulfate HFA  Medications - No data to display  PROCEDURES:  None  FINAL IMPRESSION      1. Acute bronchitis, unspecified organism        DISPOSITION/PLAN   DISPOSITION Decision To Discharge 12/30/2023 08:51:18 AM    Rapid flu and COVID are negative.     PATIENT REFERRED TO:  Judith Suggs MD  [address] [place] [address]  [phone]      If symptoms worsen    DISCHARGE MEDICATIONS:  Discharge Medication List as of 12/30/2023  8:53 AM        START taking these medications    Details   guaiFENesin (MUCINEX) 600 MG extended release tablet Take 1 tablet by mouth 2 times daily for 10 days, Disp-20 tablet, R-0Normal      methylPREDNISolone (MEDROL DOSEPACK) 4 MG tablet Take by mouth., Disp-1 kit, R-0Normal      doxycycline hyclate (VIBRA-TABS) 100 MG tablet Take 1 tablet by mouth 2 times daily for 10 days, Disp-20 tablet, R-0Normal      promethazine-dextromethorphan (PROMETHAZINE-DM) 6.25-15 MG/5ML syrup Take 5 mLs by mouth 4 times daily as needed for Cough, Disp-118 mL, R-0Normal           Discharge Medication List as of 12/30/2023  8:53 AM          ÁNGELA Mckeon CNP, APRN - CNP  12/30/23 0854       ÁNGELA Mckeon - CNP  12/30/23 1129

## 2024-04-01 ENCOUNTER — HOSPITAL ENCOUNTER (OUTPATIENT)
Dept: WOMENS IMAGING | Age: 64
Discharge: HOME | End: 2024-04-01
Payer: MEDICARE

## 2024-04-01 DIAGNOSIS — Z12.31 VISIT FOR SCREENING MAMMOGRAM: ICD-10-CM

## 2024-04-01 PROCEDURE — 77063 BREAST TOMOSYNTHESIS BI: CPT

## 2024-04-11 ENCOUNTER — HOSPITAL ENCOUNTER (EMERGENCY)
Age: 64
Discharge: HOME | End: 2024-04-11
Payer: MEDICARE

## 2024-04-11 VITALS
RESPIRATION RATE: 18 BRPM | TEMPERATURE: 98.2 F | BODY MASS INDEX: 32.18 KG/M2 | OXYGEN SATURATION: 95 % | HEART RATE: 88 BPM | HEIGHT: 67 IN | DIASTOLIC BLOOD PRESSURE: 72 MMHG | WEIGHT: 205 LBS | SYSTOLIC BLOOD PRESSURE: 107 MMHG

## 2024-04-11 DIAGNOSIS — H10.31 ACUTE BACTERIAL CONJUNCTIVITIS OF RIGHT EYE: Primary | ICD-10-CM

## 2024-04-11 PROCEDURE — 99213 OFFICE O/P EST LOW 20 MIN: CPT

## 2024-04-11 RX ORDER — POLYMYXIN B SULFATE AND TRIMETHOPRIM 1; 10000 MG/ML; [USP'U]/ML
1 SOLUTION OPHTHALMIC EVERY 4 HOURS
Qty: 3 ML | Refills: 0 | Status: SHIPPED | OUTPATIENT
Start: 2024-04-11 | End: 2024-04-21

## 2024-04-11 ASSESSMENT — ENCOUNTER SYMPTOMS
EYE ITCHING: 1
EYE PAIN: 0
EYE REDNESS: 1
EYE DISCHARGE: 1
PHOTOPHOBIA: 0

## 2024-04-11 ASSESSMENT — PAIN - FUNCTIONAL ASSESSMENT: PAIN_FUNCTIONAL_ASSESSMENT: NONE - DENIES PAIN

## 2024-04-11 NOTE — ED PROVIDER NOTES
[facility] URGENT CARE  Urgent Care Encounter       CHIEF COMPLAINT       Chief Complaint   Patient presents with    Conjunctivitis     sandy       Nurses Notes reviewed and I agree except as noted in the HPI.  HISTORY OF PRESENT ILLNESS   Gale Kelsey is a 63 y.o. female who presents with concerns of bilateral eye itching and right eye drainage. Reports symptoms started this morning.     HPI    REVIEW OF SYSTEMS     Review of Systems   Constitutional:  Negative for fever.   Eyes:  Positive for discharge, redness and itching. Negative for photophobia, pain and visual disturbance.   All other systems reviewed and are negative.      PAST MEDICAL HISTORY         Diagnosis Date    Anxiety     Depression     Seizures (HCC)        SURGICALHISTORY     Patient  has a past surgical history that includes Spine surgery; Wrist ganglion excision; cyst removal; tumor removal (1974); knee surgery; Splenectomy; Capsule endoscopy; and Knee cartilage surgery (Left, 03/01/2024).    CURRENT MEDICATIONS       Previous Medications    ALBUTEROL SULFATE HFA (PROVENTIL HFA) 108 (90 BASE) MCG/ACT INHALER    Inhale 2 puffs into the lungs every 6 hours as needed for Wheezing    ASCORBIC ACID (VITAMIN C) 1000 MG TABLET    Take 1,000 mg by mouth 2 times daily    ASPIRIN EC 81 MG EC TABLET    Take 1 tablet by mouth daily    ATORVASTATIN (LIPITOR) 20 MG TABLET    Take 20 mg by mouth daily     CALCIUM CARBONATE (CALTRATE 600 PO)    Take by mouth daily     CHOLECALCIFEROL (VITAMIN D3) 50 MCG (2000 UT) CAPS    Take by mouth daily    CLONAZEPAM (KLONOPIN) 1 MG TABLET    Take 1 mg by mouth nightly as needed.      CYCLOBENZAPRINE (FLEXERIL) 10 MG TABLET    Take 1 tablet by mouth 3 times daily as needed for Muscle spasms .  Do not drive or operate heavy machinery while taking this medication.    FLUOXETINE HCL (PROZAC PO)    Take 60 mg by mouth daily     GLUCOSAMINE-CHONDROITIN PO    Take 1,000 mg by mouth daily as needed     LACOSAMIDE  in her father; Early Death in her father and mother; Heart Disease in her father and mother; High Blood Pressure in her father and mother; High Cholesterol in her father and mother; Kidney Disease in her father; Learning Disabilities in her father; Stroke in her paternal grandfather; Substance Abuse in her father.    SOCIAL HISTORY     Patient  reports that she has never smoked. She has never used smokeless tobacco. She reports that she does not drink alcohol and does not use drugs.    PHYSICAL EXAM     ED TRIAGE VITALS  BP: 107/72, Temp: 98.2 °F (36.8 °C), Pulse: 88, Respirations: 18, SpO2: 95 %,Estimated body mass index is 32.11 kg/m² as calculated from the following:    Height as of this encounter: 1.702 m (5' 7\").    Weight as of this encounter: 93 kg (205 lb).,No LMP recorded. Patient is postmenopausal.    Physical Exam  Vitals and nursing note reviewed.   Constitutional:       General: She is not in acute distress.     Appearance: Normal appearance. She is obese. She is not ill-appearing, toxic-appearing or diaphoretic.   Eyes:      General: Lids are normal.         Right eye: Discharge present.         Left eye: No discharge.      Extraocular Movements: Extraocular movements intact.      Conjunctiva/sclera:      Right eye: Right conjunctiva is injected.      Left eye: Left conjunctiva is not injected.      Pupils: Pupils are equal, round, and reactive to light.     Cardiovascular:      Rate and Rhythm: Normal rate and regular rhythm.      Heart sounds: Normal heart sounds.   Pulmonary:      Effort: Pulmonary effort is normal.      Breath sounds: Normal breath sounds.   Skin:     General: Skin is warm and dry.      Capillary Refill: Capillary refill takes less than 2 seconds.   Neurological:      General: No focal deficit present.      Mental Status: She is alert.   Psychiatric:         Mood and Affect: Mood normal.         Behavior: Behavior is cooperative.         DIAGNOSTIC RESULTS     Labs:No results found

## 2024-04-11 NOTE — ED NOTES
Pt with complaints of bilateral eye drainage and itching that started last night. States mainly in right eye but feels as if it is going into left. Denies any pain.     Benita Juarez LPN  04/11/24 3718

## 2024-04-11 NOTE — DISCHARGE INSTRUCTIONS
Eye drops as prescribed.  Warm compress, wipe from nasal bridge outward.  Increase water intake, frequent hand washing.  Tylenol / Ibuprofen as needed for fever and or pain.  Follow up with PCP in 3-5 days if no improvement or sooner with worsening symptoms.

## 2024-04-12 ENCOUNTER — HOSPITAL ENCOUNTER (EMERGENCY)
Age: 64
Discharge: HOME | End: 2024-04-12
Payer: MEDICARE

## 2024-04-12 VITALS
OXYGEN SATURATION: 94 % | HEART RATE: 99 BPM | BODY MASS INDEX: 32.18 KG/M2 | WEIGHT: 205 LBS | HEIGHT: 67 IN | SYSTOLIC BLOOD PRESSURE: 133 MMHG | DIASTOLIC BLOOD PRESSURE: 79 MMHG | TEMPERATURE: 97.6 F | RESPIRATION RATE: 18 BRPM

## 2024-04-12 DIAGNOSIS — L03.213 PRESEPTAL CELLULITIS OF RIGHT EYE: Primary | ICD-10-CM

## 2024-04-12 DIAGNOSIS — J06.9 ACUTE UPPER RESPIRATORY INFECTION: ICD-10-CM

## 2024-04-12 PROCEDURE — 99213 OFFICE O/P EST LOW 20 MIN: CPT

## 2024-04-12 PROCEDURE — 99213 OFFICE O/P EST LOW 20 MIN: CPT | Performed by: NURSE PRACTITIONER

## 2024-04-12 RX ORDER — AMOXICILLIN AND CLAVULANATE POTASSIUM 875; 125 MG/1; MG/1
1 TABLET, FILM COATED ORAL 2 TIMES DAILY
Qty: 14 TABLET | Refills: 0 | Status: SHIPPED | OUTPATIENT
Start: 2024-04-12 | End: 2024-04-19

## 2024-04-12 ASSESSMENT — ENCOUNTER SYMPTOMS
EYE DISCHARGE: 1
EYE PAIN: 1
COUGH: 1

## 2024-04-12 ASSESSMENT — PAIN - FUNCTIONAL ASSESSMENT: PAIN_FUNCTIONAL_ASSESSMENT: NONE - DENIES PAIN

## 2024-04-12 NOTE — ED TRIAGE NOTES
Patient was here yesterday for pink eye in the right eye.  The left eye is now infected as well.  She states she woke up with cough and chest congestion.

## 2024-04-12 NOTE — ED PROVIDER NOTES
[facility] URGENT CARE  UrgentCare Encounter      CHIEFCOMPLAINT       Chief Complaint   Patient presents with    Cough       Nurses Notes reviewed and I agree except as noted in the HPI.  HISTORY OF PRESENT ILLNESS   Gale Kelsey is a 63 y.o. female who presents to urgent care with complaints of productive cough with green sputum.  She was seen yesterday diagnosed with bacterial conjunctivitis of the right eye.  She says symptoms have now spread to her left eye.  She reports increased swelling to her upper and lower lid.  Patient reports little to no improvement with the eyedrops.    REVIEW OF SYSTEMS     Review of Systems   Eyes:  Positive for pain and discharge.   Respiratory:  Positive for cough.        PAST MEDICAL HISTORY         Diagnosis Date    Anxiety     Depression     Seizures (HCC)        SURGICAL HISTORY     Patient  has a past surgical history that includes Spine surgery; Wrist ganglion excision; cyst removal; tumor removal (1974); knee surgery; Splenectomy; Capsule endoscopy; and Knee cartilage surgery (Left, 03/01/2024).    CURRENT MEDICATIONS       Discharge Medication List as of 4/12/2024 11:44 AM        CONTINUE these medications which have NOT CHANGED    Details   trimethoprim-polymyxin b (POLYTRIM) 80131-7.1 UNIT/ML-% ophthalmic solution Place 1 drop into the right eye every 4 hours for 10 days, Disp-3 mL, R-0Normal      albuterol sulfate HFA (PROVENTIL HFA) 108 (90 Base) MCG/ACT inhaler Inhale 2 puffs into the lungs every 6 hours as needed for Wheezing, Disp-1 each, R-0Normal      cyclobenzaprine (FLEXERIL) 10 MG tablet Take 1 tablet by mouth 3 times daily as needed for Muscle spasms .  Do not drive or operate heavy machinery while taking this medication., Disp-15 tablet, R-0Normal      lidocaine (XYLOCAINE) 5 % ointment Apply topically as needed., Disp-50 g, R-0, Normal      lamoTRIgine (LAMICTAL) 100 MG tablet TAKE 2 & 1/2 (TWO & ONE-HALF) TABLETS BY MOUTH TWICE DAILY,

## 2024-07-18 ENCOUNTER — HOSPITAL ENCOUNTER (EMERGENCY)
Age: 64
Discharge: HOME | End: 2024-07-18
Attending: EMERGENCY MEDICINE
Payer: MEDICARE

## 2024-07-18 ENCOUNTER — APPOINTMENT (OUTPATIENT)
Dept: CT IMAGING | Age: 64
End: 2024-07-18
Payer: MEDICARE

## 2024-07-18 ENCOUNTER — APPOINTMENT (OUTPATIENT)
Dept: MRI IMAGING | Age: 64
End: 2024-07-18
Payer: MEDICARE

## 2024-07-18 VITALS
DIASTOLIC BLOOD PRESSURE: 72 MMHG | SYSTOLIC BLOOD PRESSURE: 134 MMHG | WEIGHT: 206 LBS | HEART RATE: 78 BPM | BODY MASS INDEX: 32.26 KG/M2 | OXYGEN SATURATION: 98 % | TEMPERATURE: 99 F | RESPIRATION RATE: 15 BRPM

## 2024-07-18 DIAGNOSIS — R42 DIZZINESS: Primary | ICD-10-CM

## 2024-07-18 LAB
ANION GAP SERPL CALC-SCNC: 15 MEQ/L (ref 8–16)
BASOPHILS ABSOLUTE: 0.1 THOU/MM3 (ref 0–0.1)
BASOPHILS NFR BLD AUTO: 0.7 %
BUN SERPL-MCNC: 25 MG/DL (ref 7–22)
CALCIUM SERPL-MCNC: 9.5 MG/DL (ref 8.5–10.5)
CHLORIDE SERPL-SCNC: 102 MEQ/L (ref 98–111)
CO2 SERPL-SCNC: 22 MEQ/L (ref 23–33)
CREAT SERPL-MCNC: 1.2 MG/DL (ref 0.4–1.2)
DEPRECATED RDW RBC AUTO: 51.6 FL (ref 35–45)
EKG ATRIAL RATE: 85 BPM
EKG ATRIAL RATE: 92 BPM
EKG P AXIS: 36 DEGREES
EKG P AXIS: 64 DEGREES
EKG P-R INTERVAL: 152 MS
EKG P-R INTERVAL: 168 MS
EKG Q-T INTERVAL: 340 MS
EKG Q-T INTERVAL: 376 MS
EKG QRS DURATION: 76 MS
EKG QRS DURATION: 76 MS
EKG QTC CALCULATION (BAZETT): 420 MS
EKG QTC CALCULATION (BAZETT): 447 MS
EKG R AXIS: -6 DEGREES
EKG R AXIS: 44 DEGREES
EKG T AXIS: 31 DEGREES
EKG T AXIS: 77 DEGREES
EKG VENTRICULAR RATE: 85 BPM
EKG VENTRICULAR RATE: 92 BPM
EOSINOPHIL NFR BLD AUTO: 3.5 %
EOSINOPHILS ABSOLUTE: 0.3 THOU/MM3 (ref 0–0.4)
ERYTHROCYTE [DISTWIDTH] IN BLOOD BY AUTOMATED COUNT: 15.6 % (ref 11.5–14.5)
GFR SERPL CREATININE-BSD FRML MDRD: 51 ML/MIN/1.73M2
GLUCOSE SERPL-MCNC: 116 MG/DL (ref 70–108)
HCT VFR BLD AUTO: 40.6 % (ref 37–47)
HGB BLD-MCNC: 13.2 GM/DL (ref 12–16)
IMM GRANULOCYTES # BLD AUTO: 0.01 THOU/MM3 (ref 0–0.07)
IMM GRANULOCYTES NFR BLD AUTO: 0.1 %
LYMPHOCYTES ABSOLUTE: 2.4 THOU/MM3 (ref 1–4.8)
LYMPHOCYTES NFR BLD AUTO: 33.1 %
MCH RBC QN AUTO: 29.7 PG (ref 26–33)
MCHC RBC AUTO-ENTMCNC: 32.5 GM/DL (ref 32.2–35.5)
MCV RBC AUTO: 91.2 FL (ref 81–99)
MONOCYTES ABSOLUTE: 0.8 THOU/MM3 (ref 0.4–1.3)
MONOCYTES NFR BLD AUTO: 11.7 %
NEUTROPHILS ABSOLUTE: 3.7 THOU/MM3 (ref 1.8–7.7)
NEUTROPHILS NFR BLD AUTO: 50.9 %
NRBC BLD AUTO-RTO: 0 /100 WBC
NT-PROBNP SERPL IA-MCNC: 50.7 PG/ML (ref 0–124)
OSMOLALITY SERPL CALC.SUM OF ELEC: 282.9 MOSMOL/KG (ref 275–300)
PLATELET # BLD AUTO: 326 THOU/MM3 (ref 130–400)
PMV BLD AUTO: 11.3 FL (ref 9.4–12.4)
POTASSIUM SERPL-SCNC: 4.3 MEQ/L (ref 3.5–5.2)
RBC # BLD AUTO: 4.45 MILL/MM3 (ref 4.2–5.4)
SCAN OF BLOOD SMEAR: NORMAL
SODIUM SERPL-SCNC: 139 MEQ/L (ref 135–145)
TROPONIN, HIGH SENSITIVITY: 7 NG/L (ref 0–12)
WBC # BLD AUTO: 7.2 THOU/MM3 (ref 4.8–10.8)

## 2024-07-18 PROCEDURE — 70450 CT HEAD/BRAIN W/O DYE: CPT

## 2024-07-18 PROCEDURE — 70498 CT ANGIOGRAPHY NECK: CPT

## 2024-07-18 PROCEDURE — 84484 ASSAY OF TROPONIN QUANT: CPT

## 2024-07-18 PROCEDURE — 83880 ASSAY OF NATRIURETIC PEPTIDE: CPT

## 2024-07-18 PROCEDURE — 80048 BASIC METABOLIC PNL TOTAL CA: CPT

## 2024-07-18 PROCEDURE — 93005 ELECTROCARDIOGRAM TRACING: CPT | Performed by: EMERGENCY MEDICINE

## 2024-07-18 PROCEDURE — 2580000003 HC RX 258

## 2024-07-18 PROCEDURE — 70551 MRI BRAIN STEM W/O DYE: CPT

## 2024-07-18 PROCEDURE — 6360000004 HC RX CONTRAST MEDICATION

## 2024-07-18 PROCEDURE — 99285 EMERGENCY DEPT VISIT HI MDM: CPT

## 2024-07-18 PROCEDURE — 36415 COLL VENOUS BLD VENIPUNCTURE: CPT

## 2024-07-18 PROCEDURE — 70496 CT ANGIOGRAPHY HEAD: CPT

## 2024-07-18 PROCEDURE — 85025 COMPLETE CBC W/AUTO DIFF WBC: CPT

## 2024-07-18 PROCEDURE — 93010 ELECTROCARDIOGRAM REPORT: CPT | Performed by: INTERNAL MEDICINE

## 2024-07-18 RX ORDER — 0.9 % SODIUM CHLORIDE 0.9 %
1000 INTRAVENOUS SOLUTION INTRAVENOUS ONCE
Status: COMPLETED | OUTPATIENT
Start: 2024-07-18 | End: 2024-07-18

## 2024-07-18 RX ADMIN — IOPAMIDOL 80 ML: 755 INJECTION, SOLUTION INTRAVENOUS at 19:07

## 2024-07-18 RX ADMIN — SODIUM CHLORIDE 1000 ML: 9 INJECTION, SOLUTION INTRAVENOUS at 18:12

## 2024-07-18 ASSESSMENT — PAIN - FUNCTIONAL ASSESSMENT: PAIN_FUNCTIONAL_ASSESSMENT: NONE - DENIES PAIN

## 2024-07-18 NOTE — ED NOTES
Bedside report received from Raymond GEORGES and Preeti RN. This nurse assuming care. Patient resting in bed. Respirations easy and unlabored. No distress noted. Call light within reach.

## 2024-07-18 NOTE — ED NOTES
Patient resting in bed. Respirations easy and unlabored. IV access established, fluids hung. Call light in reach. Lights turned down via patient request to promote rest.

## 2024-07-18 NOTE — ED PROVIDER NOTES
[facility] EMERGENCY DEPT  EMERGENCY DEPARTMENT ENCOUNTER          Pt Name: Gale Kelsey  MRN: [id number]  Birthdate 1960  Date of evaluation: 7/18/2024  Physician: Hero Partida MD  Supervising Attending Physician: Felipe Tom MD       CHIEF COMPLAINT       Chief Complaint   Patient presents with    Dizziness         HISTORY OF PRESENT ILLNESS    HPI  Gale Kelsey is a 63 y.o. female who presents to the emergency department from home, brought in by EMS for evaluation of dizziness    Patient with history of anxiety depression and seizures presents to the ED with complaints of dizziness that started while she was at work at 4 PM which was 2 hours prior to presentation described as the room spinning and feeling unsteady.  She had 1 episode like this last week that was self-limited after 3 hours.  She presented for further evaluations due to concerns of the recurrence.  She denied any recent illnesses, she denied any weakness or numbness in any of her extremities she denied any visual field defects or blurring, she denied any ear pain or tinnitus.  The patient has no other acute complaints at this time.      REVIEW OF SYSTEMS   Review of Systems      PAST MEDICAL AND SURGICAL HISTORY     Past Medical History:   Diagnosis Date    Anxiety     Depression     Seizures (HCC)      Past Surgical History:   Procedure Laterality Date    CAPSULE ENDOSCOPY      CYST REMOVAL      right ribcage    KNEE CARTILAGE SURGERY Left 03/01/2024    KNEE SURGERY      SPINE SURGERY      SPLENECTOMY      TUMOR REMOVAL  1974    stomach    WRIST GANGLION EXCISION      right wrist         MEDICATIONS   No current facility-administered medications for this encounter.    Current Outpatient Medications:     albuterol sulfate HFA (PROVENTIL HFA) 108 (90 Base) MCG/ACT inhaler, Inhale 2 puffs into the lungs every 6 hours as needed for Wheezing, Disp: 1 each, Rfl: 0    cyclobenzaprine (FLEXERIL) 10 MG tablet, Take 1  came back negative.  Had a discussion with the patient and explained to her that although initially we considered her to have a stroke MRI came back to exclude that possibility.  I also explained to patient that the rest of her workup including EKG and lab work did not reveal any acute findings.  She was treated with a liter of IV fluid.  She was able to ambulate without difficulty in the ED.  We agreed that she will need to follow-up with neurology outpatient to further assess her dizziness and to use meclizine which she has at home as needed for her symptoms.  She expressed understanding and agreement with the treatment plan from discharge.    ED COURSE   ED Medications administered this visit (None if left blank):   Medications   sodium chloride 0.9 % bolus 1,000 mL (1,000 mLs IntraVENous New Bag 7/18/24 1812)   iopamidol (ISOVUE-370) 76 % injection 80 mL (80 mLs IntraVENous Given 7/18/24 1907)         ED Course as of 07/18/24 2136   Thu Jul 18, 2024 1944 Stroke alert activated [DT]      ED Course User Index  [DT] Felipe Tom MD         PROCEDURES: (None if blank)  Procedures:     CRITICAL CARE:  None    MEDICATION CHANGES     New Prescriptions    No medications on file         FINAL DISPOSITION   Shared Decision-Making was performed, disposition discussed with the patient/family and questions answered.      Outpatient follow up (If applicable):  Sejal Monroy MD  [address] [address]  [phone]    Call       Not applicable              FINAL DIAGNOSES:  Final diagnoses:   Dizziness       Condition: condition: stable  Dispo: Discharge to home  DISPOSITION Decision To Discharge 07/18/2024 09:34:58 PM      This transcription was electronically signed. It was dictated by use of voice recognition software and electronically transcribed. The transcription may contain errors not detected in proofreading.    Electronically signed by Hero Partida MD on 7/18/24 at 6:04 PM EDT

## 2024-07-18 NOTE — ED TRIAGE NOTES
Presents to ER with concern of sudden dizziness. Reports she was at work when dizziness started 2 hours ago. She reports no CP, or SOB. She reports everything is spinning around her. Pt hypotensive. Alexei garcia in ED

## 2024-07-19 NOTE — ED PROVIDER NOTES
[facility]  EMERGENCY MEDICINE ATTENDING ATTESTATION      Evaluation of Gale Kelsey.   Case discussed and care plan developed with resident physician.   I agree with the resident physician documentation and plan as documented by him, except if my documentation differs.   Patient seen, interviewed and examined by me.  I reviewed the medical, surgical, family and social history, medications and allergies.   I have reviewed and interpreted all available lab, radiology and ekg results available at the moment.  I have reviewed the nursing documentation.       Brief H&P   Patient c/o sudden onset lightheadedness followed by unsteady sensation that subsided, while at work earlier today.  Patient states she had a normal amount of liquids today and denies having been feeling other symptoms before this new symptoms started.  She feels back to normal at the moment of my evaluation.  Patient denies recent URIs.    Physical exam is notable for well appearing, no motor deficit, no facial asymmetry, no dysmetria, no nystagmus, no truncal ataxia but patient does feel a little dizzy when incorporating from recumbent position.  Positive Romberg.      Medical Decision Making   MDM:   Undifferentiated dizziness  Possible BPPV/labyrinthitis  Less likely but will consider stroke within the differential, NIHSS 0.  Plan:   IV line, labs  IV fluids  Imaging  EKG  Observation in the ED while awaiting results    Please see the resident physician completed note for final disposition except as documented on this attestation.   I have reviewed and interpreted all available lab, radiology and ekg results available at the moment.  Diagnosis, treatment and disposition plans were discussed and agreed upon by patient.   This transcription was electronically signed. It was dictated by use of voice recognition software and electronically transcribed. The transcription may contain errors not detected in

## 2024-07-19 NOTE — DISCHARGE INSTRUCTIONS
Take meclizine as needed for dizziness.    Follow-up with neurology for further assessment and management of your dizziness.    Return to the emergency department immediately if there is any new or concerning symptom.

## 2024-07-24 ENCOUNTER — APPOINTMENT (OUTPATIENT)
Dept: CT IMAGING | Age: 64
End: 2024-07-24
Payer: MEDICARE

## 2024-07-24 ENCOUNTER — APPOINTMENT (OUTPATIENT)
Dept: GENERAL RADIOLOGY | Age: 64
End: 2024-07-24
Payer: MEDICARE

## 2024-07-24 ENCOUNTER — HOSPITAL ENCOUNTER (EMERGENCY)
Age: 64
Discharge: HOME | End: 2024-07-24
Payer: MEDICARE

## 2024-07-24 VITALS
TEMPERATURE: 98 F | DIASTOLIC BLOOD PRESSURE: 67 MMHG | OXYGEN SATURATION: 100 % | RESPIRATION RATE: 15 BRPM | SYSTOLIC BLOOD PRESSURE: 93 MMHG | HEART RATE: 79 BPM

## 2024-07-24 DIAGNOSIS — S09.90XA CLOSED HEAD INJURY, INITIAL ENCOUNTER: Primary | ICD-10-CM

## 2024-07-24 DIAGNOSIS — S01.81XA FACIAL LACERATION, INITIAL ENCOUNTER: ICD-10-CM

## 2024-07-24 DIAGNOSIS — S80.02XA CONTUSION OF LEFT KNEE, INITIAL ENCOUNTER: ICD-10-CM

## 2024-07-24 DIAGNOSIS — S46.912A LEFT SHOULDER STRAIN, INITIAL ENCOUNTER: ICD-10-CM

## 2024-07-24 DIAGNOSIS — S73.102A HIP SPRAIN, LEFT, INITIAL ENCOUNTER: ICD-10-CM

## 2024-07-24 PROCEDURE — 99284 EMERGENCY DEPT VISIT MOD MDM: CPT

## 2024-07-24 PROCEDURE — 6370000000 HC RX 637 (ALT 250 FOR IP): Performed by: PHYSICIAN ASSISTANT

## 2024-07-24 PROCEDURE — 70450 CT HEAD/BRAIN W/O DYE: CPT

## 2024-07-24 PROCEDURE — 90715 TDAP VACCINE 7 YRS/> IM: CPT | Performed by: PHYSICIAN ASSISTANT

## 2024-07-24 PROCEDURE — 6360000002 HC RX W HCPCS: Performed by: PHYSICIAN ASSISTANT

## 2024-07-24 PROCEDURE — 73564 X-RAY EXAM KNEE 4 OR MORE: CPT

## 2024-07-24 PROCEDURE — 12011 RPR F/E/E/N/L/M 2.5 CM/<: CPT

## 2024-07-24 PROCEDURE — 73502 X-RAY EXAM HIP UNI 2-3 VIEWS: CPT

## 2024-07-24 PROCEDURE — 72125 CT NECK SPINE W/O DYE: CPT

## 2024-07-24 PROCEDURE — 90471 IMMUNIZATION ADMIN: CPT | Performed by: PHYSICIAN ASSISTANT

## 2024-07-24 PROCEDURE — 73030 X-RAY EXAM OF SHOULDER: CPT

## 2024-07-24 RX ORDER — ACETAMINOPHEN 500 MG
1000 TABLET ORAL ONCE
Status: COMPLETED | OUTPATIENT
Start: 2024-07-24 | End: 2024-07-24

## 2024-07-24 RX ADMIN — ACETAMINOPHEN 1000 MG: 500 TABLET ORAL at 12:17

## 2024-07-24 RX ADMIN — TETANUS TOXOID, REDUCED DIPHTHERIA TOXOID AND ACELLULAR PERTUSSIS VACCINE, ADSORBED 0.5 ML: 5; 2.5; 8; 8; 2.5 SUSPENSION INTRAMUSCULAR at 12:35

## 2024-07-24 NOTE — ED TRIAGE NOTES
Pt arrives to ED from home with c/o fall, hip and shoulder pain  Pt states she was chasing a dog and fell down a hill, went over her feet, falling onto the cement onto her left side. She reports pain in left shoulder and hip  Small lac noted to her left forehead, bleeding is controlled, pt reports daily ASA

## 2024-07-24 NOTE — ED PROVIDER NOTES
[facility] EMERGENCY DEPT      EMERGENCY MEDICINE     Pt Name: Gale Kelsey  MRN: [id number]  Birthdate 1960  Date of evaluation: 7/24/2024  Provider: RADHA Dyson    CHIEF COMPLAINT       Chief Complaint   Patient presents with    Fall    Hip Pain    Shoulder Injury     HISTORY OF PRESENT ILLNESS   Gale Kelsey is a pleasant 63 y.o. female who presents to the emergency department from home.  Patient was chasing her neighbors dog down an incline and ended up tripping and falling onto a driveway.  Patient did hit the left side of her head.  Patient dazed initially but no definitive no loss of consciousness.  Significant headache since the injury..  No vision or hearing changes.  No nausea or vomiting.  Denying any neck pain.  Patient is complaining of left shoulder pain, left knee pain and left hip pain.  Has not ambulated since this happened.  Brought in by EMS.  Denying chest pain or shortness of breath.  No dizziness or vertigo.  No numbness or tingling.  Patient not on blood thinners--other than the aspirin.  PASTMEDICAL HISTORY     Past Medical History:   Diagnosis Date    Anxiety     Depression     Seizures (HCC)        Patient Active Problem List   Diagnosis Code    Chest pain, atypical R07.89    Pure hypercholesterolemia E78.00    Family history of premature CAD Z82.49    History of chest pain Z87.898    GERD (gastroesophageal reflux disease) K21.9     SURGICAL HISTORY       Past Surgical History:   Procedure Laterality Date    CAPSULE ENDOSCOPY      CYST REMOVAL      right ribcage    KNEE CARTILAGE SURGERY Left 03/01/2024    KNEE SURGERY      SPINE SURGERY      SPLENECTOMY      TUMOR REMOVAL  1974    stomach    WRIST GANGLION EXCISION      right wrist       CURRENT MEDICATIONS       Previous Medications    ALBUTEROL SULFATE HFA (PROVENTIL HFA) 108 (90 BASE) MCG/ACT INHALER    Inhale 2 puffs into the lungs every 6 hours as needed for Wheezing    ASCORBIC ACID (VITAMIN C) 1000 MG TABLET

## 2024-07-24 NOTE — DISCHARGE INSTRUCTIONS
Use over-the-counter acetaminophen as directed on the bottle for pain control.  Apply ice to the sore areas 20 minutes on and off.  Make sure somebody stays with you at all times over the next 24 hours.  Reviewed the head injury instructions.    Your tetanus was updated here in the form of Tdap.    Review the tissue adhesive instructions.  Return if any signs or symptoms of infection develop or any other problems arise.    Discharge warning    Please remember that examination and testing performed in the emergency department is not a comprehensive evaluation of all medical conditions and does not replace the need to follow up with your primary care provider.  In the emergency department, we are only able to evaluate your symptoms in the current condition, but symptoms may change or worsen.  Although you are felt safe to be discharged today, if your symptoms persist or change, you need to be re-evaluated by your regular/primary care doctor as soon as possible.  If you are unable to make appointment with your regular doctor, please come back to the ER to be re-evaluated.

## 2024-07-26 ENCOUNTER — HOSPITAL ENCOUNTER (EMERGENCY)
Age: 64
Discharge: HOME | End: 2024-07-26
Attending: EMERGENCY MEDICINE
Payer: MEDICARE

## 2024-07-26 ENCOUNTER — APPOINTMENT (OUTPATIENT)
Dept: MRI IMAGING | Age: 64
End: 2024-07-26
Payer: MEDICARE

## 2024-07-26 VITALS
SYSTOLIC BLOOD PRESSURE: 134 MMHG | OXYGEN SATURATION: 94 % | WEIGHT: 205 LBS | HEART RATE: 62 BPM | TEMPERATURE: 98 F | RESPIRATION RATE: 16 BRPM | BODY MASS INDEX: 32.18 KG/M2 | HEIGHT: 67 IN | DIASTOLIC BLOOD PRESSURE: 79 MMHG

## 2024-07-26 DIAGNOSIS — F51.3 SLEEP WALKING: Primary | ICD-10-CM

## 2024-07-26 LAB
ANION GAP SERPL CALC-SCNC: 11 MEQ/L (ref 8–16)
BACTERIA: ABNORMAL
BASOPHILS ABSOLUTE: 0.1 THOU/MM3 (ref 0–0.1)
BASOPHILS NFR BLD AUTO: 0.8 %
BILIRUB UR QL STRIP: NEGATIVE
BUN SERPL-MCNC: 16 MG/DL (ref 7–22)
CALCIUM SERPL-MCNC: 9.5 MG/DL (ref 8.5–10.5)
CASTS #/AREA URNS LPF: ABNORMAL /LPF
CASTS #/AREA URNS LPF: ABNORMAL /LPF
CHARACTER UR: CLEAR
CHARCOAL URNS QL MICRO: ABNORMAL
CHLORIDE SERPL-SCNC: 106 MEQ/L (ref 98–111)
CO2 SERPL-SCNC: 25 MEQ/L (ref 23–33)
COLOR UR: YELLOW
CREAT SERPL-MCNC: 0.8 MG/DL (ref 0.4–1.2)
CRYSTALS URNS QL MICRO: ABNORMAL
DEPRECATED RDW RBC AUTO: 53.3 FL (ref 35–45)
EKG ATRIAL RATE: 66 BPM
EKG P AXIS: 64 DEGREES
EKG P-R INTERVAL: 178 MS
EKG Q-T INTERVAL: 410 MS
EKG QRS DURATION: 72 MS
EKG QTC CALCULATION (BAZETT): 429 MS
EKG R AXIS: 43 DEGREES
EKG T AXIS: 66 DEGREES
EKG VENTRICULAR RATE: 66 BPM
EOSINOPHIL NFR BLD AUTO: 3.9 %
EOSINOPHILS ABSOLUTE: 0.2 THOU/MM3 (ref 0–0.4)
EPITHELIAL CELLS, UA: ABNORMAL /HPF
ERYTHROCYTE [DISTWIDTH] IN BLOOD BY AUTOMATED COUNT: 16.1 % (ref 11.5–14.5)
GFR SERPL CREATININE-BSD FRML MDRD: 83 ML/MIN/1.73M2
GLUCOSE SERPL-MCNC: 99 MG/DL (ref 70–108)
GLUCOSE UR QL STRIP.AUTO: NEGATIVE MG/DL
HCT VFR BLD AUTO: 41.2 % (ref 37–47)
HGB BLD-MCNC: 13.4 GM/DL (ref 12–16)
HGB UR QL STRIP.AUTO: NEGATIVE
IMM GRANULOCYTES # BLD AUTO: 0.01 THOU/MM3 (ref 0–0.07)
IMM GRANULOCYTES NFR BLD AUTO: 0.2 %
KETONES UR QL STRIP.AUTO: ABNORMAL
LACTATE SERPL-SCNC: 0.6 MMOL/L (ref 0.5–2)
LEUKOCYTE ESTERASE UR QL STRIP.AUTO: ABNORMAL
LYMPHOCYTES ABSOLUTE: 2.1 THOU/MM3 (ref 1–4.8)
LYMPHOCYTES NFR BLD AUTO: 33.9 %
MCH RBC QN AUTO: 29.6 PG (ref 26–33)
MCHC RBC AUTO-ENTMCNC: 32.5 GM/DL (ref 32.2–35.5)
MCV RBC AUTO: 90.9 FL (ref 81–99)
MONOCYTES ABSOLUTE: 0.8 THOU/MM3 (ref 0.4–1.3)
MONOCYTES NFR BLD AUTO: 13.1 %
NEUTROPHILS ABSOLUTE: 3 THOU/MM3 (ref 1.8–7.7)
NEUTROPHILS NFR BLD AUTO: 48.1 %
NITRITE UR QL STRIP.AUTO: NEGATIVE
NRBC BLD AUTO-RTO: 0 /100 WBC
OSMOLALITY SERPL CALC.SUM OF ELEC: 284.3 MOSMOL/KG (ref 275–300)
PH UR STRIP.AUTO: 5.5 [PH] (ref 5–9)
PLATELET # BLD AUTO: 397 THOU/MM3 (ref 130–400)
PMV BLD AUTO: 10.4 FL (ref 9.4–12.4)
POTASSIUM SERPL-SCNC: 4.3 MEQ/L (ref 3.5–5.2)
PROLACTIN SERPL-MCNC: 11.6 NG/ML
PROT UR STRIP.AUTO-MCNC: NEGATIVE MG/DL
RBC # BLD AUTO: 4.53 MILL/MM3 (ref 4.2–5.4)
RBC #/AREA URNS HPF: ABNORMAL /HPF
RENAL EPI CELLS #/AREA URNS HPF: ABNORMAL /[HPF]
SODIUM SERPL-SCNC: 142 MEQ/L (ref 135–145)
SPECIFIC GRAVITY UA: 1.02 (ref 1–1.03)
UROBILINOGEN, URINE: 0.2 EU/DL (ref 0–1)
WBC # BLD AUTO: 6.3 THOU/MM3 (ref 4.8–10.8)
WBC #/AREA URNS HPF: ABNORMAL /HPF
YEAST LIKE FUNGI URNS QL MICRO: ABNORMAL

## 2024-07-26 PROCEDURE — 93005 ELECTROCARDIOGRAM TRACING: CPT

## 2024-07-26 PROCEDURE — 80048 BASIC METABOLIC PNL TOTAL CA: CPT

## 2024-07-26 PROCEDURE — 83605 ASSAY OF LACTIC ACID: CPT

## 2024-07-26 PROCEDURE — 99284 EMERGENCY DEPT VISIT MOD MDM: CPT

## 2024-07-26 PROCEDURE — 85025 COMPLETE CBC W/AUTO DIFF WBC: CPT

## 2024-07-26 PROCEDURE — 70551 MRI BRAIN STEM W/O DYE: CPT

## 2024-07-26 PROCEDURE — 84146 ASSAY OF PROLACTIN: CPT

## 2024-07-26 PROCEDURE — 81001 URINALYSIS AUTO W/SCOPE: CPT

## 2024-07-26 PROCEDURE — 36415 COLL VENOUS BLD VENIPUNCTURE: CPT

## 2024-07-26 PROCEDURE — 6370000000 HC RX 637 (ALT 250 FOR IP)

## 2024-07-26 RX ORDER — MECLIZINE HYDROCHLORIDE 25 MG/1
25 TABLET ORAL 3 TIMES DAILY PRN
Qty: 15 TABLET | Refills: 0 | Status: SHIPPED | OUTPATIENT
Start: 2024-07-26 | End: 2024-08-05

## 2024-07-26 RX ORDER — MECLIZINE HCL 25MG 25 MG/1
25 TABLET, CHEWABLE ORAL ONCE
Status: COMPLETED | OUTPATIENT
Start: 2024-07-26 | End: 2024-07-26

## 2024-07-26 RX ORDER — ACETAMINOPHEN 500 MG
1000 TABLET ORAL ONCE
Status: COMPLETED | OUTPATIENT
Start: 2024-07-26 | End: 2024-07-26

## 2024-07-26 RX ADMIN — ACETAMINOPHEN 1000 MG: 500 TABLET ORAL at 10:42

## 2024-07-26 RX ADMIN — MECLIZINE HYDROCHLORIDE 25 MG: 25 TABLET, CHEWABLE ORAL at 14:12

## 2024-07-26 ASSESSMENT — ENCOUNTER SYMPTOMS
SORE THROAT: 0
EYES NEGATIVE: 1
RESPIRATORY NEGATIVE: 1
CONSTIPATION: 1
VOICE CHANGE: 0
TROUBLE SWALLOWING: 0
PHOTOPHOBIA: 0

## 2024-07-26 NOTE — ED TRIAGE NOTES
Pt presents to ED via lobby for evaluation of ear pain, headache, and nausea. Pt states she had a fall Wednesday and was brought in for evaluation. Pt was discharged. Pt states she is having pain both in the front and back of her head. Current pain rated 8/10. Denies pain medications prior to arrival. Pt also states some concerns that she may have been sleep walking, which would be new for her. Denies CP or SOB. Vitals obtained.

## 2024-07-26 NOTE — ED NOTES
Pt resting in bed. Pt updated on POC. Pt denies further needs at this time. Vitals collected. Pt breathing easy and unlabored. Call light in reach.

## 2024-07-26 NOTE — ED NOTES
Pt resting in bed. Pt updated on POC. Pt denies further needs at this time. Pt breathing easy and unlabored. Vitals collected. Call light in reach.

## 2024-07-26 NOTE — ED PROVIDER NOTES
[facility] EMERGENCY DEPT  EMERGENCY DEPARTMENT ENCOUNTER          Pt Name: Gale Kelsey  MRN: [id number]  Birthdate 1960  Date of evaluation: 7/26/2024  Physician: Viktoria Atwood MD  Supervising Attending Physician: Maximino Mejias MD       CHIEF COMPLAINT       Chief Complaint   Patient presents with    Headache    Nausea    Otalgia         HISTORY OF PRESENT ILLNESS    HPI  Gale Kelsey is a 63 y.o. female who presents to the emergency department from home, as a walk in to the ED lobby for evaluation of headache and possible sleep walking. Patient woke up this morning to find herself naked in her living room with everything outside of her refrigerator thrown onto the floor, eggs cracked into a bowl next to her cat, broken glass all around, patient has no recollection of any of this.  Patient went to bed last night at 10 PM in her bed clothed.  Patient said this is the first time this is ever happened.  Patient had a fall 2 days ago in which she hit her head and came into the ED. She has had a headache since the fall that has been getting better since. Patient has been taking tylenol for the headache and it does not help her too much. The pain is throbbing and non radiating. Patient did not have any weakness, numbness, LOC, vision or auditory changes, or hallucinations.   Patient also complains of right sided ear pain that radiates to her neck that she has had for a few weeks. Patient did not have any urinary or bowel incontinence during this episode.  Patient remembered that she had a similar episode during Christmas 2023 where she was taken to the hospital by her brother, seen and treated. When she got back to [place] and received the bill she did not remember going or being treated at the hospital.   The patient has no other acute complaints at this time.      REVIEW OF SYSTEMS   Review of Systems   Constitutional: Negative.    HENT:  Positive for ear pain. Negative for hearing 
[address]  [place] [address] [phone]    Call   Please follow up with PCP for reevaluation    Michael Brand MD  [address]  [facility] [address]  [facility] [address] [phone]    Call   Please follow up with Neurologist for reevaluation    DISCHARGE MEDICATIONS:  New Prescriptions    MECLIZINE (ANTIVERT) 25 MG TABLET    Take 1 tablet by mouth 3 times daily as needed for Nausea or Dizziness (Use one as needed every)       (Please note that portions of this note were completed with a voice recognition program.  Efforts were made to edit the dictations but occasionally words aremis-transcribed.)    MD Magalie Blackwell Jian, MD  07/26/24 9317

## 2024-07-26 NOTE — ED NOTES
Pt ambulated steadily by own power to and from bathroom. Lab at bedside at this time. Pt denies further needs at this time. Vitals collected. Pt breathing easy and unlabored. Call light in reach.

## 2024-07-26 NOTE — ED NOTES
MRI screening and Orthostatic blood pressure and pulse completed at this time. Pt denies further needs at this time. Vitals collected. Pt breathing easy and unlabored. Call light in reach.

## 2024-07-26 NOTE — ED NOTES
Pt resting in bed. Pt resting Tylenol for headache. Dr. Blum notified. Pt denies further needs at this time. Pt breathing easy and unlabored at this time. Vitals collected. Call light in reach.

## 2024-07-26 NOTE — ED NOTES
Pt resting in bed. Pt denies any needs at this time. Vitals collected. Pt breathing easy and unlabored. Call light in reach.

## 2024-07-26 NOTE — ED NOTES
Pt ambulated from room to main door into ER per order. Pt states that she feels stable. Pt states that if she does not move side to side a lot that she feels good. Pt ambulated by own power and did not need assistance. Dr. Atwood notified at this time.

## 2024-12-28 ENCOUNTER — HOSPITAL ENCOUNTER (EMERGENCY)
Age: 64
Discharge: HOME | End: 2024-12-28
Payer: MEDICARE

## 2024-12-28 VITALS
HEART RATE: 76 BPM | RESPIRATION RATE: 18 BRPM | SYSTOLIC BLOOD PRESSURE: 107 MMHG | BODY MASS INDEX: 32.42 KG/M2 | TEMPERATURE: 98.7 F | OXYGEN SATURATION: 96 % | DIASTOLIC BLOOD PRESSURE: 72 MMHG | WEIGHT: 207 LBS

## 2024-12-28 DIAGNOSIS — J32.9 SINOBRONCHITIS: Primary | ICD-10-CM

## 2024-12-28 DIAGNOSIS — J40 SINOBRONCHITIS: Primary | ICD-10-CM

## 2024-12-28 LAB — SARS-COV-2 RDRP RESP QL NAA+PROBE: NOT  DETECTED

## 2024-12-28 PROCEDURE — 99213 OFFICE O/P EST LOW 20 MIN: CPT

## 2024-12-28 PROCEDURE — 87635 SARS-COV-2 COVID-19 AMP PRB: CPT

## 2024-12-28 PROCEDURE — 99213 OFFICE O/P EST LOW 20 MIN: CPT | Performed by: NURSE PRACTITIONER

## 2024-12-28 RX ORDER — PREDNISONE 20 MG/1
20 TABLET ORAL 2 TIMES DAILY
Qty: 10 TABLET | Refills: 0 | Status: SHIPPED | OUTPATIENT
Start: 2024-12-28 | End: 2025-01-02

## 2024-12-28 RX ORDER — DOXYCYCLINE HYCLATE 100 MG
100 TABLET ORAL 2 TIMES DAILY
Qty: 14 TABLET | Refills: 0 | Status: SHIPPED | OUTPATIENT
Start: 2024-12-28 | End: 2025-01-04

## 2024-12-28 ASSESSMENT — PAIN SCALES - GENERAL: PAINLEVEL_OUTOF10: 7

## 2024-12-28 ASSESSMENT — ENCOUNTER SYMPTOMS
COUGH: 1
SORE THROAT: 0
EYES NEGATIVE: 1

## 2024-12-28 ASSESSMENT — PAIN - FUNCTIONAL ASSESSMENT
PAIN_FUNCTIONAL_ASSESSMENT: PREVENTS OR INTERFERES SOME ACTIVE ACTIVITIES AND ADLS
PAIN_FUNCTIONAL_ASSESSMENT: 0-10

## 2024-12-28 ASSESSMENT — PAIN DESCRIPTION - LOCATION: LOCATION: CHEST

## 2024-12-28 ASSESSMENT — PAIN DESCRIPTION - FREQUENCY: FREQUENCY: CONTINUOUS

## 2024-12-28 ASSESSMENT — PAIN DESCRIPTION - PAIN TYPE: TYPE: ACUTE PAIN

## 2024-12-28 NOTE — ED TRIAGE NOTES
Gale arrives to room with complaint of  cough, chest tightness started last night.  Exposed to COVID on Sunday.       Not taking meds for symptoms.

## 2024-12-28 NOTE — DISCHARGE INSTRUCTIONS
Your COVID test is negative.    Medications as prescribed.    Follow-up with primary care provider as needed.

## 2025-01-04 ENCOUNTER — HOSPITAL ENCOUNTER (EMERGENCY)
Age: 65
Discharge: HOME | End: 2025-01-04
Attending: EMERGENCY MEDICINE
Payer: MEDICARE

## 2025-01-04 ENCOUNTER — APPOINTMENT (OUTPATIENT)
Dept: GENERAL RADIOLOGY | Age: 65
End: 2025-01-04
Payer: MEDICARE

## 2025-01-04 VITALS
HEART RATE: 76 BPM | OXYGEN SATURATION: 98 % | RESPIRATION RATE: 16 BRPM | SYSTOLIC BLOOD PRESSURE: 119 MMHG | DIASTOLIC BLOOD PRESSURE: 67 MMHG | TEMPERATURE: 98.7 F

## 2025-01-04 DIAGNOSIS — R07.9 CHEST PAIN, UNSPECIFIED TYPE: Primary | ICD-10-CM

## 2025-01-04 LAB
ALBUMIN SERPL BCG-MCNC: 4.2 G/DL (ref 3.5–5.1)
ALP SERPL-CCNC: 83 U/L (ref 38–126)
ALT SERPL W/O P-5'-P-CCNC: 13 U/L (ref 11–66)
ANION GAP SERPL CALC-SCNC: 13 MEQ/L (ref 8–16)
APTT PPP: 24.8 SECONDS (ref 22–38)
AST SERPL-CCNC: 14 U/L (ref 5–40)
BASOPHILS ABSOLUTE: 0 THOU/MM3 (ref 0–0.1)
BASOPHILS NFR BLD AUTO: 0.1 %
BILIRUB SERPL-MCNC: 0.5 MG/DL (ref 0.3–1.2)
BUN SERPL-MCNC: 15 MG/DL (ref 7–22)
CALCIUM SERPL-MCNC: 9.5 MG/DL (ref 8.5–10.5)
CHLORIDE SERPL-SCNC: 99 MEQ/L (ref 98–111)
CO2 SERPL-SCNC: 27 MEQ/L (ref 23–33)
CREAT SERPL-MCNC: 0.8 MG/DL (ref 0.4–1.2)
D DIMER PPP IA.FEU-MCNC: 473 NG/ML FEU (ref 0–500)
DEPRECATED RDW RBC AUTO: 55.9 FL (ref 35–45)
EKG ATRIAL RATE: 74 BPM
EKG ATRIAL RATE: 95 BPM
EKG P AXIS: 30 DEGREES
EKG P AXIS: 64 DEGREES
EKG P-R INTERVAL: 118 MS
EKG P-R INTERVAL: 156 MS
EKG Q-T INTERVAL: 362 MS
EKG Q-T INTERVAL: 404 MS
EKG QRS DURATION: 74 MS
EKG QRS DURATION: 78 MS
EKG QTC CALCULATION (BAZETT): 448 MS
EKG QTC CALCULATION (BAZETT): 454 MS
EKG R AXIS: 13 DEGREES
EKG R AXIS: 3 DEGREES
EKG T AXIS: 21 DEGREES
EKG T AXIS: 26 DEGREES
EKG VENTRICULAR RATE: 74 BPM
EKG VENTRICULAR RATE: 95 BPM
EOSINOPHIL NFR BLD AUTO: 0.4 %
EOSINOPHILS ABSOLUTE: 0.1 THOU/MM3 (ref 0–0.4)
ERYTHROCYTE [DISTWIDTH] IN BLOOD BY AUTOMATED COUNT: 17.2 % (ref 11.5–14.5)
GFR SERPL CREATININE-BSD FRML MDRD: 83 ML/MIN/1.73M2
GLUCOSE SERPL-MCNC: 83 MG/DL (ref 70–108)
HCT VFR BLD AUTO: 42.1 % (ref 37–47)
HGB BLD-MCNC: 13.7 GM/DL (ref 12–16)
IMM GRANULOCYTES # BLD AUTO: 0.04 THOU/MM3 (ref 0–0.07)
IMM GRANULOCYTES NFR BLD AUTO: 0.3 %
INR PPP: 0.97 (ref 0.85–1.13)
LYMPHOCYTES ABSOLUTE: 4.9 THOU/MM3 (ref 1–4.8)
LYMPHOCYTES NFR BLD AUTO: 35.5 %
MAGNESIUM SERPL-MCNC: 2.1 MG/DL (ref 1.6–2.4)
MCH RBC QN AUTO: 29 PG (ref 26–33)
MCHC RBC AUTO-ENTMCNC: 32.5 GM/DL (ref 32.2–35.5)
MCV RBC AUTO: 89 FL (ref 81–99)
MONOCYTES ABSOLUTE: 1.3 THOU/MM3 (ref 0.4–1.3)
MONOCYTES NFR BLD AUTO: 9.6 %
NEUTROPHILS ABSOLUTE: 7.5 THOU/MM3 (ref 1.8–7.7)
NEUTROPHILS NFR BLD AUTO: 54.1 %
NRBC BLD AUTO-RTO: 0 /100 WBC
NT-PROBNP SERPL IA-MCNC: 73.1 PG/ML (ref 0–124)
OSMOLALITY SERPL CALC.SUM OF ELEC: 277.5 MOSMOL/KG (ref 275–300)
PLATELET # BLD AUTO: 477 THOU/MM3 (ref 130–400)
PMV BLD AUTO: 10.5 FL (ref 9.4–12.4)
POTASSIUM SERPL-SCNC: 3.4 MEQ/L (ref 3.5–5.2)
PROT SERPL-MCNC: 6.8 G/DL (ref 6.1–8)
RBC # BLD AUTO: 4.73 MILL/MM3 (ref 4.2–5.4)
SODIUM SERPL-SCNC: 139 MEQ/L (ref 135–145)
TROPONIN, HIGH SENSITIVITY: 6 NG/L (ref 0–12)
TROPONIN, HIGH SENSITIVITY: < 6 NG/L (ref 0–12)
WBC # BLD AUTO: 13.9 THOU/MM3 (ref 4.8–10.8)

## 2025-01-04 PROCEDURE — 80053 COMPREHEN METABOLIC PANEL: CPT

## 2025-01-04 PROCEDURE — 99285 EMERGENCY DEPT VISIT HI MDM: CPT

## 2025-01-04 PROCEDURE — 96374 THER/PROPH/DIAG INJ IV PUSH: CPT

## 2025-01-04 PROCEDURE — 6370000000 HC RX 637 (ALT 250 FOR IP): Performed by: EMERGENCY MEDICINE

## 2025-01-04 PROCEDURE — 85025 COMPLETE CBC W/AUTO DIFF WBC: CPT

## 2025-01-04 PROCEDURE — 93010 ELECTROCARDIOGRAM REPORT: CPT | Performed by: INTERNAL MEDICINE

## 2025-01-04 PROCEDURE — 85730 THROMBOPLASTIN TIME PARTIAL: CPT

## 2025-01-04 PROCEDURE — 93005 ELECTROCARDIOGRAM TRACING: CPT | Performed by: EMERGENCY MEDICINE

## 2025-01-04 PROCEDURE — 85610 PROTHROMBIN TIME: CPT

## 2025-01-04 PROCEDURE — 71046 X-RAY EXAM CHEST 2 VIEWS: CPT

## 2025-01-04 PROCEDURE — 85379 FIBRIN DEGRADATION QUANT: CPT

## 2025-01-04 PROCEDURE — 83735 ASSAY OF MAGNESIUM: CPT

## 2025-01-04 PROCEDURE — 36415 COLL VENOUS BLD VENIPUNCTURE: CPT

## 2025-01-04 PROCEDURE — 84484 ASSAY OF TROPONIN QUANT: CPT

## 2025-01-04 PROCEDURE — 83880 ASSAY OF NATRIURETIC PEPTIDE: CPT

## 2025-01-04 PROCEDURE — 6360000002 HC RX W HCPCS: Performed by: EMERGENCY MEDICINE

## 2025-01-04 RX ORDER — ASPIRIN 81 MG/1
243 TABLET, CHEWABLE ORAL ONCE
Status: COMPLETED | OUTPATIENT
Start: 2025-01-04 | End: 2025-01-04

## 2025-01-04 RX ORDER — NITROGLYCERIN 0.4 MG/1
0.4 TABLET SUBLINGUAL EVERY 5 MIN PRN
Status: DISCONTINUED | OUTPATIENT
Start: 2025-01-04 | End: 2025-01-04 | Stop reason: HOSPADM

## 2025-01-04 RX ORDER — LORAZEPAM 2 MG/ML
0.5 INJECTION INTRAMUSCULAR ONCE
Status: COMPLETED | OUTPATIENT
Start: 2025-01-04 | End: 2025-01-04

## 2025-01-04 RX ADMIN — ASPIRIN 243 MG: 81 TABLET, CHEWABLE ORAL at 10:35

## 2025-01-04 RX ADMIN — NITROGLYCERIN 0.4 MG: 0.4 TABLET, ORALLY DISINTEGRATING SUBLINGUAL at 10:35

## 2025-01-04 RX ADMIN — LORAZEPAM 0.5 MG: 2 INJECTION INTRAMUSCULAR; INTRAVENOUS at 10:39

## 2025-01-04 ASSESSMENT — PAIN SCALES - GENERAL
PAINLEVEL_OUTOF10: 4
PAINLEVEL_OUTOF10: 2

## 2025-01-04 ASSESSMENT — HEART SCORE: ECG: NON-SPECIFC REPOLARIZATION DISTURBANCE/LBTB/PM

## 2025-01-04 NOTE — ED NOTES
This nurse out to ED lobby to bring patient back to ED exam room as assigned. Patient immediately yelling and screaming at this nurse saying \"why would you make me wait in triage\" this nurse explaining that that is registration and a way to obtain patient identification. Patient continues to say \"I am suing you and this hospital if I am having a heart attack. This is bullshit. I should not have to wait this long\". This nurse explaining to patient that she was brought back immediately after being checked in. Patient met in the ED exam room by charge nurse and other ED staff to obtain timely EKG. Patient continues to yell and scream and curse at staff. Patient stating \"I am going to call police!! This is ridiculous!\". This nurse called campus police and house supervisor to alert of situation of patient being verbally abusive to staff.

## 2025-01-04 NOTE — ED NOTES
Pt resting on cot. No distress noted. Pt states that she is feeling better. Rates pain a 1/10. Call light in reach. Lights off and door closed for comfort.

## 2025-01-04 NOTE — DISCHARGE INSTRUCTIONS
Please be sure to attend your follow-up cardiology appointment on January 9th at 10 AM.  If your symptoms get worse, return to the ER.

## 2025-01-04 NOTE — ED TRIAGE NOTES
Patient brought straight back to room by triage nurse in a wheelchair. Patient screaming in the [place] stating \"I'm going to jeannie. Why would you take your sweet ass time getting me back when I have chest pain.\" During triage patient called nurse a \"stupid ass cunt.\" Nurse asked patient to calm down so she can be treated and attempted to explain that the nurse came out immediately to get her and that she was in the waiting room for less than a minute. While in wheelchair patient stating that she is calling the police. Nurse asked patient if she is wanting to be evaluated for her chest pain and if she is to please get in the bed so she can be evaluated. Patient continued to curse at staff. [place] police and house supervisor called for a show of support. EKG completed and given to Dr. Zhao.

## 2025-01-05 NOTE — ED PROVIDER NOTES
status: Never    Smokeless tobacco: Never   Vaping Use    Vaping status: Never Used   Substance Use Topics    Alcohol use: Never    Drug use: No       ALLERGIES     Allergies   Allergen Reactions    Aspirin-Acetaminophen-Caffeine Nausea And Vomiting     excedrin    Caffeine Nausea And Vomiting    Percocet [Oxycodone-Acetaminophen]     Prednisone Nausea And Vomiting       FAMILY HISTORY     Family History   Problem Relation Age of Onset    Cancer Mother         luekemia    Depression Mother     Early Death Mother     Heart Disease Mother     High Blood Pressure Mother     High Cholesterol Mother     Diabetes Father     Early Death Father     Kidney Disease Father     Heart Disease Father     High Blood Pressure Father     High Cholesterol Father     Learning Disabilities Father     Substance Abuse Father     Cancer Maternal Aunt     Arthritis Maternal Grandmother     Stroke Paternal Grandfather        PHYSICAL EXAM     ED Triage Vitals [01/04/25 0952]   BP Systolic BP Percentile Diastolic BP Percentile Temp Temp Source Pulse Respirations SpO2   (!) 167/109 -- -- 98.7 °F (37.1 °C) Oral (!) 103 16 99 %      Height Weight         -- --             Additional Vital Signs:  Vitals:    01/04/25 1252   BP: 119/67   Pulse: 76   Resp: 16   Temp:    SpO2: 98%     Physical Exam  Constitutional:       Appearance: Normal appearance.   HENT:      Head: Normocephalic and atraumatic.      Nose: Nose normal.      Mouth/Throat:      Mouth: Mucous membranes are moist.      Pharynx: Oropharynx is clear.   Eyes:      Extraocular Movements: Extraocular movements intact.      Pupils: Pupils are equal, round, and reactive to light.   Cardiovascular:      Rate and Rhythm: Regular rhythm. Tachycardia present.      Pulses: Normal pulses.   Pulmonary:      Effort: Pulmonary effort is normal.      Breath sounds: Normal breath sounds.   Abdominal:      General: There is no distension.      Palpations: Abdomen is soft.      Tenderness: There is no

## 2025-01-09 ENCOUNTER — OFFICE VISIT (OUTPATIENT)
Dept: CARDIOLOGY CLINIC | Age: 65
End: 2025-01-09
Payer: MEDICARE

## 2025-01-09 VITALS
HEART RATE: 82 BPM | HEIGHT: 66 IN | BODY MASS INDEX: 32.92 KG/M2 | SYSTOLIC BLOOD PRESSURE: 113 MMHG | WEIGHT: 204.8 LBS | DIASTOLIC BLOOD PRESSURE: 71 MMHG

## 2025-01-09 DIAGNOSIS — Z82.49 FAMILY HISTORY OF PREMATURE CAD: ICD-10-CM

## 2025-01-09 DIAGNOSIS — R94.31 ABNORMAL EKG: ICD-10-CM

## 2025-01-09 DIAGNOSIS — E78.00 PURE HYPERCHOLESTEROLEMIA: ICD-10-CM

## 2025-01-09 DIAGNOSIS — K21.9 GASTROESOPHAGEAL REFLUX DISEASE, UNSPECIFIED WHETHER ESOPHAGITIS PRESENT: ICD-10-CM

## 2025-01-09 DIAGNOSIS — Z87.898 HISTORY OF CHEST PAIN: ICD-10-CM

## 2025-01-09 DIAGNOSIS — R07.89 CHEST PAIN, ATYPICAL: Primary | ICD-10-CM

## 2025-01-09 PROCEDURE — 99204 OFFICE O/P NEW MOD 45 MIN: CPT | Performed by: INTERNAL MEDICINE

## 2025-01-09 PROCEDURE — 1036F TOBACCO NON-USER: CPT | Performed by: INTERNAL MEDICINE

## 2025-01-09 PROCEDURE — 3017F COLORECTAL CA SCREEN DOC REV: CPT | Performed by: INTERNAL MEDICINE

## 2025-01-09 PROCEDURE — G8417 CALC BMI ABV UP PARAM F/U: HCPCS | Performed by: INTERNAL MEDICINE

## 2025-01-09 PROCEDURE — G8427 DOCREV CUR MEDS BY ELIG CLIN: HCPCS | Performed by: INTERNAL MEDICINE

## 2025-01-09 NOTE — PROGRESS NOTES
Chief Complaint   Patient presents with    New Patient    Chest Pain   Originally patient referral for abnormal EKG referred by pcp.    Was in the ED 1 week back for heartburn after hot coffee- seen in [facility]  Got GI cocktail in the ED and felt better  Burning type of cp      Last seen dec 2021  New patient from ED follow up for CP.  On 2025      Chest pain on 2025 started while watching TV at 10 am, retrosternal, and epigastric juan n,  sharp, 10/10,  some in jaw  left, no associated sob or sweat but shaky,  Last for 45 min and started to get better and in ER the cp  given NTG x1 and resolved andsent home  Troponin 6 x2    No more chest pain after    Denies  sob, palpitations,  and MARY.    No previous hx of cp and pat used to exercise  on treadmill    EKG done 2025.    Hx of occasional dizziness with sinus issue    Nonsmoker    FHX  Mother had CVA 70  Father had MI at 72      New patient from ED follow up for CP.  On 2025      Chest pain on 2025 started while watching TV at 10 am, retrosternal, and epigastric juan n,  sharp, 10/10,  some in jaw  left, no associated sob or sweat but shaky,  Last for 45 min and started to get better and in ER the cp  given NTG x1 and resolved andsent home  Troponin 6 x2    No more chest pain after    Denies  sob, palpitations,  and MARY.    No previous hx of cp and pat used to exercise  on treadmill    EKG done 2025.    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

## 2025-01-14 NOTE — TELEPHONE ENCOUNTER
Assessment and Plan   57-year-old female with relevant history of some sort of food allergy who presents with a rash over the base of her neck going on the last couple of weeks.  Very pruritic.  On exam papular red and rash that is somewhat shiny as she has just put Vaseline on it.  I most suspicious for atopic dermatitis.  I think an irritant dermatitis with her make-up is also in the differential.  I recommended not applying make-up to the area and trialing a moderate potency steroid cream over the next 2 weeks.  Asked her to contact me if this is not improving symptoms and would refer to dermatology.    1. Atopic dermatitis, unspecified type (Primary)  - triamcinolone (KENALOG) 0.1 % external cream; Apply topically 2 times daily for 14 days.  Dispense: 45 g; Refill: 0    Follow up: PRN    Options for treatment and follow-up care were reviewed with the patient and/or guardian. Roque Singer and/or guardian engaged in the decision making process and verbalized understanding of the options discussed and agreed with the final plan.    Dr. Ty Hatfield         HPI:   Roque Singer is a 57 year old  female who presents for:    Chief Complaint   Patient presents with    Derm Problem     Dry Skin under neck and chest      Patient tells me that over the last several weeks she has noticed a rash over her neck that has appeared as dry reddened spots.  It is pruritic but not painful.  She has been using Vaseline along with some clotrimazole/betamethasone over-the-counter cream and it has not been helping.  She talked with the pharmacist about this who thought she had dry skin and recommended going to her doctor.         PMHX:     Patient Active Problem List   Diagnosis    Food allergy    Gastritis due to Helicobacter species    Nonulcer dyspepsia    Other hyperlipidemia    Prediabetes    Vitamin D deficiency    Post-menopausal    Gastroesophageal reflux disease with esophagitis, unspecified whether hemorrhage    Skin tag     Patient called stating she had seizure yesterday. She was doing housework when she fell to the floor. She denies hitting her head. She does not remember what happened after falling to the floor. She remembers waking up in panic. Her cheeks bilaterally were tingling afterward and still today. She felt tired afterward. She is still tired today. She is taking Lamictal 250 mg 2 times a day. She denies missing any doses. She took an extra Lamictal 100 mg yesterday after the seizure. Your last note stated to consider Vimpat as next step. Instructed no driving, swimming, operating heavy machinery, or compromising heights until event free 6 months. Please advise. Thank you. "History of anxiety       Social History     Tobacco Use    Smoking status: Never     Passive exposure: Never    Smokeless tobacco: Never   Vaping Use    Vaping status: Never Used       Social History     Social History Narrative    Hmong speaking       Allergies   Allergen Reactions    Aciphex [Rabeprazole] Swelling    Metoclopramide Swelling     Tongue Swelling    Amoxicillin-Pot Clavulanate     Famotidine     Ondansetron Other (See Comments)     Constipation and myoclonic jerks       No results found for this or any previous visit (from the past 24 hours).         Review of Systems:    ROS: 10 point ROS neg other than the symptoms noted above in the HPI.         Physical Exam:     Vitals:    01/14/25 0854   BP: 120/80   Pulse: 72   Resp: 16   Temp: 97.1  F (36.2  C)   TempSrc: Temporal   SpO2: 99%   Weight: 61.6 kg (135 lb 12.8 oz)   Height: 1.499 m (4' 11\")     Body mass index is 27.43 kg/m .    General appearance: Alert, cooperative, no distress, appears stated age  Head: Normocephalic, atraumatic, without obvious abnormality  Eyes: Pupils equal round, reactive.  Conjunctiva clear.  Nose: Nares normal, no drainage.  Throat: Lips, mucosa, tongue normal mucosa pink and moist  Neck: Supple, symmetric, trachea midline,  Lungs: Clear to auscultation bilaterally, no wheezing or crackles present.  Respirations unlabored  Skin: Reddened papules over the base of her neck into her upper chest.            "

## 2025-01-17 ENCOUNTER — HOSPITAL ENCOUNTER (OUTPATIENT)
Dept: MRI IMAGING | Age: 65
Discharge: HOME | End: 2025-01-17
Payer: MEDICARE

## 2025-01-17 DIAGNOSIS — R20.2 PARESTHESIA: ICD-10-CM

## 2025-01-17 PROCEDURE — 72148 MRI LUMBAR SPINE W/O DYE: CPT

## 2025-01-17 PROCEDURE — 70551 MRI BRAIN STEM W/O DYE: CPT

## 2025-01-17 PROCEDURE — 72146 MRI CHEST SPINE W/O DYE: CPT

## 2025-01-17 PROCEDURE — 72141 MRI NECK SPINE W/O DYE: CPT

## 2025-01-30 ENCOUNTER — HOSPITAL ENCOUNTER (OUTPATIENT)
Dept: GENERAL RADIOLOGY | Age: 65
Discharge: HOME | End: 2025-01-30
Payer: MEDICARE

## 2025-01-30 ENCOUNTER — HOSPITAL ENCOUNTER (OUTPATIENT)
Age: 65
Discharge: HOME | End: 2025-01-30
Payer: MEDICARE

## 2025-01-30 DIAGNOSIS — Z01.818 PRE-OP EVALUATION: ICD-10-CM

## 2025-01-30 LAB
ALBUMIN SERPL BCG-MCNC: 4.1 G/DL (ref 3.5–5.1)
ANION GAP SERPL CALC-SCNC: 9 MEQ/L (ref 8–16)
BASOPHILS ABSOLUTE: 0.1 THOU/MM3 (ref 0–0.1)
BASOPHILS NFR BLD AUTO: 0.8 %
BUN SERPL-MCNC: 16 MG/DL (ref 7–22)
CALCIUM SERPL-MCNC: 9.5 MG/DL (ref 8.5–10.5)
CHLORIDE SERPL-SCNC: 104 MEQ/L (ref 98–111)
CO2 SERPL-SCNC: 27 MEQ/L (ref 23–33)
CREAT SERPL-MCNC: 0.8 MG/DL (ref 0.4–1.2)
DEPRECATED RDW RBC AUTO: 56 FL (ref 35–45)
EKG ATRIAL RATE: 65 BPM
EKG P AXIS: 55 DEGREES
EKG P-R INTERVAL: 166 MS
EKG Q-T INTERVAL: 394 MS
EKG QRS DURATION: 68 MS
EKG QTC CALCULATION (BAZETT): 409 MS
EKG R AXIS: 41 DEGREES
EKG T AXIS: 58 DEGREES
EKG VENTRICULAR RATE: 65 BPM
EOSINOPHIL NFR BLD AUTO: 3.6 %
EOSINOPHILS ABSOLUTE: 0.3 THOU/MM3 (ref 0–0.4)
ERYTHROCYTE [DISTWIDTH] IN BLOOD BY AUTOMATED COUNT: 16.7 % (ref 11.5–14.5)
GFR SERPL CREATININE-BSD FRML MDRD: 83 ML/MIN/1.73M2
GLUCOSE SERPL-MCNC: 97 MG/DL (ref 70–108)
HCT VFR BLD AUTO: 42.7 % (ref 37–47)
HGB BLD-MCNC: 13.7 GM/DL (ref 12–16)
IMM GRANULOCYTES # BLD AUTO: 0.01 THOU/MM3 (ref 0–0.07)
IMM GRANULOCYTES NFR BLD AUTO: 0.1 %
LYMPHOCYTES ABSOLUTE: 3.6 THOU/MM3 (ref 1–4.8)
LYMPHOCYTES NFR BLD AUTO: 48.3 %
MCH RBC QN AUTO: 29.1 PG (ref 26–33)
MCHC RBC AUTO-ENTMCNC: 32.1 GM/DL (ref 32.2–35.5)
MCV RBC AUTO: 90.9 FL (ref 81–99)
MONOCYTES ABSOLUTE: 0.6 THOU/MM3 (ref 0.4–1.3)
MONOCYTES NFR BLD AUTO: 8.4 %
NEUTROPHILS ABSOLUTE: 2.9 THOU/MM3 (ref 1.8–7.7)
NEUTROPHILS NFR BLD AUTO: 38.8 %
NRBC BLD AUTO-RTO: 0 /100 WBC
PLATELET # BLD AUTO: 390 THOU/MM3 (ref 130–400)
PMV BLD AUTO: 10.6 FL (ref 9.4–12.4)
POTASSIUM SERPL-SCNC: 4.3 MEQ/L (ref 3.5–5.2)
PREALB SERPL-MCNC: 18 MG/DL (ref 20–40)
RBC # BLD AUTO: 4.7 MILL/MM3 (ref 4.2–5.4)
SODIUM SERPL-SCNC: 140 MEQ/L (ref 135–145)
WBC # BLD AUTO: 7.5 THOU/MM3 (ref 4.8–10.8)

## 2025-01-30 PROCEDURE — 85025 COMPLETE CBC W/AUTO DIFF WBC: CPT

## 2025-01-30 PROCEDURE — 36415 COLL VENOUS BLD VENIPUNCTURE: CPT

## 2025-01-30 PROCEDURE — 71046 X-RAY EXAM CHEST 2 VIEWS: CPT

## 2025-01-30 PROCEDURE — 93005 ELECTROCARDIOGRAM TRACING: CPT | Performed by: PHYSICIAN ASSISTANT

## 2025-01-30 PROCEDURE — 84134 ASSAY OF PREALBUMIN: CPT

## 2025-01-30 PROCEDURE — 80048 BASIC METABOLIC PNL TOTAL CA: CPT

## 2025-01-30 PROCEDURE — 82040 ASSAY OF SERUM ALBUMIN: CPT

## 2025-01-30 PROCEDURE — 87641 MR-STAPH DNA AMP PROBE: CPT

## 2025-01-31 LAB — MRSA DNA SPEC QL NAA+PROBE: NEGATIVE

## 2025-02-14 ENCOUNTER — HOSPITAL ENCOUNTER (OUTPATIENT)
Age: 65
Discharge: HOME HEALTH | End: 2025-02-17
Attending: STUDENT IN AN ORGANIZED HEALTH CARE EDUCATION/TRAINING PROGRAM | Admitting: ORTHOPAEDIC SURGERY
Payer: MEDICARE

## 2025-02-14 ENCOUNTER — ANESTHESIA (OUTPATIENT)
Dept: OPERATING ROOM | Age: 65
End: 2025-02-14
Payer: MEDICARE

## 2025-02-14 ENCOUNTER — ANESTHESIA EVENT (OUTPATIENT)
Dept: OPERATING ROOM | Age: 65
End: 2025-02-14
Payer: MEDICARE

## 2025-02-14 DIAGNOSIS — R13.10 DYSPHAGIA, UNSPECIFIED TYPE: Primary | ICD-10-CM

## 2025-02-14 DIAGNOSIS — Z98.890 STATUS POST INCISION AND DRAINAGE: ICD-10-CM

## 2025-02-14 PROCEDURE — 6370000000 HC RX 637 (ALT 250 FOR IP): Performed by: PHYSICIAN ASSISTANT

## 2025-02-14 PROCEDURE — 2720000010 HC SURG SUPPLY STERILE: Performed by: ORTHOPAEDIC SURGERY

## 2025-02-14 PROCEDURE — 3700000000 HC ANESTHESIA ATTENDED CARE: Performed by: ORTHOPAEDIC SURGERY

## 2025-02-14 PROCEDURE — 6360000002 HC RX W HCPCS: Performed by: NURSE ANESTHETIST, CERTIFIED REGISTERED

## 2025-02-14 PROCEDURE — 7100000001 HC PACU RECOVERY - ADDTL 15 MIN: Performed by: ORTHOPAEDIC SURGERY

## 2025-02-14 PROCEDURE — 2580000003 HC RX 258: Performed by: NURSE ANESTHETIST, CERTIFIED REGISTERED

## 2025-02-14 PROCEDURE — 3600000013 HC SURGERY LEVEL 3 ADDTL 15MIN: Performed by: ORTHOPAEDIC SURGERY

## 2025-02-14 PROCEDURE — 2500000003 HC RX 250 WO HCPCS: Performed by: NURSE ANESTHETIST, CERTIFIED REGISTERED

## 2025-02-14 PROCEDURE — 2709999900 HC NON-CHARGEABLE SUPPLY: Performed by: ORTHOPAEDIC SURGERY

## 2025-02-14 PROCEDURE — 99285 EMERGENCY DEPT VISIT HI MDM: CPT

## 2025-02-14 PROCEDURE — 7100000000 HC PACU RECOVERY - FIRST 15 MIN: Performed by: ORTHOPAEDIC SURGERY

## 2025-02-14 PROCEDURE — 3700000001 HC ADD 15 MINUTES (ANESTHESIA): Performed by: ORTHOPAEDIC SURGERY

## 2025-02-14 PROCEDURE — 3600000003 HC SURGERY LEVEL 3 BASE: Performed by: ORTHOPAEDIC SURGERY

## 2025-02-14 RX ORDER — FENTANYL CITRATE 50 UG/ML
INJECTION, SOLUTION INTRAMUSCULAR; INTRAVENOUS
Status: DISCONTINUED | OUTPATIENT
Start: 2025-02-14 | End: 2025-02-14 | Stop reason: SDUPTHER

## 2025-02-14 RX ORDER — LIDOCAINE HCL/PF 100 MG/5ML
SYRINGE (ML) INJECTION
Status: DISCONTINUED | OUTPATIENT
Start: 2025-02-14 | End: 2025-02-14 | Stop reason: SDUPTHER

## 2025-02-14 RX ORDER — DEXAMETHASONE SODIUM PHOSPHATE 4 MG/ML
8 INJECTION, SOLUTION INTRA-ARTICULAR; INTRALESIONAL; INTRAMUSCULAR; INTRAVENOUS; SOFT TISSUE EVERY 8 HOURS
Status: DISCONTINUED | OUTPATIENT
Start: 2025-02-15 | End: 2025-02-17 | Stop reason: HOSPADM

## 2025-02-14 RX ORDER — ROCURONIUM BROMIDE 10 MG/ML
INJECTION, SOLUTION INTRAVENOUS
Status: DISCONTINUED | OUTPATIENT
Start: 2025-02-14 | End: 2025-02-14 | Stop reason: SDUPTHER

## 2025-02-14 RX ORDER — DEXAMETHASONE SODIUM PHOSPHATE 10 MG/ML
INJECTION, EMULSION INTRAMUSCULAR; INTRAVENOUS
Status: DISCONTINUED | OUTPATIENT
Start: 2025-02-14 | End: 2025-02-14 | Stop reason: SDUPTHER

## 2025-02-14 RX ORDER — ONDANSETRON 2 MG/ML
4 INJECTION INTRAMUSCULAR; INTRAVENOUS EVERY 6 HOURS PRN
Status: DISCONTINUED | OUTPATIENT
Start: 2025-02-14 | End: 2025-02-17 | Stop reason: HOSPADM

## 2025-02-14 RX ORDER — CYCLOBENZAPRINE HCL 10 MG
10 TABLET ORAL 3 TIMES DAILY PRN
Status: DISCONTINUED | OUTPATIENT
Start: 2025-02-14 | End: 2025-02-17 | Stop reason: HOSPADM

## 2025-02-14 RX ORDER — CLONAZEPAM 0.5 MG/1
1 TABLET ORAL NIGHTLY PRN
Status: DISCONTINUED | OUTPATIENT
Start: 2025-02-14 | End: 2025-02-17 | Stop reason: HOSPADM

## 2025-02-14 RX ORDER — ONDANSETRON 2 MG/ML
INJECTION INTRAMUSCULAR; INTRAVENOUS
Status: DISCONTINUED | OUTPATIENT
Start: 2025-02-14 | End: 2025-02-14 | Stop reason: SDUPTHER

## 2025-02-14 RX ORDER — SODIUM CHLORIDE 0.9 % (FLUSH) 0.9 %
5-40 SYRINGE (ML) INJECTION PRN
Status: DISCONTINUED | OUTPATIENT
Start: 2025-02-14 | End: 2025-02-17 | Stop reason: HOSPADM

## 2025-02-14 RX ORDER — ALBUTEROL SULFATE 90 UG/1
2 INHALANT RESPIRATORY (INHALATION) EVERY 6 HOURS PRN
Status: DISCONTINUED | OUTPATIENT
Start: 2025-02-14 | End: 2025-02-14

## 2025-02-14 RX ORDER — SODIUM CHLORIDE 9 MG/ML
INJECTION, SOLUTION INTRAVENOUS PRN
Status: DISCONTINUED | OUTPATIENT
Start: 2025-02-14 | End: 2025-02-17 | Stop reason: HOSPADM

## 2025-02-14 RX ORDER — SODIUM CHLORIDE 9 MG/ML
INJECTION, SOLUTION INTRAVENOUS CONTINUOUS
Status: DISCONTINUED | OUTPATIENT
Start: 2025-02-14 | End: 2025-02-17 | Stop reason: HOSPADM

## 2025-02-14 RX ORDER — POLYETHYLENE GLYCOL 3350 17 G/17G
17 POWDER, FOR SOLUTION ORAL DAILY
Status: DISCONTINUED | OUTPATIENT
Start: 2025-02-14 | End: 2025-02-17 | Stop reason: HOSPADM

## 2025-02-14 RX ORDER — CEFAZOLIN SODIUM 1 G/3ML
INJECTION, POWDER, FOR SOLUTION INTRAMUSCULAR; INTRAVENOUS
Status: DISCONTINUED | OUTPATIENT
Start: 2025-02-14 | End: 2025-02-14 | Stop reason: SDUPTHER

## 2025-02-14 RX ORDER — TRAZODONE HYDROCHLORIDE 100 MG/1
100 TABLET ORAL NIGHTLY
Status: DISCONTINUED | OUTPATIENT
Start: 2025-02-14 | End: 2025-02-17 | Stop reason: HOSPADM

## 2025-02-14 RX ORDER — BISACODYL 5 MG/1
5 TABLET, DELAYED RELEASE ORAL DAILY
Status: DISCONTINUED | OUTPATIENT
Start: 2025-02-14 | End: 2025-02-17 | Stop reason: HOSPADM

## 2025-02-14 RX ORDER — MIDAZOLAM HYDROCHLORIDE 1 MG/ML
INJECTION, SOLUTION INTRAMUSCULAR; INTRAVENOUS
Status: DISCONTINUED | OUTPATIENT
Start: 2025-02-14 | End: 2025-02-14 | Stop reason: SDUPTHER

## 2025-02-14 RX ORDER — HYDROCODONE BITARTRATE AND ACETAMINOPHEN 5; 325 MG/1; MG/1
2 TABLET ORAL EVERY 4 HOURS PRN
Status: DISCONTINUED | OUTPATIENT
Start: 2025-02-14 | End: 2025-02-17 | Stop reason: HOSPADM

## 2025-02-14 RX ORDER — ONDANSETRON 4 MG/1
4 TABLET, ORALLY DISINTEGRATING ORAL EVERY 8 HOURS PRN
Status: DISCONTINUED | OUTPATIENT
Start: 2025-02-14 | End: 2025-02-17 | Stop reason: HOSPADM

## 2025-02-14 RX ORDER — SUCCINYLCHOLINE/SOD CL,ISO/PF 200MG/10ML
SYRINGE (ML) INTRAVENOUS
Status: DISCONTINUED | OUTPATIENT
Start: 2025-02-14 | End: 2025-02-14 | Stop reason: SDUPTHER

## 2025-02-14 RX ORDER — SODIUM CHLORIDE 0.9 % (FLUSH) 0.9 %
5-40 SYRINGE (ML) INJECTION EVERY 12 HOURS SCHEDULED
Status: DISCONTINUED | OUTPATIENT
Start: 2025-02-14 | End: 2025-02-17 | Stop reason: HOSPADM

## 2025-02-14 RX ORDER — SODIUM CHLORIDE 9 MG/ML
INJECTION, SOLUTION INTRAVENOUS
Status: DISCONTINUED | OUTPATIENT
Start: 2025-02-14 | End: 2025-02-14 | Stop reason: SDUPTHER

## 2025-02-14 RX ORDER — TRANEXAMIC ACID 100 MG/ML
INJECTION, SOLUTION INTRAVENOUS
Status: DISCONTINUED | OUTPATIENT
Start: 2025-02-14 | End: 2025-02-14 | Stop reason: SDUPTHER

## 2025-02-14 RX ORDER — HYDROCODONE BITARTRATE AND ACETAMINOPHEN 5; 325 MG/1; MG/1
1 TABLET ORAL EVERY 4 HOURS PRN
Status: DISCONTINUED | OUTPATIENT
Start: 2025-02-14 | End: 2025-02-17 | Stop reason: HOSPADM

## 2025-02-14 RX ORDER — PHENYLEPHRINE HCL IN 0.9% NACL 1 MG/10 ML
SYRINGE (ML) INTRAVENOUS
Status: DISCONTINUED | OUTPATIENT
Start: 2025-02-14 | End: 2025-02-14 | Stop reason: SDUPTHER

## 2025-02-14 RX ORDER — PROPOFOL 10 MG/ML
INJECTION, EMULSION INTRAVENOUS
Status: DISCONTINUED | OUTPATIENT
Start: 2025-02-14 | End: 2025-02-14 | Stop reason: SDUPTHER

## 2025-02-14 RX ORDER — ATORVASTATIN CALCIUM 20 MG/1
20 TABLET, FILM COATED ORAL DAILY
Status: DISCONTINUED | OUTPATIENT
Start: 2025-02-15 | End: 2025-02-17 | Stop reason: HOSPADM

## 2025-02-14 RX ADMIN — DEXAMETHASONE SODIUM PHOSPHATE 10 MG: 10 INJECTION, EMULSION INTRAMUSCULAR; INTRAVENOUS at 17:38

## 2025-02-14 RX ADMIN — PROPOFOL 150 MG: 10 INJECTION, EMULSION INTRAVENOUS at 17:32

## 2025-02-14 RX ADMIN — Medication 100 MG: at 17:32

## 2025-02-14 RX ADMIN — Medication 100 MCG: at 17:46

## 2025-02-14 RX ADMIN — Medication 100 MCG: at 17:40

## 2025-02-14 RX ADMIN — Medication 100 MCG: at 17:42

## 2025-02-14 RX ADMIN — TRAZODONE HYDROCHLORIDE 100 MG: 100 TABLET ORAL at 23:25

## 2025-02-14 RX ADMIN — SUGAMMADEX 200 MG: 100 INJECTION, SOLUTION INTRAVENOUS at 18:17

## 2025-02-14 RX ADMIN — Medication 100 MCG: at 17:53

## 2025-02-14 RX ADMIN — BISACODYL 5 MG: 5 TABLET, COATED ORAL at 23:22

## 2025-02-14 RX ADMIN — Medication 100 MCG: at 17:48

## 2025-02-14 RX ADMIN — Medication 160 MG: at 17:32

## 2025-02-14 RX ADMIN — FENTANYL CITRATE 50 MCG: 50 INJECTION, SOLUTION INTRAMUSCULAR; INTRAVENOUS at 17:27

## 2025-02-14 RX ADMIN — CEFAZOLIN 2 G: 1 INJECTION, POWDER, FOR SOLUTION INTRAMUSCULAR; INTRAVENOUS at 17:50

## 2025-02-14 RX ADMIN — Medication 100 MCG: at 17:56

## 2025-02-14 RX ADMIN — SODIUM CHLORIDE: 9 INJECTION, SOLUTION INTRAVENOUS at 17:16

## 2025-02-14 RX ADMIN — FENTANYL CITRATE 50 MCG: 50 INJECTION, SOLUTION INTRAMUSCULAR; INTRAVENOUS at 18:38

## 2025-02-14 RX ADMIN — Medication 100 MCG: at 17:44

## 2025-02-14 RX ADMIN — LAMOTRIGINE 250 MG: 25 TABLET ORAL at 23:22

## 2025-02-14 RX ADMIN — Medication 100 MCG: at 17:50

## 2025-02-14 RX ADMIN — Medication 100 MCG: at 17:38

## 2025-02-14 RX ADMIN — HYDROCODONE BITARTRATE AND ACETAMINOPHEN 2 TABLET: 5; 325 TABLET ORAL at 19:00

## 2025-02-14 RX ADMIN — TRANEXAMIC ACID 1000 MG: 100 INJECTION, SOLUTION INTRAVENOUS at 17:50

## 2025-02-14 RX ADMIN — CYCLOBENZAPRINE 10 MG: 10 TABLET, FILM COATED ORAL at 19:00

## 2025-02-14 RX ADMIN — MIDAZOLAM 2 MG: 1 INJECTION INTRAMUSCULAR; INTRAVENOUS at 17:22

## 2025-02-14 RX ADMIN — ROCURONIUM BROMIDE 20 MG: 10 INJECTION, SOLUTION INTRAVENOUS at 17:45

## 2025-02-14 RX ADMIN — ONDANSETRON 4 MG: 2 INJECTION, SOLUTION INTRAMUSCULAR; INTRAVENOUS at 18:13

## 2025-02-14 ASSESSMENT — PAIN DESCRIPTION - DESCRIPTORS
DESCRIPTORS: ACHING;SPASM
DESCRIPTORS: ACHING

## 2025-02-14 ASSESSMENT — PAIN - FUNCTIONAL ASSESSMENT: PAIN_FUNCTIONAL_ASSESSMENT: 0-10

## 2025-02-14 ASSESSMENT — PAIN SCALES - GENERAL
PAINLEVEL_OUTOF10: 7
PAINLEVEL_OUTOF10: 3

## 2025-02-14 NOTE — ANESTHESIA PRE PROCEDURE
tablet by mouth nightly as needed.    Provider, MD Per       Current medications:    No current facility-administered medications for this encounter.     Current Outpatient Medications   Medication Sig Dispense Refill    albuterol sulfate HFA (PROVENTIL HFA) 108 (90 Base) MCG/ACT inhaler Inhale 2 puffs into the lungs every 6 hours as needed for Wheezing 1 each 0    lamoTRIgine (LAMICTAL) 100 MG tablet TAKE 2 & 1/2 (TWO & ONE-HALF) TABLETS BY MOUTH TWICE DAILY 150 tablet 2    aspirin EC 81 MG EC tablet Take 1 tablet by mouth daily 90 tablet 1    loratadine (CLARITIN) 10 MG tablet Loratadine (Claritin) 10 mg Tablet Active 10 MG PO Daily March 15th, 2021 9:03am      atorvastatin (LIPITOR) 20 MG tablet Take 1 tablet by mouth daily      traZODone (DESYREL) 50 MG tablet Take 2 tablets by mouth nightly      Ascorbic Acid (VITAMIN C) 1000 MG tablet Take 1 tablet by mouth 2 times daily      Cholecalciferol (VITAMIN D3) 50 MCG (2000 UT) CAPS Take by mouth daily      senna (SENOKOT) 8.6 MG tablet Take 1 tablet by mouth 2 times daily      ondansetron (ZOFRAN) 8 MG tablet Take 1 tablet by mouth every 8 hours as needed for Nausea or Vomiting      FLUoxetine HCl (PROZAC PO) Take 60 mg by mouth daily       therapeutic multivitamin-minerals (THERAGRAN-M) tablet Take 1 tablet by mouth daily      Calcium Carbonate (CALTRATE 600 PO) Take by mouth daily       clonazePAM (KLONOPIN) 1 MG tablet Take 1 tablet by mouth nightly as needed.         Allergies:    Allergies   Allergen Reactions    Aspirin-Acetaminophen-Caffeine Nausea And Vomiting     excedrin    Caffeine Nausea And Vomiting    Percocet [Oxycodone-Acetaminophen]     Prednisone Nausea And Vomiting       Problem List:    Patient Active Problem List   Diagnosis Code    Chest pain, atypical R07.89    Pure hypercholesterolemia E78.00    Family history of premature CAD Z82.49    History of chest pain Z87.898    GERD (gastroesophageal reflux disease) K21.9    Abnormal EKG

## 2025-02-14 NOTE — H&P
Orthopedic Spine H&P  Department of Orthopedic Surgery  Elijah Trotter PA-C  Attending: Dr. Bebeto Sutton    Chief Complaint: Shortness of breath    HPI: Gale is a 64 y.o female who recently underwent an ACDF C4-C6 at [facility] by Dr. Sutton on 2/10/25. Surgery went well and she was discharged home with her PIYUSH drain intact and HHC on POD 1. Her PIYUSH drain was minimal and removed on Wednesday and she developed increased SOB and dysphagia yesterday. Her symptoms worsened this morning and we were contacted by [facility] who informed the patient to come to the ED at [facility] for immediate evaluation. On presentation, she had a weak voice and complained of difficulty swallowing. With the risk and concern for a postop seroma/hematoma we recommended to take the patient to the OR tonight for evacuation of seroma with cervical wound exploration. Consent signed and she is currently NPO.     Assessment:    1. Postoperative dysphagia and SOB s/p ACDF C4-C6 2/10/25 at [facility]    Plan:    1. NPO and plan for OR this evening for cervical I&D with wound exploration    Allergies   Allergen Reactions    Aspirin-Acetaminophen-Caffeine Nausea And Vomiting     excedrin    Caffeine Nausea And Vomiting    Percocet [Oxycodone-Acetaminophen]     Prednisone Nausea And Vomiting     Prior to Visit Medications    Medication Sig Taking? Authorizing Provider   albuterol sulfate HFA (PROVENTIL HFA) 108 (90 Base) MCG/ACT inhaler Inhale 2 puffs into the lungs every 6 hours as needed for Wheezing  Lily Bruner, APRN - CNP   lamoTRIgine (LAMICTAL) 100 MG tablet TAKE 2 & 1/2 (TWO & ONE-HALF) TABLETS BY MOUTH TWICE DAILY  Sejal Monroy MD   aspirin EC 81 MG EC tablet Take 1 tablet by mouth daily  Jolly Thompson MD   loratadine (CLARITIN) 10 MG tablet Loratadine (Claritin) 10 mg Tablet Active 10 MG PO Daily March 15th, 2021 9:03am  Per Nolan MD   atorvastatin (LIPITOR) 20 MG tablet Take 1 tablet by mouth daily  Provider, Historical, MD   traZODone

## 2025-02-14 NOTE — ED PROVIDER NOTES
[facility] EMERGENCY DEPARTMENT      EMERGENCY MEDICINE     Pt Name: Gale Kelsey  MRN: [id number]  Birthdate 1960  Date of evaluation: 2/14/2025  Provider: Jasper Sy DO  Supervising Physician: Dex Enamorado DO    CHIEF COMPLAINT       Chief Complaint   Patient presents with    Shortness of Breath     HISTORY OF PRESENT ILLNESS   Gale Kelsey is a 64 y.o. female who is POD 4 from Cervical spinal fusion with Dr Sutton who presents to the emergency department from home for evaluation of dysphagia ever since his surgery.  Patient reports she has not keep any foods down or any pills but has been to keep liquids down.  Earlier today she tried eating something and had difficulty swallowing and threw up and briefly felt short of breath but that has resolved since then, she denies any current chest pain, difficulty breathing, fever.  Patient reports the surgery was done because she had numbness and her bilateral upper extremities but that that is since resolved.    PASTMEDICAL HISTORY     Past Medical History:   Diagnosis Date    Anxiety     Depression     Seizures (HCC)        Patient Active Problem List   Diagnosis Code    Chest pain, atypical R07.89    Pure hypercholesterolemia E78.00    Family history of premature CAD Z82.49    History of chest pain Z87.898    GERD (gastroesophageal reflux disease) K21.9    Abnormal EKG R94.31     SURGICAL HISTORY       Past Surgical History:   Procedure Laterality Date    CAPSULE ENDOSCOPY      CYST REMOVAL      right ribcage    KNEE CARTILAGE SURGERY Left 03/01/2024    KNEE SURGERY      SPINE SURGERY      SPLENECTOMY      TUMOR REMOVAL  1974    stomach    WRIST GANGLION EXCISION      right wrist       CURRENT MEDICATIONS       Previous Medications    ALBUTEROL SULFATE HFA (PROVENTIL HFA) 108 (90 BASE) MCG/ACT INHALER    Inhale 2 puffs into the lungs every 6 hours as needed for Wheezing    ASCORBIC ACID (VITAMIN C) 1000 MG TABLET    Take 1 tablet by

## 2025-02-14 NOTE — ED TRIAGE NOTES
PT to the ED with difficulty breathing and swallowing following a fusion of C4.5.6 on Monday. PT states that there is also concern that drainage tube was removed too soon. PT states surgery was done by Dr Sutton. Dr Sutton at bedside. Respirations are unlabored at this time.

## 2025-02-14 NOTE — PROGRESS NOTES
1836 Awake and oriented on arrival to PACU , O2 3L , pt c/o # 3 neck pain , medicated with Fentanyl from CRNA  1840 eyes closed reps easy   1855 resting resp easy   1900 Awakens on own states starting to have pain rating a # 7 , medicated with Norco 2 tabs and flexeril 10 mg po  1915 resting on and off , unable to locate family to give update and room number   1925 Attempted to call report unable to get a hold of any one  1930 attempted to call reached R.N but was transferred and it went straight to voice mail , called previous number back , states she will go tell R.N to call med   1935 attempted to call floor again, R.N states not able to take Pt , informed I would be sending for transport  1947 pt meets criteria for discharge , pt transported to

## 2025-02-14 NOTE — BRIEF OP NOTE
Brief Postoperative Note      Patient: Gale Kelsey  YOB: 1960  MRN: [id number]    Date of Procedure: 2/14/2025    Pre-Op Diagnosis Codes:      * SOB (shortness of breath) [R06.02]    Post-Op Diagnosis: Same       Procedure(s):  CERVICAL SPINE INCISION AND DRAINAGE    Surgeon(s):  Bebeto Sutton MD    Assistant:  Physician Assistant: Elijah Trotter PA-C    Anesthesia: General    Estimated Blood Loss (mL): less than 50     Complications: None    Specimens:   * No specimens in log *    Implants:  * No implants in log *      Drains: * No LDAs found *    Findings:  Infection Present At Time Of Surgery (PATOS) (choose all levels that have infection present):  No infection present  Other Findings: same    Electronically signed by Bebeto Sutton MD on 2/14/2025 at 6:13 PM

## 2025-02-15 LAB
BASOPHILS ABSOLUTE: 0 THOU/MM3 (ref 0–0.1)
BASOPHILS NFR BLD AUTO: 0 %
DEPRECATED RDW RBC AUTO: 54.9 FL (ref 35–45)
EOSINOPHIL NFR BLD AUTO: 0 %
EOSINOPHILS ABSOLUTE: 0 THOU/MM3 (ref 0–0.4)
ERYTHROCYTE [DISTWIDTH] IN BLOOD BY AUTOMATED COUNT: 16.2 % (ref 11.5–14.5)
HCT VFR BLD AUTO: 41.9 % (ref 37–47)
HGB BLD-MCNC: 13.4 GM/DL (ref 12–16)
IMM GRANULOCYTES # BLD AUTO: 0.01 THOU/MM3 (ref 0–0.07)
IMM GRANULOCYTES NFR BLD AUTO: 0.2 %
LYMPHOCYTES ABSOLUTE: 0.8 THOU/MM3 (ref 1–4.8)
LYMPHOCYTES NFR BLD AUTO: 15 %
MCH RBC QN AUTO: 29.5 PG (ref 26–33)
MCHC RBC AUTO-ENTMCNC: 32 GM/DL (ref 32.2–35.5)
MCV RBC AUTO: 92.1 FL (ref 81–99)
MONOCYTES ABSOLUTE: 0.1 THOU/MM3 (ref 0.4–1.3)
MONOCYTES NFR BLD AUTO: 1.2 %
NEUTROPHILS ABSOLUTE: 4.2 THOU/MM3 (ref 1.8–7.7)
NEUTROPHILS NFR BLD AUTO: 83.6 %
NRBC BLD AUTO-RTO: 0 /100 WBC
PLATELET # BLD AUTO: 428 THOU/MM3 (ref 130–400)
PMV BLD AUTO: 10.7 FL (ref 9.4–12.4)
RBC # BLD AUTO: 4.55 MILL/MM3 (ref 4.2–5.4)
WBC # BLD AUTO: 5 THOU/MM3 (ref 4.8–10.8)

## 2025-02-15 PROCEDURE — 2500000003 HC RX 250 WO HCPCS: Performed by: PHYSICIAN ASSISTANT

## 2025-02-15 PROCEDURE — 6360000002 HC RX W HCPCS: Performed by: PHYSICIAN ASSISTANT

## 2025-02-15 PROCEDURE — 97165 OT EVAL LOW COMPLEX 30 MIN: CPT

## 2025-02-15 PROCEDURE — 85025 COMPLETE CBC W/AUTO DIFF WBC: CPT

## 2025-02-15 PROCEDURE — 6370000000 HC RX 637 (ALT 250 FOR IP): Performed by: PHYSICIAN ASSISTANT

## 2025-02-15 PROCEDURE — 2580000003 HC RX 258: Performed by: PHYSICIAN ASSISTANT

## 2025-02-15 PROCEDURE — 36415 COLL VENOUS BLD VENIPUNCTURE: CPT

## 2025-02-15 PROCEDURE — 97535 SELF CARE MNGMENT TRAINING: CPT

## 2025-02-15 RX ADMIN — ATORVASTATIN CALCIUM 20 MG: 20 TABLET, FILM COATED ORAL at 09:44

## 2025-02-15 RX ADMIN — DEXAMETHASONE SODIUM PHOSPHATE 8 MG: 4 INJECTION, SOLUTION INTRA-ARTICULAR; INTRALESIONAL; INTRAMUSCULAR; INTRAVENOUS; SOFT TISSUE at 03:25

## 2025-02-15 RX ADMIN — SODIUM CHLORIDE: 0.9 INJECTION, SOLUTION INTRAVENOUS at 03:24

## 2025-02-15 RX ADMIN — SODIUM CHLORIDE, PRESERVATIVE FREE 10 ML: 5 INJECTION INTRAVENOUS at 09:44

## 2025-02-15 RX ADMIN — TRAZODONE HYDROCHLORIDE 100 MG: 100 TABLET ORAL at 21:52

## 2025-02-15 RX ADMIN — HYDROCODONE BITARTRATE AND ACETAMINOPHEN 2 TABLET: 5; 325 TABLET ORAL at 09:47

## 2025-02-15 RX ADMIN — HYDROCODONE BITARTRATE AND ACETAMINOPHEN 2 TABLET: 5; 325 TABLET ORAL at 17:20

## 2025-02-15 RX ADMIN — LAMOTRIGINE 250 MG: 25 TABLET ORAL at 21:52

## 2025-02-15 RX ADMIN — LAMOTRIGINE 250 MG: 25 TABLET ORAL at 09:00

## 2025-02-15 RX ADMIN — CYCLOBENZAPRINE 10 MG: 10 TABLET, FILM COATED ORAL at 17:22

## 2025-02-15 RX ADMIN — SODIUM CHLORIDE, PRESERVATIVE FREE 10 ML: 5 INJECTION INTRAVENOUS at 21:53

## 2025-02-15 RX ADMIN — BISACODYL 5 MG: 5 TABLET, COATED ORAL at 09:44

## 2025-02-15 RX ADMIN — POLYETHYLENE GLYCOL 3350 17 G: 17 POWDER, FOR SOLUTION ORAL at 09:44

## 2025-02-15 RX ADMIN — DEXAMETHASONE SODIUM PHOSPHATE 8 MG: 4 INJECTION, SOLUTION INTRA-ARTICULAR; INTRALESIONAL; INTRAMUSCULAR; INTRAVENOUS; SOFT TISSUE at 17:22

## 2025-02-15 RX ADMIN — DEXAMETHASONE SODIUM PHOSPHATE 8 MG: 4 INJECTION, SOLUTION INTRA-ARTICULAR; INTRALESIONAL; INTRAMUSCULAR; INTRAVENOUS; SOFT TISSUE at 09:43

## 2025-02-15 ASSESSMENT — PAIN SCALES - GENERAL
PAINLEVEL_OUTOF10: 4
PAINLEVEL_OUTOF10: 0
PAINLEVEL_OUTOF10: 4
PAINLEVEL_OUTOF10: 8
PAINLEVEL_OUTOF10: 7

## 2025-02-15 ASSESSMENT — PAIN DESCRIPTION - LOCATION
LOCATION: NECK
LOCATION: NECK

## 2025-02-15 ASSESSMENT — PAIN SCALES - WONG BAKER
WONGBAKER_NUMERICALRESPONSE: HURTS A LITTLE BIT
WONGBAKER_NUMERICALRESPONSE: HURTS A LITTLE BIT

## 2025-02-15 NOTE — ANESTHESIA POSTPROCEDURE EVALUATION
Department of Anesthesiology  Postprocedure Note    Patient: Gale Kelsey  MRN: [id number]  YOB: 1960  Date of evaluation: 2/15/2025    Procedure Summary       Date: 02/14/25 Room / Location: [facility] OR [address] [place] [address] OR    Anesthesia Start: 1716 Anesthesia Stop: 1838    Procedure: CERVICAL SPINE INCISION AND DRAINAGE Diagnosis:       SOB (shortness of breath)      (SOB (shortness of breath) [R06.02])    Surgeons: Bebeto Sutton MD Responsible Provider: Markus Prabhakar MD    Anesthesia Type: general ASA Status: 2            Anesthesia Type: No value filed.    Chepe Phase I: Chepe Score: 9    Chepe Phase II:      Anesthesia Post Evaluation    Patient location during evaluation: PACU  Patient participation: complete - patient participated  Level of consciousness: awake and alert  Airway patency: patent  Nausea & Vomiting: no nausea and no vomiting  Cardiovascular status: hemodynamically stable  Respiratory status: acceptable  Hydration status: euvolemic  Pain management: adequate    [facility]  POST-ANESTHESIA NOTE       Name:  Gale Kelsey                                         Age:  64 y.o.  MRN:  [id number]      Last Vitals:  /83   Pulse 93   Temp 97.8 °F (36.6 °C) (Oral)   Resp 18   Ht 1.702 m (5' 7\")   Wt 86.2 kg (190 lb)   SpO2 96%   BMI 29.76 kg/m²   Patient Vitals for the past 4 hrs:   BP Temp Temp src Pulse Resp SpO2   02/15/25 0328 136/83 97.8 °F (36.6 °C) Oral 93 18 96 %       Level of Consciousness:  Awake    Respiratory:  Stable    Oxygen Saturation:  Stable    Cardiovascular:  Stable    Hydration:  Adequate    PONV:  Stable    Post-op Pain:  Adequate analgesia    Post-op Assessment:  No apparent anesthetic complications    Additional Follow-Up / Treatment / Comment:  None    MARKUS PRABHAKAR MD  February 15, 2025   4:37 AM      No notable events documented.

## 2025-02-15 NOTE — PROGRESS NOTES
[facility]  INPATIENT OCCUPATIONAL THERAPY  [facility] ORTHOPEDICS 7K  EVALUATION      Discharge Recommendations: Home with assist PRN  Equipment Recommendations: No        Time In: 0759  Time Out: 08  Timed Code Treatment Minutes: 15 Minutes  Minutes: 23          Date: 2/15/2025  Patient Name: Gale Kelsey,   Gender: female      MRN: [id number]  : 1960  (64 y.o.)  Referring Practitioner: Elijah Trotter PA-C  Diagnosis: Status post incision and drainage  Additional Pertinent Hx: Per H&P: Gale is a 64 y.o female who recently underwent an ACDF C4-C6 at [facility] by Dr. Sutton on 2/10/25. Surgery went well and she was discharged home with her PIYUSH drain intact and HHC on POD 1. Her PIYUSH drain was minimal and removed on Wednesday and she developed increased SOB and dysphagia yesterday. Her symptoms worsened this morning and we were contacted by [facility] who informed the patient to come to the ED at [facility] for immediate evaluation. On presentation, she had a weak voice and complained of difficulty swallowing. With the risk and concern for a postop seroma/hematoma we recommended to take the patient to the OR tonight for evacuation of seroma with cervical wound exploration. Consent signed and she is currently NPO.   Pt is s/p CERVICAL SPINE INCISION AND DRAINAGE  on  with Dr. Sutton    Restrictions/Precautions:  Restrictions/Precautions: General Precautions  Required Braces or Orthoses  Cervical: [place] J  Position Activity Restriction  Spinal Precautions: No Bending, No Twisting, No Lifting  Spinal Precautions Comments: cervical precautions    Subjective  Chart Reviewed: Yes, Orders, Progress Notes, History and Physical, Operative Notes  Patient assessed for rehabilitation services?: Yes    Subjective: RN okayed session. Pt was resting in bed upon arrival, pleasant and agreeable to OT. At end of session, pt left in chair with breakfast tray. Chair alarm present, but not plugged in. JOSÉ MANUEL Myers pt for no

## 2025-02-15 NOTE — OP NOTE
[address] [address]                            OPERATIVE REPORT      PATIENT NAME: CAMDEN AZEVEDO            : 1960  MED REC NO: [id number]                       ROOM: [facility]  ACCOUNT NO: [id number]                       ADMIT DATE: 2025  PROVIDER: Bebeto Sutton MD      DATE OF PROCEDURE:  2025    SURGEON:  Bebeto Sutton MD    PREOPERATIVE DIAGNOSES:    1. Status post anterior cervical diskectomy and fusion of C4 through C6, date of surgery of 2/10/25.   2. Postoperative cervical spine seroma.    POSTOPERATIVE DIAGNOSES:    1. Status post anterior cervical diskectomy and fusion of C4 through C6, date of surgery of 2/10/25.   2. Postoperative cervical spine seroma.    PROCEDURES:  Incision, irrigation, and debridement of cervical seroma from cervical spine wound with closure and placement of new Kostas-Sarmiento drain.    ASSISTANT:  Elijah Trotter PA-C, who assisted with the patient positioning, prepping and draping, surgical retraction, dissection of cervical spine wound and evacuation of seroma, placement of drain, and wound closure.    ANESTHESIA:  General.    BLOOD LOSS:  Minimal to less than 30 mL including some seroma.    SPECIMEN:  Zero.    INDICATION:  The patient presents, 64 years of age, having symptoms of change of voice and some dysphagia.  This began about 2 days ago from the removal of her Kostas-Sarmiento drain and she has had some swelling.  We have offered to bring her into the hospital because of this concern that she may be having some swelling or bleeding that could cause this discomfort and dysfunction.  I saw her in the emergency room myself, looked everything over, and her neck did appear to be somewhat swollen, but she was speaking with a softer voice.  With the recent surgery, we elected to bring her upstairs to the operating room to evacuate any seroma that may be present, perform an

## 2025-02-15 NOTE — PROGRESS NOTES
Gale Kelsey is a 64 y.o. female patient.    Current Facility-Administered Medications   Medication Dose Route Frequency Provider Last Rate Last Admin    atorvastatin (LIPITOR) tablet 20 mg  20 mg Oral Daily Elijah Trotter PA-C        clonazePAM (KLONOPIN) tablet 1 mg  1 mg Oral Nightly PRN Elijah Trotter PA-C        lamoTRIgine (LAMICTAL) tablet 250 mg  250 mg Oral BID Elijah Trotter PA-C   250 mg at 02/14/25 2322    traZODone (DESYREL) tablet 100 mg  100 mg Oral Nightly Elijah Trotter PA-C   100 mg at 02/14/25 2325    0.9 % sodium chloride infusion   IntraVENous Continuous Elijah Trotter PA-C 100 mL/hr at 02/15/25 0324 New Bag at 02/15/25 0324    sodium chloride flush 0.9 % injection 5-40 mL  5-40 mL IntraVENous 2 times per day Elijah Trotter PA-C        sodium chloride flush 0.9 % injection 5-40 mL  5-40 mL IntraVENous PRN Elijah Trotter PA-C        0.9 % sodium chloride infusion   IntraVENous PRN Elijah Trotter PA-C        ondansetron (ZOFRAN-ODT) disintegrating tablet 4 mg  4 mg Oral Q8H PRN Elijah Trotter PA-C        Or    ondansetron (ZOFRAN) injection 4 mg  4 mg IntraVENous Q6H PRN Elijah Trotter PA-C        cyclobenzaprine (FLEXERIL) tablet 10 mg  10 mg Oral TID PRN Elijah Trotter PA-C   10 mg at 02/14/25 1900    HYDROmorphone (DILAUDID) injection 0.25 mg  0.25 mg IntraVENous Q3H PRN Elijah Trotter PA-C        Or    HYDROmorphone (DILAUDID) injection 0.5 mg  0.5 mg IntraVENous Q3H PRN Elijah Trotter PA-C        polyethylene glycol (GLYCOLAX) packet 17 g  17 g Oral Daily Elijah Trotter PA-C        bisacodyl (DULCOLAX) EC tablet 5 mg  5 mg Oral Daily Elijah Trotter PA-C   5 mg at 02/14/25 2322    HYDROcodone-acetaminophen (NORCO) 5-325 MG per tablet 1 tablet  1 tablet Oral Q4H PRN Elijah Trotter PA-C        Or    HYDROcodone-acetaminophen (NORCO) 5-325 MG per tablet 2 tablet  2 tablet Oral Q4H PRN Elijah Trotter PA-C   2 tablet at 02/14/25 1900    Menthol lozenge 4.8 mg  1 lozenge Oral

## 2025-02-16 LAB
BASOPHILS ABSOLUTE: 0 THOU/MM3 (ref 0–0.1)
BASOPHILS NFR BLD AUTO: 0.1 %
DEPRECATED RDW RBC AUTO: 55.6 FL (ref 35–45)
EOSINOPHIL NFR BLD AUTO: 0 %
EOSINOPHILS ABSOLUTE: 0 THOU/MM3 (ref 0–0.4)
ERYTHROCYTE [DISTWIDTH] IN BLOOD BY AUTOMATED COUNT: 16.6 % (ref 11.5–14.5)
HCT VFR BLD AUTO: 42.6 % (ref 37–47)
HGB BLD-MCNC: 13.9 GM/DL (ref 12–16)
IMM GRANULOCYTES # BLD AUTO: 0.03 THOU/MM3 (ref 0–0.07)
IMM GRANULOCYTES NFR BLD AUTO: 0.3 %
LYMPHOCYTES ABSOLUTE: 1.1 THOU/MM3 (ref 1–4.8)
LYMPHOCYTES NFR BLD AUTO: 10 %
MCH RBC QN AUTO: 29.5 PG (ref 26–33)
MCHC RBC AUTO-ENTMCNC: 32.6 GM/DL (ref 32.2–35.5)
MCV RBC AUTO: 90.4 FL (ref 81–99)
MONOCYTES ABSOLUTE: 0.2 THOU/MM3 (ref 0.4–1.3)
MONOCYTES NFR BLD AUTO: 2 %
NEUTROPHILS ABSOLUTE: 9.8 THOU/MM3 (ref 1.8–7.7)
NEUTROPHILS NFR BLD AUTO: 87.6 %
NRBC BLD AUTO-RTO: 0 /100 WBC
PLATELET # BLD AUTO: 472 THOU/MM3 (ref 130–400)
PMV BLD AUTO: 10.5 FL (ref 9.4–12.4)
RBC # BLD AUTO: 4.71 MILL/MM3 (ref 4.2–5.4)
WBC # BLD AUTO: 11.2 THOU/MM3 (ref 4.8–10.8)

## 2025-02-16 PROCEDURE — 2500000003 HC RX 250 WO HCPCS: Performed by: PHYSICIAN ASSISTANT

## 2025-02-16 PROCEDURE — 85025 COMPLETE CBC W/AUTO DIFF WBC: CPT

## 2025-02-16 PROCEDURE — 6360000002 HC RX W HCPCS: Performed by: PHYSICIAN ASSISTANT

## 2025-02-16 PROCEDURE — 6370000000 HC RX 637 (ALT 250 FOR IP): Performed by: PHYSICIAN ASSISTANT

## 2025-02-16 PROCEDURE — 36415 COLL VENOUS BLD VENIPUNCTURE: CPT

## 2025-02-16 RX ADMIN — DEXAMETHASONE SODIUM PHOSPHATE 8 MG: 4 INJECTION, SOLUTION INTRA-ARTICULAR; INTRALESIONAL; INTRAMUSCULAR; INTRAVENOUS; SOFT TISSUE at 09:04

## 2025-02-16 RX ADMIN — SODIUM CHLORIDE, PRESERVATIVE FREE 10 ML: 5 INJECTION INTRAVENOUS at 09:06

## 2025-02-16 RX ADMIN — ATORVASTATIN CALCIUM 20 MG: 20 TABLET, FILM COATED ORAL at 09:04

## 2025-02-16 RX ADMIN — CYCLOBENZAPRINE 10 MG: 10 TABLET, FILM COATED ORAL at 12:24

## 2025-02-16 RX ADMIN — CYCLOBENZAPRINE 10 MG: 10 TABLET, FILM COATED ORAL at 19:47

## 2025-02-16 RX ADMIN — LAMOTRIGINE 250 MG: 25 TABLET ORAL at 19:46

## 2025-02-16 RX ADMIN — DEXAMETHASONE SODIUM PHOSPHATE 8 MG: 4 INJECTION, SOLUTION INTRA-ARTICULAR; INTRALESIONAL; INTRAMUSCULAR; INTRAVENOUS; SOFT TISSUE at 17:45

## 2025-02-16 RX ADMIN — TRAZODONE HYDROCHLORIDE 100 MG: 100 TABLET ORAL at 19:47

## 2025-02-16 RX ADMIN — SODIUM CHLORIDE, PRESERVATIVE FREE 10 ML: 5 INJECTION INTRAVENOUS at 19:46

## 2025-02-16 RX ADMIN — DEXAMETHASONE SODIUM PHOSPHATE 8 MG: 4 INJECTION, SOLUTION INTRA-ARTICULAR; INTRALESIONAL; INTRAMUSCULAR; INTRAVENOUS; SOFT TISSUE at 01:45

## 2025-02-16 RX ADMIN — POLYETHYLENE GLYCOL 3350 17 G: 17 POWDER, FOR SOLUTION ORAL at 09:05

## 2025-02-16 RX ADMIN — CYCLOBENZAPRINE 10 MG: 10 TABLET, FILM COATED ORAL at 05:00

## 2025-02-16 RX ADMIN — LAMOTRIGINE 250 MG: 25 TABLET ORAL at 09:05

## 2025-02-16 RX ADMIN — BISACODYL 5 MG: 5 TABLET, COATED ORAL at 09:04

## 2025-02-16 ASSESSMENT — PAIN SCALES - GENERAL
PAINLEVEL_OUTOF10: 3
PAINLEVEL_OUTOF10: 1
PAINLEVEL_OUTOF10: 7
PAINLEVEL_OUTOF10: 2
PAINLEVEL_OUTOF10: 3
PAINLEVEL_OUTOF10: 2

## 2025-02-16 ASSESSMENT — PAIN - FUNCTIONAL ASSESSMENT: PAIN_FUNCTIONAL_ASSESSMENT: ACTIVITIES ARE NOT PREVENTED

## 2025-02-16 ASSESSMENT — PAIN DESCRIPTION - PAIN TYPE
TYPE: SURGICAL PAIN

## 2025-02-16 ASSESSMENT — PAIN DESCRIPTION - LOCATION
LOCATION: ABDOMEN;SHOULDER
LOCATION: NECK

## 2025-02-16 ASSESSMENT — PAIN DESCRIPTION - DESCRIPTORS
DESCRIPTORS: SPASM
DESCRIPTORS: ACHING
DESCRIPTORS: SPASM
DESCRIPTORS: SPASM

## 2025-02-16 ASSESSMENT — PAIN DESCRIPTION - ORIENTATION: ORIENTATION: LEFT;RIGHT

## 2025-02-17 VITALS
BODY MASS INDEX: 29.82 KG/M2 | HEART RATE: 70 BPM | OXYGEN SATURATION: 95 % | HEIGHT: 67 IN | RESPIRATION RATE: 18 BRPM | DIASTOLIC BLOOD PRESSURE: 74 MMHG | WEIGHT: 190 LBS | SYSTOLIC BLOOD PRESSURE: 157 MMHG | TEMPERATURE: 97.9 F

## 2025-02-17 LAB
BASOPHILS ABSOLUTE: 0 THOU/MM3 (ref 0–0.1)
BASOPHILS NFR BLD AUTO: 0.1 %
DEPRECATED RDW RBC AUTO: 56.5 FL (ref 35–45)
EOSINOPHIL NFR BLD AUTO: 0 %
EOSINOPHILS ABSOLUTE: 0 THOU/MM3 (ref 0–0.4)
ERYTHROCYTE [DISTWIDTH] IN BLOOD BY AUTOMATED COUNT: 16.8 % (ref 11.5–14.5)
HCT VFR BLD AUTO: 40.3 % (ref 37–47)
HGB BLD-MCNC: 12.9 GM/DL (ref 12–16)
IMM GRANULOCYTES # BLD AUTO: 0.05 THOU/MM3 (ref 0–0.07)
IMM GRANULOCYTES NFR BLD AUTO: 0.4 %
LYMPHOCYTES ABSOLUTE: 1.3 THOU/MM3 (ref 1–4.8)
LYMPHOCYTES NFR BLD AUTO: 12 %
MCH RBC QN AUTO: 29.3 PG (ref 26–33)
MCHC RBC AUTO-ENTMCNC: 32 GM/DL (ref 32.2–35.5)
MCV RBC AUTO: 91.4 FL (ref 81–99)
MONOCYTES ABSOLUTE: 0.6 THOU/MM3 (ref 0.4–1.3)
MONOCYTES NFR BLD AUTO: 5 %
NEUTROPHILS ABSOLUTE: 9.2 THOU/MM3 (ref 1.8–7.7)
NEUTROPHILS NFR BLD AUTO: 82.5 %
NRBC BLD AUTO-RTO: 0 /100 WBC
PLATELET # BLD AUTO: 454 THOU/MM3 (ref 130–400)
PMV BLD AUTO: 11.1 FL (ref 9.4–12.4)
RBC # BLD AUTO: 4.41 MILL/MM3 (ref 4.2–5.4)
WBC # BLD AUTO: 11.2 THOU/MM3 (ref 4.8–10.8)

## 2025-02-17 PROCEDURE — 85025 COMPLETE CBC W/AUTO DIFF WBC: CPT

## 2025-02-17 PROCEDURE — 6370000000 HC RX 637 (ALT 250 FOR IP): Performed by: PHYSICIAN ASSISTANT

## 2025-02-17 PROCEDURE — 2500000003 HC RX 250 WO HCPCS: Performed by: PHYSICIAN ASSISTANT

## 2025-02-17 PROCEDURE — 36415 COLL VENOUS BLD VENIPUNCTURE: CPT

## 2025-02-17 PROCEDURE — 6360000002 HC RX W HCPCS: Performed by: PHYSICIAN ASSISTANT

## 2025-02-17 RX ORDER — HYDROCODONE BITARTRATE AND ACETAMINOPHEN 5; 325 MG/1; MG/1
1 TABLET ORAL EVERY 4 HOURS PRN
Qty: 42 TABLET | Refills: 0 | Status: SHIPPED | OUTPATIENT
Start: 2025-02-17 | End: 2025-02-21

## 2025-02-17 RX ORDER — CYCLOBENZAPRINE HCL 10 MG
10 TABLET ORAL 3 TIMES DAILY PRN
Qty: 50 TABLET | Refills: 0 | Status: ON HOLD | OUTPATIENT
Start: 2025-02-17 | End: 2025-02-27

## 2025-02-17 RX ADMIN — BISACODYL 5 MG: 5 TABLET, COATED ORAL at 07:52

## 2025-02-17 RX ADMIN — DEXAMETHASONE SODIUM PHOSPHATE 8 MG: 4 INJECTION, SOLUTION INTRA-ARTICULAR; INTRALESIONAL; INTRAMUSCULAR; INTRAVENOUS; SOFT TISSUE at 03:33

## 2025-02-17 RX ADMIN — POLYETHYLENE GLYCOL 3350 17 G: 17 POWDER, FOR SOLUTION ORAL at 07:52

## 2025-02-17 RX ADMIN — ATORVASTATIN CALCIUM 20 MG: 20 TABLET, FILM COATED ORAL at 07:52

## 2025-02-17 RX ADMIN — LAMOTRIGINE 250 MG: 25 TABLET ORAL at 07:52

## 2025-02-17 RX ADMIN — SODIUM CHLORIDE, PRESERVATIVE FREE 10 ML: 5 INJECTION INTRAVENOUS at 07:52

## 2025-02-17 RX ADMIN — CYCLOBENZAPRINE 10 MG: 10 TABLET, FILM COATED ORAL at 03:33

## 2025-02-17 RX ADMIN — DEXAMETHASONE SODIUM PHOSPHATE 8 MG: 4 INJECTION, SOLUTION INTRA-ARTICULAR; INTRALESIONAL; INTRAMUSCULAR; INTRAVENOUS; SOFT TISSUE at 10:21

## 2025-02-17 ASSESSMENT — PAIN DESCRIPTION - LOCATION: LOCATION: SHOULDER

## 2025-02-17 ASSESSMENT — PAIN SCALES - GENERAL
PAINLEVEL_OUTOF10: 3
PAINLEVEL_OUTOF10: 4

## 2025-02-17 ASSESSMENT — PAIN DESCRIPTION - DESCRIPTORS: DESCRIPTORS: ACHING

## 2025-02-17 ASSESSMENT — PAIN DESCRIPTION - ORIENTATION: ORIENTATION: RIGHT;LEFT

## 2025-02-17 NOTE — PROGRESS NOTES
Department of Orthopedic Surgery  Spine Service  Attending Progress Note        Subjective:  Pain improved but struggling with PO intake    2/17/25  Gale is resting in bed. Still struggling with solid intake. Dr. Sutton discussed PEG tube yesterday. Patient would like to wait on this if at all possible. She feels she can maintain nutrition with full liquid oral intake with supplements. Would like to d/c home today.       Vitals  VITALS:  BP (!) 140/82   Pulse 69   Temp 98.1 °F (36.7 °C) (Oral)   Resp 17   Ht 1.702 m (5' 7\")   Wt 86.2 kg (190 lb)   SpO2 95%   BMI 29.76 kg/m²   24HR INTAKE/OUTPUT:    Intake/Output Summary (Last 24 hours) at 2/17/2025 0725  Last data filed at 2/17/2025 0500  Gross per 24 hour   Intake 630 ml   Output 5 ml   Net 625 ml     URINARY CATHETER OUTPUT (Cherry):     DRAIN/TUBE OUTPUT:  Closed/Suction Drain Anterior Neck Bulb-Output (ml): 0 ml      PHYSICAL EXAM:    Orientation:  alert and oriented to person, place and time    Incision:  dressing in place, clean, dry, intact    Upper Extremity Motor :   Deltoid:  5/5  Biceps:  5/5  Triceps:  5/5  Wrist Flexion:  5/5  Wrist Extension:  5/5  Finger Flexion:  5/5  Finger Extension:  5/5    Upper Motor Neuron Signs:  None  Upper Extremity Sensory:  Intact C3-T1 distribution      Flatus:  positive    ABNORMAL EXAM FINDINGS:  none    LABS:    HgB:    Lab Results   Component Value Date/Time    HGB 12.9 02/17/2025 03:21 AM    HGB 12.0 10/23/2011 05:31 AM         ASSESSMENT AND PLAN:    Post operative day 6/2 status post ACDF C4-C6/wound exploration    1:  Monitor labs and drain output  2:  Activity Level:  OOB ad hernandez  3:  Pain Control:  PO meds  4:  Discharge Planning: Possible d/c today, will discuss with Dr. Sutton.   5:  Consult GI for PEG tube placement

## 2025-02-17 NOTE — CARE COORDINATION
Case Management Assessment Initial Evaluation    Date/Time of Evaluation: 2/17/2025 11:56 AM  Assessment Completed by: Cristina Matos RN    If patient is discharged prior to next notation, then this note serves as note for discharge by case management.    Patient Name: Gale Kelsey                   YOB: 1960  Diagnosis: Status post incision and drainage [Z98.890]  Dysphagia, unspecified type [R13.10]                   Date / Time: 2/14/2025  4:27 PM  Location: [address] [place] [address]     Patient Admission Status: Outpatient in a bed   Readmission Risk Low 0-14, Mod 15-19), High > 20: No data recorded  Current PCP: Alex Tomas MD  Health Care Decision Makers:   Primary Decision Maker: Rickey Watson Christina - Other - [phone]    Additional Case Management Notes: to ED for dysphagia ever since her surgery on 2/10 [facility] Sutton    Procedures: 2/14 Incision, irrigation, and debridement of cervical seroma from cervical spine wound with closure and placement of new Kostas-Sarmiento drain.     Imaging: na    Patient Goals/Plan/Treatment Preferences: met with Gale; she plans home today and has transportation from a friend. Lives home alone, has insurance, was current with [facility].     11:58 AM  Called Shira at [facility]; faxed info and plan is to resume  for start of care 2/18/25.

## 2025-02-21 ENCOUNTER — APPOINTMENT (OUTPATIENT)
Dept: GENERAL RADIOLOGY | Age: 65
DRG: 176 | End: 2025-02-21
Payer: MEDICARE

## 2025-02-21 ENCOUNTER — APPOINTMENT (OUTPATIENT)
Dept: CT IMAGING | Age: 65
DRG: 176 | End: 2025-02-21
Payer: MEDICARE

## 2025-02-21 ENCOUNTER — HOSPITAL ENCOUNTER (INPATIENT)
Age: 65
LOS: 5 days | Discharge: HOME | DRG: 176 | End: 2025-02-26
Attending: EMERGENCY MEDICINE | Admitting: STUDENT IN AN ORGANIZED HEALTH CARE EDUCATION/TRAINING PROGRAM
Payer: MEDICARE

## 2025-02-21 DIAGNOSIS — I26.99 OTHER ACUTE PULMONARY EMBOLISM WITHOUT ACUTE COR PULMONALE (HCC): Primary | ICD-10-CM

## 2025-02-21 DIAGNOSIS — I26.99 ACUTE PULMONARY EMBOLISM, UNSPECIFIED PULMONARY EMBOLISM TYPE, UNSPECIFIED WHETHER ACUTE COR PULMONALE PRESENT (HCC): ICD-10-CM

## 2025-02-21 LAB
ANION GAP SERPL CALC-SCNC: 17 MEQ/L (ref 8–16)
APTT PPP: 26.1 SECONDS (ref 22–38)
BASOPHILS ABSOLUTE: 0 THOU/MM3 (ref 0–0.1)
BASOPHILS NFR BLD AUTO: 0.1 %
BUN SERPL-MCNC: 22 MG/DL (ref 7–22)
CALCIUM SERPL-MCNC: 9 MG/DL (ref 8.2–9.6)
CHLORIDE SERPL-SCNC: 99 MEQ/L (ref 98–111)
CO2 SERPL-SCNC: 26 MEQ/L (ref 23–33)
CREAT SERPL-MCNC: 0.7 MG/DL (ref 0.4–1.2)
D DIMER PPP IA.FEU-MCNC: 6048 NG/ML FEU (ref 0–500)
DEPRECATED RDW RBC AUTO: 53.1 FL (ref 35–45)
EKG ATRIAL RATE: 96 BPM
EKG P AXIS: 27 DEGREES
EKG P-R INTERVAL: 136 MS
EKG Q-T INTERVAL: 346 MS
EKG QRS DURATION: 66 MS
EKG QTC CALCULATION (BAZETT): 437 MS
EKG R AXIS: 19 DEGREES
EKG T AXIS: 38 DEGREES
EKG VENTRICULAR RATE: 96 BPM
EOSINOPHIL NFR BLD AUTO: 4.3 %
EOSINOPHILS ABSOLUTE: 0.5 THOU/MM3 (ref 0–0.4)
ERYTHROCYTE [DISTWIDTH] IN BLOOD BY AUTOMATED COUNT: 16.3 % (ref 11.5–14.5)
FLUAV RNA RESP QL NAA+PROBE: NOT DETECTED
FLUBV RNA RESP QL NAA+PROBE: NOT DETECTED
GFR SERPL CREATININE-BSD FRML MDRD: > 90 ML/MIN/1.73M2
GLUCOSE SERPL-MCNC: 102 MG/DL (ref 70–108)
HCT VFR BLD AUTO: 42.6 % (ref 37–47)
HEPARIN UNFRACTIONATED: < 0.04 U/ML (ref 0.3–0.7)
HGB BLD-MCNC: 14.2 GM/DL (ref 12–16)
IMM GRANULOCYTES # BLD AUTO: 0.03 THOU/MM3 (ref 0–0.07)
IMM GRANULOCYTES NFR BLD AUTO: 0.3 %
INR PPP: 0.96 (ref 0.85–1.13)
LYMPHOCYTES ABSOLUTE: 3.1 THOU/MM3 (ref 1–4.8)
LYMPHOCYTES NFR BLD AUTO: 27.1 %
MCH RBC QN AUTO: 29.5 PG (ref 26–33)
MCHC RBC AUTO-ENTMCNC: 33.3 GM/DL (ref 32.2–35.5)
MCV RBC AUTO: 88.6 FL (ref 81–99)
MONOCYTES ABSOLUTE: 1.1 THOU/MM3 (ref 0.4–1.3)
MONOCYTES NFR BLD AUTO: 9.5 %
NEUTROPHILS ABSOLUTE: 6.8 THOU/MM3 (ref 1.8–7.7)
NEUTROPHILS NFR BLD AUTO: 58.7 %
NRBC BLD AUTO-RTO: 0 /100 WBC
NT-PROBNP SERPL IA-MCNC: < 36 PG/ML (ref 0–124)
OSMOLALITY SERPL CALC.SUM OF ELEC: 286.6 MOSMOL/KG (ref 275–300)
PLATELET # BLD AUTO: 374 THOU/MM3 (ref 130–400)
PMV BLD AUTO: 10.8 FL (ref 9.4–12.4)
POTASSIUM SERPL-SCNC: 4.5 MEQ/L (ref 3.5–5.2)
RBC # BLD AUTO: 4.81 MILL/MM3 (ref 4.2–5.4)
SARS-COV-2 RNA RESP QL NAA+PROBE: NOT DETECTED
SODIUM SERPL-SCNC: 142 MEQ/L (ref 135–145)
TROPONIN, HIGH SENSITIVITY: 7 NG/L (ref 0–12)
WBC # BLD AUTO: 11.6 THOU/MM3 (ref 4.8–10.8)

## 2025-02-21 PROCEDURE — 1200000003 HC TELEMETRY R&B

## 2025-02-21 PROCEDURE — 85610 PROTHROMBIN TIME: CPT

## 2025-02-21 PROCEDURE — 85520 HEPARIN ASSAY: CPT

## 2025-02-21 PROCEDURE — 85730 THROMBOPLASTIN TIME PARTIAL: CPT

## 2025-02-21 PROCEDURE — 6370000000 HC RX 637 (ALT 250 FOR IP)

## 2025-02-21 PROCEDURE — 85379 FIBRIN DEGRADATION QUANT: CPT

## 2025-02-21 PROCEDURE — 84484 ASSAY OF TROPONIN QUANT: CPT

## 2025-02-21 PROCEDURE — 71045 X-RAY EXAM CHEST 1 VIEW: CPT

## 2025-02-21 PROCEDURE — 85025 COMPLETE CBC W/AUTO DIFF WBC: CPT

## 2025-02-21 PROCEDURE — 36415 COLL VENOUS BLD VENIPUNCTURE: CPT

## 2025-02-21 PROCEDURE — 93010 ELECTROCARDIOGRAM REPORT: CPT | Performed by: INTERNAL MEDICINE

## 2025-02-21 PROCEDURE — 93005 ELECTROCARDIOGRAM TRACING: CPT | Performed by: EMERGENCY MEDICINE

## 2025-02-21 PROCEDURE — 80048 BASIC METABOLIC PNL TOTAL CA: CPT

## 2025-02-21 PROCEDURE — 71275 CT ANGIOGRAPHY CHEST: CPT

## 2025-02-21 PROCEDURE — 6360000004 HC RX CONTRAST MEDICATION: Performed by: EMERGENCY MEDICINE

## 2025-02-21 PROCEDURE — 83880 ASSAY OF NATRIURETIC PEPTIDE: CPT

## 2025-02-21 PROCEDURE — 87636 SARSCOV2 & INF A&B AMP PRB: CPT

## 2025-02-21 PROCEDURE — 99285 EMERGENCY DEPT VISIT HI MDM: CPT

## 2025-02-21 PROCEDURE — 99223 1ST HOSP IP/OBS HIGH 75: CPT

## 2025-02-21 PROCEDURE — 6360000002 HC RX W HCPCS

## 2025-02-21 RX ORDER — CYCLOBENZAPRINE HCL 10 MG
10 TABLET ORAL 3 TIMES DAILY PRN
Status: DISCONTINUED | OUTPATIENT
Start: 2025-02-21 | End: 2025-02-26 | Stop reason: HOSPADM

## 2025-02-21 RX ORDER — POTASSIUM CHLORIDE 7.45 MG/ML
10 INJECTION INTRAVENOUS PRN
Status: DISCONTINUED | OUTPATIENT
Start: 2025-02-21 | End: 2025-02-26 | Stop reason: HOSPADM

## 2025-02-21 RX ORDER — DOCUSATE SODIUM 100 MG/1
100 CAPSULE, LIQUID FILLED ORAL 2 TIMES DAILY
COMMUNITY
Start: 2025-02-10

## 2025-02-21 RX ORDER — HEPARIN SODIUM 1000 [USP'U]/ML
80 INJECTION, SOLUTION INTRAVENOUS; SUBCUTANEOUS ONCE
Status: COMPLETED | OUTPATIENT
Start: 2025-02-21 | End: 2025-02-21

## 2025-02-21 RX ORDER — SODIUM CHLORIDE 0.9 % (FLUSH) 0.9 %
5-40 SYRINGE (ML) INJECTION PRN
Status: DISCONTINUED | OUTPATIENT
Start: 2025-02-21 | End: 2025-02-26 | Stop reason: HOSPADM

## 2025-02-21 RX ORDER — ONDANSETRON 4 MG/1
4 TABLET, ORALLY DISINTEGRATING ORAL EVERY 8 HOURS PRN
Status: DISCONTINUED | OUTPATIENT
Start: 2025-02-21 | End: 2025-02-22

## 2025-02-21 RX ORDER — MAGNESIUM SULFATE IN WATER 40 MG/ML
2000 INJECTION, SOLUTION INTRAVENOUS PRN
Status: DISCONTINUED | OUTPATIENT
Start: 2025-02-21 | End: 2025-02-26 | Stop reason: HOSPADM

## 2025-02-21 RX ORDER — SODIUM CHLORIDE 0.9 % (FLUSH) 0.9 %
5-40 SYRINGE (ML) INJECTION EVERY 12 HOURS SCHEDULED
Status: DISCONTINUED | OUTPATIENT
Start: 2025-02-21 | End: 2025-02-26 | Stop reason: HOSPADM

## 2025-02-21 RX ORDER — LISINOPRIL 5 MG/1
5 TABLET ORAL DAILY
COMMUNITY
Start: 2024-12-24

## 2025-02-21 RX ORDER — POTASSIUM CHLORIDE 1500 MG/1
40 TABLET, EXTENDED RELEASE ORAL PRN
Status: DISCONTINUED | OUTPATIENT
Start: 2025-02-21 | End: 2025-02-26 | Stop reason: HOSPADM

## 2025-02-21 RX ORDER — CLONAZEPAM 0.5 MG/1
0.25 TABLET ORAL 2 TIMES DAILY PRN
Status: DISCONTINUED | OUTPATIENT
Start: 2025-02-21 | End: 2025-02-26 | Stop reason: HOSPADM

## 2025-02-21 RX ORDER — LISINOPRIL 5 MG/1
5 TABLET ORAL DAILY
Status: DISCONTINUED | OUTPATIENT
Start: 2025-02-22 | End: 2025-02-26 | Stop reason: HOSPADM

## 2025-02-21 RX ORDER — SENNOSIDES A AND B 8.6 MG/1
1 TABLET, FILM COATED ORAL 2 TIMES DAILY
Status: DISCONTINUED | OUTPATIENT
Start: 2025-02-21 | End: 2025-02-26 | Stop reason: HOSPADM

## 2025-02-21 RX ORDER — IOPAMIDOL 755 MG/ML
80 INJECTION, SOLUTION INTRAVASCULAR
Status: COMPLETED | OUTPATIENT
Start: 2025-02-21 | End: 2025-02-21

## 2025-02-21 RX ORDER — HEPARIN SODIUM 1000 [USP'U]/ML
40 INJECTION, SOLUTION INTRAVENOUS; SUBCUTANEOUS PRN
Status: DISCONTINUED | OUTPATIENT
Start: 2025-02-21 | End: 2025-02-23

## 2025-02-21 RX ORDER — TRAZODONE HYDROCHLORIDE 50 MG/1
50 TABLET ORAL NIGHTLY
Status: DISCONTINUED | OUTPATIENT
Start: 2025-02-21 | End: 2025-02-26 | Stop reason: HOSPADM

## 2025-02-21 RX ORDER — ONDANSETRON 2 MG/ML
4 INJECTION INTRAMUSCULAR; INTRAVENOUS EVERY 6 HOURS PRN
Status: DISCONTINUED | OUTPATIENT
Start: 2025-02-21 | End: 2025-02-26 | Stop reason: HOSPADM

## 2025-02-21 RX ORDER — OMEPRAZOLE 20 MG/1
20 CAPSULE, DELAYED RELEASE ORAL DAILY
COMMUNITY

## 2025-02-21 RX ORDER — HEPARIN SODIUM 10000 [USP'U]/100ML
5-30 INJECTION, SOLUTION INTRAVENOUS CONTINUOUS
Status: DISPENSED | OUTPATIENT
Start: 2025-02-21 | End: 2025-02-22

## 2025-02-21 RX ORDER — ACETAMINOPHEN 650 MG/1
650 SUPPOSITORY RECTAL EVERY 6 HOURS PRN
Status: DISCONTINUED | OUTPATIENT
Start: 2025-02-21 | End: 2025-02-26 | Stop reason: HOSPADM

## 2025-02-21 RX ORDER — ACETAMINOPHEN 325 MG/1
650 TABLET ORAL EVERY 6 HOURS PRN
Status: DISCONTINUED | OUTPATIENT
Start: 2025-02-21 | End: 2025-02-26 | Stop reason: HOSPADM

## 2025-02-21 RX ORDER — ATORVASTATIN CALCIUM 40 MG/1
40 TABLET, FILM COATED ORAL DAILY
Status: DISCONTINUED | OUTPATIENT
Start: 2025-02-22 | End: 2025-02-26 | Stop reason: HOSPADM

## 2025-02-21 RX ORDER — HEPARIN SODIUM 1000 [USP'U]/ML
80 INJECTION, SOLUTION INTRAVENOUS; SUBCUTANEOUS PRN
Status: DISCONTINUED | OUTPATIENT
Start: 2025-02-21 | End: 2025-02-23

## 2025-02-21 RX ORDER — CETIRIZINE HYDROCHLORIDE 10 MG/1
10 TABLET ORAL DAILY
Status: DISCONTINUED | OUTPATIENT
Start: 2025-02-22 | End: 2025-02-26 | Stop reason: HOSPADM

## 2025-02-21 RX ORDER — PANTOPRAZOLE SODIUM 40 MG/1
40 TABLET, DELAYED RELEASE ORAL
Status: DISCONTINUED | OUTPATIENT
Start: 2025-02-22 | End: 2025-02-26 | Stop reason: HOSPADM

## 2025-02-21 RX ORDER — DOCUSATE SODIUM 100 MG/1
100 CAPSULE, LIQUID FILLED ORAL 2 TIMES DAILY
Status: DISCONTINUED | OUTPATIENT
Start: 2025-02-21 | End: 2025-02-26 | Stop reason: HOSPADM

## 2025-02-21 RX ORDER — POLYETHYLENE GLYCOL 3350 17 G/17G
17 POWDER, FOR SOLUTION ORAL DAILY PRN
Status: DISCONTINUED | OUTPATIENT
Start: 2025-02-21 | End: 2025-02-26 | Stop reason: HOSPADM

## 2025-02-21 RX ORDER — SODIUM CHLORIDE 9 MG/ML
INJECTION, SOLUTION INTRAVENOUS PRN
Status: DISCONTINUED | OUTPATIENT
Start: 2025-02-21 | End: 2025-02-26 | Stop reason: HOSPADM

## 2025-02-21 RX ADMIN — TRAZODONE HYDROCHLORIDE 50 MG: 50 TABLET ORAL at 22:50

## 2025-02-21 RX ADMIN — IOPAMIDOL 80 ML: 755 INJECTION, SOLUTION INTRAVENOUS at 15:55

## 2025-02-21 RX ADMIN — HEPARIN SODIUM 18 UNITS/KG/HR: 10000 INJECTION, SOLUTION INTRAVENOUS at 17:58

## 2025-02-21 RX ADMIN — DOCUSATE SODIUM 100 MG: 100 CAPSULE, LIQUID FILLED ORAL at 22:50

## 2025-02-21 RX ADMIN — HEPARIN SODIUM 6900 UNITS: 1000 INJECTION INTRAVENOUS; SUBCUTANEOUS at 17:57

## 2025-02-21 RX ADMIN — SENNOSIDES 8.6 MG: 8.6 TABLET, COATED ORAL at 22:50

## 2025-02-21 RX ADMIN — LAMOTRIGINE 150 MG: 25 TABLET ORAL at 22:50

## 2025-02-21 ASSESSMENT — PAIN - FUNCTIONAL ASSESSMENT
PAIN_FUNCTIONAL_ASSESSMENT: NONE - DENIES PAIN
PAIN_FUNCTIONAL_ASSESSMENT: NONE - DENIES PAIN
PAIN_FUNCTIONAL_ASSESSMENT: 0-10
PAIN_FUNCTIONAL_ASSESSMENT: NONE - DENIES PAIN
PAIN_FUNCTIONAL_ASSESSMENT: 0-10
PAIN_FUNCTIONAL_ASSESSMENT: NONE - DENIES PAIN

## 2025-02-21 ASSESSMENT — LIFESTYLE VARIABLES
HOW MANY STANDARD DRINKS CONTAINING ALCOHOL DO YOU HAVE ON A TYPICAL DAY: PATIENT DOES NOT DRINK
HOW OFTEN DO YOU HAVE A DRINK CONTAINING ALCOHOL: NEVER

## 2025-02-21 ASSESSMENT — PAIN SCALES - GENERAL
PAINLEVEL_OUTOF10: 6
PAINLEVEL_OUTOF10: 5

## 2025-02-21 NOTE — PROGRESS NOTES
Pharmacy Medication History Note    List of current medications patient is taking is complete.    Source of information: patient and surescripts    Changes made to medication list:  Medications removed (include reason, ex. therapy complete or physician discontinued):  Calcium carbonate   Norco    Medications added/doses adjusted:  Atorvastatin 20 mg daily changed to 40 mg daily  Clonazepam 1 mg hs prn changed to 0.5 mg 1/2-1 tablet prn   Added Equate stool softener   Changed fluoxetine 60 mg once daily to 40 mg once daily   Added Linzess   Added lisinopril   Added omeprazole   Changed trazodone 50 mg 2 tablets hs to 1 tablet hs    Other notes (ex. Recent course of antibiotics, Coumadin dosing):  The patient mentioned she never took fluoxetine 60 mg once daily. She was previously on 20 mg once daily.  Denies use of other OTC or herbal medications.    Allergies reviewed    Electronically signed by Savannah Ibrahim RPH on 2/21/2025 at 5:38 PM

## 2025-02-21 NOTE — ED PROVIDER NOTES
[facility] EMERGENCY DEPARTMENT  EMERGENCY DEPARTMENT ENCOUNTER          Pt Name: Gale Kelsey  MRN: [id number]  Birthdate 1960  Date of evaluation: 2/21/2025  Resident Physician: Dexter Luna MD EM Resident PGY-3  Attending Physician: Jasper Katz DO      CHIEF COMPLAINT       Chief Complaint   Patient presents with    Shortness of Breath         HISTORY OF PRESENT ILLNESS    HPI  Gale Kelsey is a 64 y.o. female who presents to the emergency department from home, by private vehicle for evaluation of shortness of breath.    Patient with recent surgery by Dr. Sutton for cervical spine with recent I&D for postoperative surgical seroma.  Patient states that she has had improvement of her dysphagia and swelling of her neck after the second operation.  States that today she was in the shower and came out and had sudden onset shortness of breath with shaking.  Patient states that she has previously had anxiety but this felt different as she had no triggering event.  Patient states now after sitting in the ED for 1.5 hours she feels much better.  She denies any active chest pain.  Denies any fall or injury.  Patient is wearing her cervical collar as instructed by her surgeon.      The patient has no other acute complaints at this time.    ROS negative except as stated above.    PAST MEDICAL AND SURGICAL HISTORY     Past Medical History:   Diagnosis Date    Anxiety     Depression     Seizures (HCC)      Past Surgical History:   Procedure Laterality Date    CAPSULE ENDOSCOPY      CYST REMOVAL      right ribcage    KNEE CARTILAGE SURGERY Left 03/01/2024    KNEE SURGERY      SPINE SURGERY      SPINE SURGERY N/A 2/14/2025    CERVICAL SPINE INCISION AND DRAINAGE performed by Bebeto Sutton MD at [facility] OR    SPLENECTOMY      TUMOR REMOVAL  1974    stomach    WRIST GANGLION EXCISION      right wrist         MEDICATIONS     Current Facility-Administered Medications:     heparin (porcine)

## 2025-02-21 NOTE — H&P
Internal Medicine Resident History & Physical      Patient: Gale Kelsey  : 1960  MRN: [id number]     Acct: [id number]    PCP: Alex Tomas MD  Date of Admission: 2025  Date of Service: Pt seen/examined on 25  and Admitted to Inpatient with expected LOS greater than two midnights due to medical therapy.     Hospital Problems             Last Modified POA    * (Principal) Acute pulmonary embolism, unspecified pulmonary embolism type, unspecified whether acute cor pulmonale present (HCC) 2025 Yes       ASSESSMENT AND PLAN     Active Problems:  Acute pulmonary embolism, provoked.  In setting of recent surgical intervention, underwent ACDF C4-C6 on 2/10/2025, complicated by postoperative cervical spine seroma requiring I&D.  CTA chest shows pulmonary arterial filling defects in the distal right main pulmonary artery extending into the right lower lobar branches.  There is no evidence of right heart strain on CTA chest.  Heparin started in ED.  Hemodynamically stable on room air at time of admission. No growth on culture, dyspnea, EKG on presentation shows sinus rhythm, rate 96, AR interval 136, QRS 66, QTc 437, likely pathologic Q waves in lead III, isolated ST elevation in lead II.  Troponin negative.  Continue heparin, transition to DOAC prior to discharge  TTE ordered, follow results  Continuous pulse oximetry, continuous telemetry  Further recommendations pending results and clinical course  ACDF C4-C6 on 2/10/2025, complicated by postoperative cervical spine seroma requiring I&D.    Dr. Sutton following, appreciate recommendations  Per patient, c-collar to stay in place until 3/4/2025  Continue with cyclobenzaprine  PT OT consulted  Bowel regimen in setting of recent narcotic use and chronic constipation    Chronic Problems:  HTN.   Continue home lisinopril with hold parameters  HLD.  Continue home statin.  Depression, with anxiety.    Continue home Klonopin,

## 2025-02-21 NOTE — ED NOTES
Pt arrives to ED from home with c/o sob and shaking  Pt states she had two different surgeries last week by Dr Sutton  She states this morning she woke up with sternal pain, sob and a shaking she cannot get to stop   Pt arrives with c collar in place, is ambulatory from wheelchair to bed

## 2025-02-21 NOTE — ED NOTES
ED to inpatient nurses report      Chief Complaint:  Chief Complaint   Patient presents with    Shortness of Breath     Present to ED from: home    MOA:     LOC: alert and orientated to name, place, date  Mobility: Requires assistance * 1  Oxygen Baseline: room air    Current needs required: room air     Code Status:   Prior    What abnormal results were found and what did you give/do to treat them? Heparin   Any procedures or intervention occur? NA    Mental Status:  Level of Consciousness: Alert (0)    Psych Assessment:        Vitals:  Patient Vitals for the past 24 hrs:   BP Temp Temp src Pulse Resp SpO2 Weight   02/21/25 1744 (!) 125/92 -- -- 85 18 95 % --   02/21/25 1718 -- -- -- -- -- -- 86 kg (189 lb 9.5 oz)   02/21/25 1635 (!) 143/92 -- -- 82 18 96 % --   02/21/25 1519 131/75 -- -- 82 20 93 % --   02/21/25 1418 (!) 138/96 -- -- 85 18 93 % --   02/21/25 1255 117/83 98.8 °F (37.1 °C) Oral (!) 110 20 100 % --        LDAs:   Peripheral IV 02/21/25 Right Antecubital (Active)   Site Assessment Clean, dry & intact 02/21/25 1637   Line Status Normal saline locked 02/21/25 1637   Phlebitis Assessment No symptoms 02/21/25 1637   Infiltration Assessment 0 02/21/25 1637   Dressing Status Clean, dry & intact 02/21/25 1637       Ambulatory Status:  No data recorded    Diagnosis:  DISPOSITION Admitted 02/21/2025 05:37:10 PM   Final diagnoses:   Other acute pulmonary embolism without acute cor pulmonale (HCC)   Acute pulmonary embolism, unspecified pulmonary embolism type, unspecified whether acute cor pulmonale present (HCC)        Consults:  IP CONSULT TO ORTHOPEDIC SURGERY     Pain Score:  Pain Assessment  Pain Assessment: None - Denies Pain  Pain Level: 5    C-SSRS:        Sepsis Screening:       Richard Fall Risk:       Swallow Screening        Preferred Language:   English      ALLERGIES     Aspirin-acetaminophen-caffeine, Caffeine, and Percocet [oxycodone-acetaminophen]    SURGICAL HISTORY       Past Surgical History:

## 2025-02-22 ENCOUNTER — APPOINTMENT (OUTPATIENT)
Dept: CT IMAGING | Age: 65
DRG: 176 | End: 2025-02-22
Payer: MEDICARE

## 2025-02-22 ENCOUNTER — APPOINTMENT (OUTPATIENT)
Age: 65
DRG: 176 | End: 2025-02-22
Payer: MEDICARE

## 2025-02-22 LAB
ALBUMIN SERPL BCG-MCNC: 3.6 G/DL (ref 3.5–5.1)
ALP SERPL-CCNC: 80 U/L (ref 38–126)
ALT SERPL W/O P-5'-P-CCNC: 17 U/L (ref 11–66)
ANION GAP SERPL CALC-SCNC: 13 MEQ/L (ref 8–16)
ANION GAP SERPL CALC-SCNC: 17 MEQ/L (ref 8–16)
AST SERPL-CCNC: 18 U/L (ref 5–40)
BASOPHILS ABSOLUTE: 0 THOU/MM3 (ref 0–0.1)
BASOPHILS NFR BLD AUTO: 0.2 %
BILIRUB SERPL-MCNC: 0.5 MG/DL (ref 0.3–1.2)
BUN SERPL-MCNC: 13 MG/DL (ref 7–22)
BUN SERPL-MCNC: 14 MG/DL (ref 7–22)
CALCIUM SERPL-MCNC: 8.8 MG/DL (ref 8.2–9.6)
CALCIUM SERPL-MCNC: 9 MG/DL (ref 8.2–9.6)
CHLORIDE SERPL-SCNC: 101 MEQ/L (ref 98–111)
CHLORIDE SERPL-SCNC: 99 MEQ/L (ref 98–111)
CO2 SERPL-SCNC: 22 MEQ/L (ref 23–33)
CO2 SERPL-SCNC: 24 MEQ/L (ref 23–33)
CREAT SERPL-MCNC: 0.6 MG/DL (ref 0.4–1.2)
CREAT SERPL-MCNC: 0.7 MG/DL (ref 0.4–1.2)
DEPRECATED RDW RBC AUTO: 53.6 FL (ref 35–45)
DEPRECATED RDW RBC AUTO: 55.8 FL (ref 35–45)
ECHO AV CUSP MM: 2.1 CM
ECHO AV PEAK GRADIENT: 7 MMHG
ECHO AV PEAK VELOCITY: 1.3 M/S
ECHO AV VELOCITY RATIO: 0.62
ECHO LA AREA 4C: 13.1 CM2
ECHO LA DIAMETER: 3 CM
ECHO LA MAJOR AXIS: 4.4 CM
ECHO LA VOL MOD A4C: 30 ML (ref 22–52)
ECHO LV EF PHYSICIAN: 60 %
ECHO LV FRACTIONAL SHORTENING: 39 % (ref 28–44)
ECHO LV INTERNAL DIMENSION DIASTOLIC: 4.4 CM (ref 3.9–5.3)
ECHO LV INTERNAL DIMENSION SYSTOLIC: 2.7 CM
ECHO LV IVSD: 0.9 CM (ref 0.6–0.9)
ECHO LV MASS 2D: 137.8 G (ref 67–162)
ECHO LV POSTERIOR WALL DIASTOLIC: 1 CM (ref 0.6–0.9)
ECHO LV RELATIVE WALL THICKNESS RATIO: 0.45
ECHO LVOT PEAK GRADIENT: 2 MMHG
ECHO LVOT PEAK VELOCITY: 0.8 M/S
ECHO MV A VELOCITY: 0.65 M/S
ECHO MV E DECELERATION TIME (DT): 201 MS
ECHO MV E VELOCITY: 0.42 M/S
ECHO MV E/A RATIO: 0.65
ECHO PULMONARY ARTERY END DIASTOLIC PRESSURE: 5 MMHG
ECHO PV MAX VELOCITY: 0.9 M/S
ECHO PV PEAK GRADIENT: 3 MMHG
ECHO PV REGURGITANT MAX VELOCITY: 1.1 M/S
ECHO RV INTERNAL DIMENSION: 1.8 CM
ECHO TV E WAVE: 0.5 M/S
EOSINOPHIL NFR BLD AUTO: 2.3 %
EOSINOPHILS ABSOLUTE: 0.3 THOU/MM3 (ref 0–0.4)
ERYTHROCYTE [DISTWIDTH] IN BLOOD BY AUTOMATED COUNT: 16.6 % (ref 11.5–14.5)
ERYTHROCYTE [DISTWIDTH] IN BLOOD BY AUTOMATED COUNT: 16.7 % (ref 11.5–14.5)
GFR SERPL CREATININE-BSD FRML MDRD: > 90 ML/MIN/1.73M2
GFR SERPL CREATININE-BSD FRML MDRD: > 90 ML/MIN/1.73M2
GLUCOSE BLD STRIP.AUTO-MCNC: 121 MG/DL (ref 70–108)
GLUCOSE BLD STRIP.AUTO-MCNC: 140 MG/DL (ref 70–108)
GLUCOSE SERPL-MCNC: 149 MG/DL (ref 70–108)
GLUCOSE SERPL-MCNC: 95 MG/DL (ref 70–108)
HCT VFR BLD AUTO: 41.8 % (ref 37–47)
HCT VFR BLD AUTO: 42.1 % (ref 37–47)
HEPARIN UNFRACTIONATED: 0.61 U/ML (ref 0.3–0.7)
HEPARIN UNFRACTIONATED: 0.97 U/ML (ref 0.3–0.7)
HEPARIN UNFRACTIONATED: > 2 U/ML (ref 0.3–0.7)
HGB BLD-MCNC: 13.5 GM/DL (ref 12–16)
HGB BLD-MCNC: 13.6 GM/DL (ref 12–16)
IMM GRANULOCYTES # BLD AUTO: 0.05 THOU/MM3 (ref 0–0.07)
IMM GRANULOCYTES NFR BLD AUTO: 0.4 %
LACTATE SERPL-SCNC: 1.6 MMOL/L (ref 0.5–2)
LYMPHOCYTES ABSOLUTE: 3.2 THOU/MM3 (ref 1–4.8)
LYMPHOCYTES NFR BLD AUTO: 26.3 %
MAGNESIUM SERPL-MCNC: 2.2 MG/DL (ref 1.6–2.4)
MCH RBC QN AUTO: 29 PG (ref 26–33)
MCH RBC QN AUTO: 29.2 PG (ref 26–33)
MCHC RBC AUTO-ENTMCNC: 32.1 GM/DL (ref 32.2–35.5)
MCHC RBC AUTO-ENTMCNC: 32.5 GM/DL (ref 32.2–35.5)
MCV RBC AUTO: 89.1 FL (ref 81–99)
MCV RBC AUTO: 91.1 FL (ref 81–99)
MONOCYTES ABSOLUTE: 1.2 THOU/MM3 (ref 0.4–1.3)
MONOCYTES NFR BLD AUTO: 9.9 %
NEUTROPHILS ABSOLUTE: 7.4 THOU/MM3 (ref 1.8–7.7)
NEUTROPHILS NFR BLD AUTO: 60.9 %
NRBC BLD AUTO-RTO: 0 /100 WBC
OSMOLALITY SERPL CALC.SUM OF ELEC: 279.7 MOSMOL/KG (ref 275–300)
PLATELET # BLD AUTO: 340 THOU/MM3 (ref 130–400)
PLATELET # BLD AUTO: 343 THOU/MM3 (ref 130–400)
PMV BLD AUTO: 10.5 FL (ref 9.4–12.4)
PMV BLD AUTO: 10.7 FL (ref 9.4–12.4)
POTASSIUM SERPL-SCNC: 4.4 MEQ/L (ref 3.5–5.2)
POTASSIUM SERPL-SCNC: 4.6 MEQ/L (ref 3.5–5.2)
PROCALCITONIN SERPL IA-MCNC: 0.04 NG/ML (ref 0.01–0.09)
PROLACTIN SERPL-MCNC: 32.5 NG/ML
PROT SERPL-MCNC: 6.3 G/DL (ref 6.1–8)
RBC # BLD AUTO: 4.62 MILL/MM3 (ref 4.2–5.4)
RBC # BLD AUTO: 4.69 MILL/MM3 (ref 4.2–5.4)
SODIUM SERPL-SCNC: 136 MEQ/L (ref 135–145)
SODIUM SERPL-SCNC: 140 MEQ/L (ref 135–145)
WBC # BLD AUTO: 11.4 THOU/MM3 (ref 4.8–10.8)
WBC # BLD AUTO: 12.2 THOU/MM3 (ref 4.8–10.8)

## 2025-02-22 PROCEDURE — 84145 PROCALCITONIN (PCT): CPT

## 2025-02-22 PROCEDURE — 82948 REAGENT STRIP/BLOOD GLUCOSE: CPT

## 2025-02-22 PROCEDURE — 95705 EEG W/O VID 2-12 HR UNMNTR: CPT

## 2025-02-22 PROCEDURE — 83735 ASSAY OF MAGNESIUM: CPT

## 2025-02-22 PROCEDURE — 36415 COLL VENOUS BLD VENIPUNCTURE: CPT

## 2025-02-22 PROCEDURE — 93306 TTE W/DOPPLER COMPLETE: CPT | Performed by: INTERNAL MEDICINE

## 2025-02-22 PROCEDURE — 99233 SBSQ HOSP IP/OBS HIGH 50: CPT | Performed by: INTERNAL MEDICINE

## 2025-02-22 PROCEDURE — 85520 HEPARIN ASSAY: CPT

## 2025-02-22 PROCEDURE — 83605 ASSAY OF LACTIC ACID: CPT

## 2025-02-22 PROCEDURE — 84146 ASSAY OF PROLACTIN: CPT

## 2025-02-22 PROCEDURE — 6370000000 HC RX 637 (ALT 250 FOR IP): Performed by: INTERNAL MEDICINE

## 2025-02-22 PROCEDURE — 2720000010 HC SURG SUPPLY STERILE

## 2025-02-22 PROCEDURE — 6370000000 HC RX 637 (ALT 250 FOR IP)

## 2025-02-22 PROCEDURE — 2060000000 HC ICU INTERMEDIATE R&B

## 2025-02-22 PROCEDURE — 85027 COMPLETE CBC AUTOMATED: CPT

## 2025-02-22 PROCEDURE — 85025 COMPLETE CBC W/AUTO DIFF WBC: CPT

## 2025-02-22 PROCEDURE — 80053 COMPREHEN METABOLIC PANEL: CPT

## 2025-02-22 PROCEDURE — 80175 DRUG SCREEN QUAN LAMOTRIGINE: CPT

## 2025-02-22 PROCEDURE — 93306 TTE W/DOPPLER COMPLETE: CPT

## 2025-02-22 PROCEDURE — 70450 CT HEAD/BRAIN W/O DYE: CPT

## 2025-02-22 RX ORDER — DOXYCYCLINE HYCLATE 100 MG
100 TABLET ORAL EVERY 12 HOURS SCHEDULED
Status: DISCONTINUED | OUTPATIENT
Start: 2025-02-22 | End: 2025-02-24

## 2025-02-22 RX ADMIN — DOXYCYCLINE HYCLATE 100 MG: 100 TABLET, COATED ORAL at 11:43

## 2025-02-22 RX ADMIN — SENNOSIDES 8.6 MG: 8.6 TABLET, COATED ORAL at 23:18

## 2025-02-22 RX ADMIN — LAMOTRIGINE 150 MG: 25 TABLET ORAL at 09:40

## 2025-02-22 RX ADMIN — APIXABAN 10 MG: 5 TABLET, FILM COATED ORAL at 17:57

## 2025-02-22 RX ADMIN — DOCUSATE SODIUM 100 MG: 100 CAPSULE, LIQUID FILLED ORAL at 23:18

## 2025-02-22 RX ADMIN — LISINOPRIL 5 MG: 5 TABLET ORAL at 09:38

## 2025-02-22 RX ADMIN — SENNOSIDES 8.6 MG: 8.6 TABLET, COATED ORAL at 09:39

## 2025-02-22 RX ADMIN — CETIRIZINE HYDROCHLORIDE 10 MG: 10 TABLET, FILM COATED ORAL at 09:39

## 2025-02-22 RX ADMIN — PANTOPRAZOLE SODIUM 40 MG: 40 TABLET, DELAYED RELEASE ORAL at 05:07

## 2025-02-22 RX ADMIN — LAMOTRIGINE 150 MG: 25 TABLET ORAL at 23:18

## 2025-02-22 RX ADMIN — DOXYCYCLINE HYCLATE 100 MG: 100 TABLET, COATED ORAL at 23:18

## 2025-02-22 RX ADMIN — ACETAMINOPHEN 650 MG: 325 TABLET ORAL at 02:27

## 2025-02-22 RX ADMIN — FLUOXETINE HYDROCHLORIDE 40 MG: 20 CAPSULE ORAL at 09:39

## 2025-02-22 RX ADMIN — ATORVASTATIN CALCIUM 40 MG: 40 TABLET, FILM COATED ORAL at 09:40

## 2025-02-22 RX ADMIN — DOCUSATE SODIUM 100 MG: 100 CAPSULE, LIQUID FILLED ORAL at 09:40

## 2025-02-22 ASSESSMENT — PAIN SCALES - WONG BAKER: WONGBAKER_NUMERICALRESPONSE: NO HURT

## 2025-02-22 ASSESSMENT — PAIN SCALES - GENERAL: PAINLEVEL_OUTOF10: 0

## 2025-02-22 NOTE — PROGRESS NOTES
Pharmacy Consult      Gale Kelsey is a 64 y.o. female for whom pharmacy has been consulted to discontinue heparin drip and initiate apixaban for PE.    Patient Active Problem List   Diagnosis    Chest pain, atypical    Pure hypercholesterolemia    Family history of premature CAD    History of chest pain    GERD (gastroesophageal reflux disease)    Abnormal EKG    Status post incision and drainage    Pulmonary embolus (HCC)     Allergies:  Aspirin-acetaminophen-caffeine, Caffeine, and Percocet [oxycodone-acetaminophen]     Recent Labs     02/21/25  1401 02/22/25  0711   CREATININE 0.7 0.6     Ht/Wt:   Ht Readings from Last 1 Encounters:   02/14/25 1.702 m (5' 7\")        Wt Readings from Last 1 Encounters:   02/21/25 86 kg (189 lb 9.5 oz)       Estimated Creatinine Clearance: 110 mL/min (based on SCr of 0.6 mg/dL).    Assessment/Plan:  Start apixaban 10 mg BID x 7 days, followed by 5 mg BID.   Stop heparin drip 2 hours after first dose of apixaban 10 mg given today.    Thank you for the consult.     Gisella Yanez, PharmD, BCPS   2/22/2025  11:50 AM

## 2025-02-22 NOTE — ED NOTES
Phone call placed to MIGUEL Tuttle to approve pt transport to 7k-06. Pt transported in stable condition.

## 2025-02-22 NOTE — PROGRESS NOTES
Hospitalist Progress Note      Patient:  Gale Kelsey 64 y.o. female     Unit/Bed:7-06/006-A    Date of Admission: 2/21/2025      ASSESSMENT AND PLAN    Active Problems    Acute PE on IV heparin  S/p ACDF about 2 weeks ago by dr. Sutton  Episode of confusion with normal blood sugars , and BP, ? Iatrogenic  will lower dose of flexeril and obtain a CT. Currently neuro wise intact  HTN  hold lisinopril as BP is borderline   Hx of Seizures - obtain levels of lamictal   ? Dysphagia  - obtain SLP and aspiration precautions.  CT possible PNA, add PO antibiotics     Chronic conditions (reviewed and stable unless otherwise stated)  HTN,  GERD, Depression hx of seizures on lamictal , HLD        Code status: Full Code     Expected discharge date:  TBD  Disposition: home     ===================================================================    Chief Complaint: Dyspnea and CP    Subjective (past 24 hours):     No CP or SOB, but per nursing team earlier this am got confused and ripped off the IV's.  So I came up ASAP, VSS, blood sugars > 100.  Now back on IV heparin., \" not sure  Why I pulled off the IV's\" , no seizures today , difficulty with eating , and I called and spoke with dr. Sutton, had dysphagia . Will obtain SLP .  Patient currently is alert and oriented      HPI  Gale Kelsey is a 64 y.o. female with PMHx of recent ACDF complicated by seroma requiring I&D, HTN, HLD, depression with anxiety, seizures, neck pain complicated by neuropathy who presents to [facility] with dyspnea, chest wall pain, diaphoresis.  Patient reports that she was doing well earlier today, however experienced sudden onset shortness of breath associated with pain, shaking, diaphoresis shortly after getting out of the shower.  She reports that since starting heparin in the ED, her symptoms have significantly resolved.  Although her symptoms have significantly improved, she does note that she still has difficulty getting

## 2025-02-22 NOTE — PLAN OF CARE
Problem: Discharge Planning  Goal: Discharge to home or other facility with appropriate resources  Outcome: Progressing     Problem: Safety - Adult  Goal: Free from fall injury  Outcome: Progressing  Flowsheets (Taken 2/22/2025 0900)  Free From Fall Injury: Instruct family/caregiver on patient safety   Care plan reviewed with patient.  Patient verbalizes understanding of the plan of care and contribute to goal setting.

## 2025-02-22 NOTE — PROGRESS NOTES
[facility]  OCCUPATIONAL THERAPY MISSED TREATMENT NOTE  [facility] 7K  7K-06/006-A      Date: 2025  Patient Name: Gale Kelsey        CSN: [id number]   : 1960  (64 y.o.)  Gender: female                REASON FOR MISSED TREATMENT:  Pt on hold at this time d/t new PE. Needs 24 hours anti-coag.

## 2025-02-22 NOTE — CONSULTS
Orthopedic Spine Consult  Department of Orthopedic Surgery  Attending: Dr. Bebeto Sutton      Inpatient consult to Orthopedic Surgery  Consult performed by: Elijah Trotter PA-C  Consult ordered by: Dexter Luna MD          Chief Complaint: s/p cervical fusion surgery    HPI: Gale is a 64-year-old female who has been admitted to the hospitalist service for a PE.  She is a patient who has underwent 2 recent cervical spine surgeries in her care.  Her index operation was an ACDF C4-C6 on 2/10/2025 at [facility] which was followed by an anterior neck wound evaluation and evacuation of minimal seroma on 2/14/2025.  She did continue to struggle with postoperative dysphagia.  She reports that yesterday she developed significant chest pain and shortness of breath.  She states that she does feel confused that she is not sure why she is in the hospital.  She also denies any memory of undergoing cervical spine surgery.  She has able to follow directions moves all extremities.  No focal weakness appreciated.  She is only taken Tylenol overnight.  She denies any significant cervical pain or upper extremity or lower extremity radicular symptoms.  She denies any changes to her bowel or bladder control.    Assessment:   S/p ACDF C4-C6 2/10/25 followed by anterior neck I&D 2/14/25; postoperative dysphagia  AMS; defer to primary team  Plan:   Continue [place] collar  Ok to advance activity with therapy and staff  Ok to anticoag for PE  Recommending dysphagia diet    Will continue to follow.    Allergies   Allergen Reactions    Aspirin-Acetaminophen-Caffeine Nausea And Vomiting     excedrin    Caffeine Nausea And Vomiting    Percocet [Oxycodone-Acetaminophen]      Prior to Visit Medications    Medication Sig Taking? Authorizing Provider   linaclotide (LINZESS) 145 MCG capsule Take 1 capsule by mouth at bedtime Yes ProviderPer MD   lisinopril (PRINIVIL;ZESTRIL) 5 MG tablet Take 1 tablet by mouth daily Yes Per Nolan MD

## 2025-02-22 NOTE — FLOWSHEET NOTE
Discussed with patient her issues with swallowing since her neck surgery. She states \"no trouble with drinking water or other liquids, but sometimes it would feel like stuff would get stuck and stay there until I coughed it out.\" Pt states she was not on any type of thickened liquids but was eating \"soft foods\" prior to admission. 3 oz water swallow screen completed and no issues noted during or after pt completed screening. Encouraged primary nurse, Barbra, to notify provider and request diet order change to soft foods. Speech therapist, Michael, updated on findings.

## 2025-02-23 LAB
ANION GAP SERPL CALC-SCNC: 13 MEQ/L (ref 8–16)
BASOPHILS ABSOLUTE: 0 THOU/MM3 (ref 0–0.1)
BASOPHILS NFR BLD AUTO: 0.2 %
BUN SERPL-MCNC: 12 MG/DL (ref 7–22)
CALCIUM SERPL-MCNC: 8.9 MG/DL (ref 8.2–9.6)
CHLORIDE SERPL-SCNC: 100 MEQ/L (ref 98–111)
CO2 SERPL-SCNC: 25 MEQ/L (ref 23–33)
CREAT SERPL-MCNC: 0.6 MG/DL (ref 0.4–1.2)
DEPRECATED RDW RBC AUTO: 59.2 FL (ref 35–45)
EOSINOPHIL NFR BLD AUTO: 2.6 %
EOSINOPHILS ABSOLUTE: 0.3 THOU/MM3 (ref 0–0.4)
ERYTHROCYTE [DISTWIDTH] IN BLOOD BY AUTOMATED COUNT: 17.1 % (ref 11.5–14.5)
GFR SERPL CREATININE-BSD FRML MDRD: > 90 ML/MIN/1.73M2
GLUCOSE SERPL-MCNC: 100 MG/DL (ref 70–108)
HCT VFR BLD AUTO: 43.6 % (ref 37–47)
HGB BLD-MCNC: 13.4 GM/DL (ref 12–16)
IMM GRANULOCYTES # BLD AUTO: 0.04 THOU/MM3 (ref 0–0.07)
IMM GRANULOCYTES NFR BLD AUTO: 0.3 %
LYMPHOCYTES ABSOLUTE: 2.8 THOU/MM3 (ref 1–4.8)
LYMPHOCYTES NFR BLD AUTO: 23.4 %
MAGNESIUM SERPL-MCNC: 2.3 MG/DL (ref 1.6–2.4)
MCH RBC QN AUTO: 29 PG (ref 26–33)
MCHC RBC AUTO-ENTMCNC: 30.7 GM/DL (ref 32.2–35.5)
MCV RBC AUTO: 94.4 FL (ref 81–99)
MONOCYTES ABSOLUTE: 1.5 THOU/MM3 (ref 0.4–1.3)
MONOCYTES NFR BLD AUTO: 12 %
NEUTROPHILS ABSOLUTE: 7.4 THOU/MM3 (ref 1.8–7.7)
NEUTROPHILS NFR BLD AUTO: 61.5 %
NRBC BLD AUTO-RTO: 0 /100 WBC
PLATELET # BLD AUTO: 308 THOU/MM3 (ref 130–400)
PMV BLD AUTO: 10.6 FL (ref 9.4–12.4)
POTASSIUM SERPL-SCNC: 4.7 MEQ/L (ref 3.5–5.2)
RBC # BLD AUTO: 4.62 MILL/MM3 (ref 4.2–5.4)
SODIUM SERPL-SCNC: 138 MEQ/L (ref 135–145)
WBC # BLD AUTO: 12.1 THOU/MM3 (ref 4.8–10.8)

## 2025-02-23 PROCEDURE — 2060000000 HC ICU INTERMEDIATE R&B

## 2025-02-23 PROCEDURE — 99233 SBSQ HOSP IP/OBS HIGH 50: CPT | Performed by: STUDENT IN AN ORGANIZED HEALTH CARE EDUCATION/TRAINING PROGRAM

## 2025-02-23 PROCEDURE — 83735 ASSAY OF MAGNESIUM: CPT

## 2025-02-23 PROCEDURE — 80175 DRUG SCREEN QUAN LAMOTRIGINE: CPT

## 2025-02-23 PROCEDURE — 2500000003 HC RX 250 WO HCPCS

## 2025-02-23 PROCEDURE — 85025 COMPLETE CBC W/AUTO DIFF WBC: CPT

## 2025-02-23 PROCEDURE — 97166 OT EVAL MOD COMPLEX 45 MIN: CPT

## 2025-02-23 PROCEDURE — 6370000000 HC RX 637 (ALT 250 FOR IP)

## 2025-02-23 PROCEDURE — 6370000000 HC RX 637 (ALT 250 FOR IP): Performed by: NURSE PRACTITIONER

## 2025-02-23 PROCEDURE — 99223 1ST HOSP IP/OBS HIGH 75: CPT | Performed by: NURSE PRACTITIONER

## 2025-02-23 PROCEDURE — 6370000000 HC RX 637 (ALT 250 FOR IP): Performed by: INTERNAL MEDICINE

## 2025-02-23 PROCEDURE — 80048 BASIC METABOLIC PNL TOTAL CA: CPT

## 2025-02-23 PROCEDURE — 36415 COLL VENOUS BLD VENIPUNCTURE: CPT

## 2025-02-23 PROCEDURE — 95717 EEG PHYS/QHP 2-12 HR W/O VID: CPT | Performed by: PSYCHIATRY & NEUROLOGY

## 2025-02-23 RX ORDER — LORAZEPAM 2 MG/ML
4 INJECTION INTRAMUSCULAR EVERY 5 MIN PRN
Status: DISCONTINUED | OUTPATIENT
Start: 2025-02-23 | End: 2025-02-26 | Stop reason: HOSPADM

## 2025-02-23 RX ADMIN — LAMOTRIGINE 150 MG: 25 TABLET ORAL at 12:16

## 2025-02-23 RX ADMIN — LAMOTRIGINE 250 MG: 25 TABLET ORAL at 20:02

## 2025-02-23 RX ADMIN — ATORVASTATIN CALCIUM 40 MG: 40 TABLET, FILM COATED ORAL at 09:59

## 2025-02-23 RX ADMIN — APIXABAN 10 MG: 5 TABLET, FILM COATED ORAL at 20:02

## 2025-02-23 RX ADMIN — CETIRIZINE HYDROCHLORIDE 10 MG: 10 TABLET, FILM COATED ORAL at 09:58

## 2025-02-23 RX ADMIN — APIXABAN 10 MG: 5 TABLET, FILM COATED ORAL at 09:58

## 2025-02-23 RX ADMIN — TRAZODONE HYDROCHLORIDE 50 MG: 50 TABLET ORAL at 20:02

## 2025-02-23 RX ADMIN — FLUOXETINE HYDROCHLORIDE 40 MG: 20 CAPSULE ORAL at 09:58

## 2025-02-23 RX ADMIN — SODIUM CHLORIDE, PRESERVATIVE FREE 10 ML: 5 INJECTION INTRAVENOUS at 09:59

## 2025-02-23 RX ADMIN — PANTOPRAZOLE SODIUM 40 MG: 40 TABLET, DELAYED RELEASE ORAL at 06:18

## 2025-02-23 RX ADMIN — SODIUM CHLORIDE, PRESERVATIVE FREE 10 ML: 5 INJECTION INTRAVENOUS at 20:04

## 2025-02-23 RX ADMIN — DOCUSATE SODIUM 100 MG: 100 CAPSULE, LIQUID FILLED ORAL at 20:02

## 2025-02-23 RX ADMIN — SENNOSIDES 8.6 MG: 8.6 TABLET, COATED ORAL at 20:02

## 2025-02-23 RX ADMIN — SENNOSIDES 8.6 MG: 8.6 TABLET, COATED ORAL at 09:59

## 2025-02-23 RX ADMIN — DOCUSATE SODIUM 100 MG: 100 CAPSULE, LIQUID FILLED ORAL at 09:59

## 2025-02-23 NOTE — PLAN OF CARE
Problem: Discharge Planning  Goal: Discharge to home or other facility with appropriate resources  Outcome: Progressing     Problem: Safety - Adult  Goal: Free from fall injury  Outcome: Progressing     Problem: Skin/Tissue Integrity  Goal: Skin integrity remains intact  Description: 1.  Monitor for areas of redness and/or skin breakdown  2.  Assess vascular access sites hourly  3.  Every 4-6 hours minimum:  Change oxygen saturation probe site  4.  Every 4-6 hours:  If on nasal continuous positive airway pressure, respiratory therapy assess nares and determine need for appliance change or resting period  Outcome: Progressing   Care plan reviewed with patient.  Patient verbalize understanding of the plan of care and contribute to goal setting.

## 2025-02-23 NOTE — PROGRESS NOTES
Silvino was in the room with the patient and yelled out that she was having a seizure. When this nurse arrived into the room the patient was siting up in the bed, and was able to communicate and speak with staff. She seemed somewhat foggy in thinking but was alert. Vital signs were obtained and recorded  at this time. A neuro assessment was completed and NIH was 0. The patient was not incontinent at this time. The  said the seizure lasted about 1 minute or so HE said she didn't know where she was at, but did not condone that she has any tonic clonic movents. Resident arrived in short order to the room and ordered a Lamictal level to be drawn.  states when her dose gets adjusted she will have constant problems. Patient was let in the bed with her  at bedside with the bed alarm on. In good sprits but condones being tired.  Maisha with neuro was called and explained what happened states she would round on the patient.

## 2025-02-23 NOTE — FLOWSHEET NOTE
1915- during shift report, pt has new confusion.  Rapid response called.      1919- 124/72-% on 2L NC -98.0 temp.  Erin NP in room.      1919- code stroke called per Erin NP.  Chem 140    1922- Dr Hartman in room, new orders.  Resource nurse in room.  Code stroke cancelled per Erin NP.      1925 - lab in room for lab draw, cbc, cmp, lactic acid, procalcitonin, prolactin, lamictal level.  127/78 hr 96. 94%    1927- order to transfer pt to     1931 - resource nurse and primary nurse preparing to transfer.

## 2025-02-23 NOTE — PROGRESS NOTES
Was doing report with night shift nurse, found the patient shaking with her legs and fist, when I called her name, patient hissed with her mouth as if she was trying to say something.     Patient then became very confused and was unable to state her name or where she was. Rapid response was called. Documentation is completed.     Patient continued to be confused when the team responded. Unable to state who the president is. Had some slurred speech, but CTA was already completed at 1730 today. ECHO was negative. Team determined with her seizure disorder that she needed to be moved to 4A12 to be monitored for seizure activity.

## 2025-02-23 NOTE — SIGNIFICANT EVENT
Rapid response called due to altered mental status. Primary nurse reports that when she walked into the room for bedside shift report patient was shaking with her arms out and appeared to be seizing. Patient has a history of seizure disorder and is on Lamictal which she last had this AM. Patient appears to be in a post ictal state. VS stable. Had a CTH at 1700 this evening due to confusion earlier in the shift which was unremarkable.    Plan:   Transfer to SD  STAT CBC, BMP, LA, Procal, Prolactin, Lamictal level  EEG pending  Seizure precautions  PRN Ativan for seizure activity  Neurology consult    Electronically signed by ÁNGELA Henriquez CNP on 2/23/2025 at 12:05 AM

## 2025-02-23 NOTE — PROGRESS NOTES
[facility]  INPATIENT OCCUPATIONAL THERAPY  [facility] 4A  EVALUATION      Discharge Recommendations: Continue to assess pending progress, Home with assist PRN, Home with Home health OT  Equipment Recommendations: No continue to assess      Time In: 1032  Time Out: 1044  Timed Code Treatment Minutes: 0 Minutes  Minutes: 12          Date: 2025  Patient Name: Gale Kelsey,   Gender: female      MRN: [id number]  : 1960  (64 y.o.)  Referring Practitioner: Amelia Martins MD  Diagnosis: Pulmonary embolus (HCC)  Additional Pertinent Hx: \"Gale Kelsey is a 64 y.o. female with PMHx of recent ACDF complicated by seroma requiring I&D, HTN, HLD, depression with anxiety, seizures, neck pain complicated by neuropathy who presents to [facility] with dyspnea, chest wall pain, diaphoresis.  Patient reports that she was doing well earlier today, however experienced sudden onset shortness of breath associated with pain, shaking, diaphoresis shortly after getting out of the shower.  She reports that since starting heparin in the ED, her symptoms have significantly resolved.  Although her symptoms have significantly improved, she does note that she still has difficulty getting a full breath in.  States that it is not painful, however is unable to \"get a full breath in.\"  States that she has not noted any worsening swelling in her lower extremities, nor has she noticed any pain, erythema or discoloration of the skin in her lower extremities.  States that she has had 2 procedures recently performed, and ACDF(2/10)  followed by an I&D for postoperative seroma.  Reports that she needs to stay in a c-collar until March 3, 2025.  Denies any significant concerns related to the c-collar or her surgical interventions.  denies any fever/chills, recent sick contacts, cough/cold symptoms, vision changes, current chest pain/pressure/tightness, abdominal, change/bowels, skin

## 2025-02-23 NOTE — CONSULTS
Neurology Consult Note    Date:2/23/2025       Room:[address] [place] [address]  Patient Name:Gale Kelsey     YOB: 1960     Age:64 y.o.    Requesting Physician: Ankur Welsh MD     Reason for Consult:  Evaluate for Breakthrough Seizures      Chief Complaint:   Chief Complaint   Patient presents with    Shortness of Breath       Subjective     HPI- Taken From H&P - 2/21/25  History Of Present Illness:  Gale Kelsey is a 64 y.o. female with PMHx of recent ACDF complicated by seroma requiring I&D, HTN, HLD, depression with anxiety, seizures, neck pain complicated by neuropathy who presents to [facility] with dyspnea, chest wall pain, diaphoresis.  Patient reports that she was doing well earlier today, however experienced sudden onset shortness of breath associated with pain, shaking, diaphoresis shortly after getting out of the shower.  She reports that since starting heparin in the ED, her symptoms have significantly resolved.  Although her symptoms have significantly improved, she does note that she still has difficulty getting a full breath in.  States that it is not painful, however is unable to \"get a full breath in.\"  States that she has not noted any worsening swelling in her lower extremities, nor has she noticed any pain, erythema or discoloration of the skin in her lower extremities.  States that she has had 2 procedures recently performed, and ACDF followed by an I&D for postoperative seroma.  Reports that she needs to stay in a c-collar until March 3, 2025.  Denies any significant concerns related to the c-collar or her surgical interventions.  denies any fever/chills, recent sick contacts, cough/cold symptoms, vision changes, current chest pain/pressure/tightness, abdominal, change/bowels, skin changes, or edema.    Additional History Obtained During My Assessment- 2/23/25   Patient stated that she started having seizures 2009. She was initially on gabapentin but it was hard to

## 2025-02-23 NOTE — PROGRESS NOTES
Gale was transferred to  last evening after possible seizure. Alert and oriented this morning. Denies any CP or SOB. Strength 5/5 BUE.    [place] J collar removed at bedside. Steri-strips removed at bedside by myself. Ok to leave collar off. Discussed normal posterior neck pain and shoulder pain after her collar has been removed is normal. May utilize muscle relaxer.     Will continue to follow peripherally. If discharged, patient does have a follow-up scheduled OP with our team on 3/3/25.

## 2025-02-23 NOTE — PROGRESS NOTES
Hospitalist Progress Note  Internal Medicine Resident      Patient: Gale Kelsey 64 y.o. female      Unit/Bed: [facility]12/012-A    Admit Date: 2/21/2025      ASSESSMENT AND PLAN  Active Problems  Acute Pulmonary Embolism, Provoked In setting of recent surgical intervention, underwent ACDF C4-C6 on 2/10/2025, complicated by postoperative cervical spine seroma requiring I&D.  CTA chest shows pulmonary arterial filling defects in the distal right main pulmonary artery extending into the right lower lobar branches.  There is no evidence of right heart strain on CTA chest.  Heparin started in ED.  Hemodynamically stable on room air at time of admission. No growth on culture, dyspnea, EKG on presentation shows sinus rhythm, rate 96, VA interval 136, QRS 66, QTc 437, likely pathologic Q waves in lead III, isolated ST elevation in lead II.  Troponin negative.   Change heparin GTT to Eliquis  TTE with EF of 60 to 65%.  Continue pulse ox and telemetry    S/p ACDF C4-C6 on 2/10/2025 complicated by postoperative cervical spine seroma requiring I&D. Dr. Sutton team following, appreciate recommendations. Per Team, Okay to take off C-collar and discharge, Patient already has follow up appointment scheduled.   PT/OT on board, Appreciate their recommendations    Possible Breakthrough Seizure: Patient has history of Seizure and is on Lamictal 150 mg BID,  Rapid response was called around 7 pm 2/22 due to confusion, Initially Code stroke called and then cancelled, labs were send and neurology was consulted.  Neurology on board, Appreciate their recommendations  CT Head 02/22 without any acute abnormalities. Plan for EEG  Lamictal level pending      Chronic Conditions (reviewed and stable unless otherwise stated)  Hypertension: Continue home lisinopril with hold parameter  Hyperlipidemia: Continue home statin  Depression and anxiety: Will continue home Klonopin, Prozac and trazodone  History of seizures: Continue home

## 2025-02-24 PROBLEM — R56.9 SEIZURE-LIKE ACTIVITY (HCC): Status: ACTIVE | Noted: 2025-02-24

## 2025-02-24 LAB
ANION GAP SERPL CALC-SCNC: 15 MEQ/L (ref 8–16)
BUN SERPL-MCNC: 12 MG/DL (ref 7–22)
CALCIUM SERPL-MCNC: 9.2 MG/DL (ref 8.2–9.6)
CHLORIDE SERPL-SCNC: 98 MEQ/L (ref 98–111)
CO2 SERPL-SCNC: 27 MEQ/L (ref 23–33)
CREAT SERPL-MCNC: 0.7 MG/DL (ref 0.4–1.2)
GFR SERPL CREATININE-BSD FRML MDRD: > 90 ML/MIN/1.73M2
GLUCOSE SERPL-MCNC: 100 MG/DL (ref 70–108)
MAGNESIUM SERPL-MCNC: 2.2 MG/DL (ref 1.6–2.4)
POTASSIUM SERPL-SCNC: 4.5 MEQ/L (ref 3.5–5.2)
SODIUM SERPL-SCNC: 140 MEQ/L (ref 135–145)

## 2025-02-24 PROCEDURE — 95816 EEG AWAKE AND DROWSY: CPT

## 2025-02-24 PROCEDURE — 36415 COLL VENOUS BLD VENIPUNCTURE: CPT

## 2025-02-24 PROCEDURE — 6370000000 HC RX 637 (ALT 250 FOR IP)

## 2025-02-24 PROCEDURE — 80048 BASIC METABOLIC PNL TOTAL CA: CPT

## 2025-02-24 PROCEDURE — 97116 GAIT TRAINING THERAPY: CPT

## 2025-02-24 PROCEDURE — 99233 SBSQ HOSP IP/OBS HIGH 50: CPT | Performed by: STUDENT IN AN ORGANIZED HEALTH CARE EDUCATION/TRAINING PROGRAM

## 2025-02-24 PROCEDURE — 95816 EEG AWAKE AND DROWSY: CPT | Performed by: PSYCHIATRY & NEUROLOGY

## 2025-02-24 PROCEDURE — 2060000000 HC ICU INTERMEDIATE R&B

## 2025-02-24 PROCEDURE — 97161 PT EVAL LOW COMPLEX 20 MIN: CPT

## 2025-02-24 PROCEDURE — 2500000003 HC RX 250 WO HCPCS

## 2025-02-24 PROCEDURE — 6370000000 HC RX 637 (ALT 250 FOR IP): Performed by: NURSE PRACTITIONER

## 2025-02-24 PROCEDURE — 83735 ASSAY OF MAGNESIUM: CPT

## 2025-02-24 PROCEDURE — 92610 EVALUATE SWALLOWING FUNCTION: CPT

## 2025-02-24 PROCEDURE — 6370000000 HC RX 637 (ALT 250 FOR IP): Performed by: INTERNAL MEDICINE

## 2025-02-24 RX ADMIN — APIXABAN 10 MG: 5 TABLET, FILM COATED ORAL at 20:44

## 2025-02-24 RX ADMIN — LAMOTRIGINE 250 MG: 25 TABLET ORAL at 20:44

## 2025-02-24 RX ADMIN — PANTOPRAZOLE SODIUM 40 MG: 40 TABLET, DELAYED RELEASE ORAL at 06:08

## 2025-02-24 RX ADMIN — SENNOSIDES 8.6 MG: 8.6 TABLET, COATED ORAL at 07:57

## 2025-02-24 RX ADMIN — TRAZODONE HYDROCHLORIDE 50 MG: 50 TABLET ORAL at 20:44

## 2025-02-24 RX ADMIN — SENNOSIDES 8.6 MG: 8.6 TABLET, COATED ORAL at 20:44

## 2025-02-24 RX ADMIN — DOCUSATE SODIUM 100 MG: 100 CAPSULE, LIQUID FILLED ORAL at 07:57

## 2025-02-24 RX ADMIN — LAMOTRIGINE 250 MG: 25 TABLET ORAL at 07:57

## 2025-02-24 RX ADMIN — ATORVASTATIN CALCIUM 40 MG: 40 TABLET, FILM COATED ORAL at 07:57

## 2025-02-24 RX ADMIN — DOCUSATE SODIUM 100 MG: 100 CAPSULE, LIQUID FILLED ORAL at 20:44

## 2025-02-24 RX ADMIN — CETIRIZINE HYDROCHLORIDE 10 MG: 10 TABLET, FILM COATED ORAL at 07:58

## 2025-02-24 RX ADMIN — POLYETHYLENE GLYCOL 3350 17 G: 17 POWDER, FOR SOLUTION ORAL at 07:58

## 2025-02-24 RX ADMIN — FLUOXETINE HYDROCHLORIDE 40 MG: 20 CAPSULE ORAL at 07:57

## 2025-02-24 RX ADMIN — APIXABAN 10 MG: 5 TABLET, FILM COATED ORAL at 07:57

## 2025-02-24 RX ADMIN — SODIUM CHLORIDE, PRESERVATIVE FREE 10 ML: 5 INJECTION INTRAVENOUS at 20:45

## 2025-02-24 ASSESSMENT — PAIN SCALES - WONG BAKER
WONGBAKER_NUMERICALRESPONSE: NO HURT
WONGBAKER_NUMERICALRESPONSE: NO HURT

## 2025-02-24 ASSESSMENT — PAIN SCALES - GENERAL
PAINLEVEL_OUTOF10: 0
PAINLEVEL_OUTOF10: 0

## 2025-02-24 NOTE — CARE COORDINATION
02/24/25 1510   Service Assessment   Patient Orientation Alert and Oriented;Person;Place;Situation;Self   Cognition Alert   History Provided By Patient;Medical Record   Primary Caregiver Self   Accompanied By/Relationship Unaccompanied   Support Systems Spouse/Significant Other;Friends/Neighbors   Patient's Healthcare Decision Maker is: Legal Next of Kin   PCP Verified by CM Yes   Last Visit to PCP Within last 3 months   Prior Functional Level Independent in ADLs/IADLs;Bathing;Dressing;Toileting;Cooking;Feeding;Housework;Shopping;Mobility   Current Functional Level Mobility;Shopping;Housework;Cooking;Feeding;Toileting;Dressing;Bathing;Independent in ADLs/IADLs   Can patient return to prior living arrangement Yes   Ability to make needs known: Good   Family able to assist with home care needs: Yes   Would you like for me to discuss the discharge plan with any other family members/significant others, and if so, who? Yes   Financial Resources Medicare   Community Resources None   CM/SW Referral ADLs/IADLs   Social/Functional History   Active  Yes   Occupation On disability   Discharge Planning   Type of Residence House   Living Arrangements Spouse/Significant Other   Current Services Prior To Admission Durable Medical Equipment   Current DME Prior to Arrival Walker;Cane;Crutches;Other (Comment)  (reacher)   Potential Assistance Needed Home Care   DME Ordered? No   Potential Assistance Purchasing Medications No   Type of Home Care Services None   Patient expects to be discharged to: House   History of falls? 0   Services At/After Discharge   Transition of Care Consult (CM Consult) Home Health   Internal Home Health No   Reason Outside Agency Chosen Patient already serviced by other home care/hospice agency   Services At/After Discharge Home Health;Nursing services   Confirm Follow Up Transport Family   Condition of Participation: Discharge Planning   The Plan for Transition of Care is related to the following

## 2025-02-24 NOTE — PROGRESS NOTES
[facility]  SPEECH THERAPY  [facility] NEUROSCIENCES 4A  Clinical Swallow Evaluation    Discharge Recommendations: No additional ST services recommended  DIET ORDER RECOMMENDATIONS AFTER EVALUATION: Regular, thin liquids   Strategies:  Full Upright Position, Small Bite/Sip, and Alternate Solids and Liquids     SLP Individual Minutes  Time In: 911  Time Out: 919  Minutes: 8  Timed Code Treatment Minutes: 0 Minutes       Date: 2025  Patient Name: Gale Kelsey      CSN: [id number]   : 1960  (64 y.o.)  Gender: female   Referring Physician:  Mendoza Al MD    Diagnosis: Pulmonary embolus (HCC)    History of Present Illness/Injury: Patient admitted to [facility] for above dx. Per chart review, \"'' Gale Kelsey is a 64 y.o. female with PMHx of recent ACDF complicated by seroma requiring I&D, HTN, HLD, depression with anxiety, seizures, neck pain complicated by neuropathy who presents to [facility] with dyspnea, chest wall pain, diaphoresis.  Patient reports that she was doing well earlier today, however experienced sudden onset shortness of breath associated with pain, shaking, diaphoresis shortly after getting out of the shower.  She reports that since starting heparin in the ED, her symptoms have significantly resolved.  Although her symptoms have significantly improved, she does note that she still has difficulty getting a full breath in.  States that it is not painful, however is unable to \"get a full breath in.\"  States that she has not noted any worsening swelling in her lower extremities, nor has she noticed any pain, erythema or discoloration of the skin in her lower extremities.  States that she has had 2 procedures recently performed, and ACDF followed by an I&D for postoperative seroma.  Reports that she needs to stay in a c-collar until March 3, 2025.  Denies any significant concerns related to the c-collar or her surgical interventions.  denies any fever/chills,

## 2025-02-24 NOTE — PROGRESS NOTES
Advance Care Planning     Advance Care Planning Inpatient Note  [facility] Department    Today's Date: 2/24/2025  Unit: [facility] 4A    Received request from IDT Member.  Upon review of chart and communication with care team, patient's decision making abilities are not in question.. Patient was/were present in the room during visit.    Goals of ACP Conversation:  Discuss advance care planning documents  Facilitate a discussion related to patient's goals of care as they align with the patient's values and beliefs.    Health Care Decision Makers:       Summary:  No Decision Maker named by patient at this time    Advance Care Planning Documents (Patient Wishes):  None     Assessment:  Patient, Gale, shared about her current health situation and the way she can feel her body calming down. After much trauma in her life, she shared about the way she feels in tune with her body and expressed gratitude for her current situation, adding \"some have it worse.\"  provided education regarding advance directives and patient opted to complete the paperwork at another time after she has talked to loved ones.  team will remain available to support patient/family, PRN.       Interventions:  Provided education on documents for clarity and greater understanding  Discussed and provided education on state decision maker hierarchy  Encouraged ongoing ACP conversation with future decision makers and loved ones    Care Preferences Communicated:   No    Outcomes/Plan:  ACP Discussion: Postponed    Electronically signed by Chaplain Agusto on 2/24/2025 at 4:15 PM

## 2025-02-24 NOTE — PROGRESS NOTES
[facility]  Neurodiagnostic Laboratory Technician Worksheet  [facility] NEUROSCIENCES 4A  EEG Date: 2025    Name: Gale Kelsey  : 1960  Age: 64 y.o.  Sex: female  MRN: [id number]  CSN: [id number]    Ordering Provider:  MARCELLO haynes      EEG Number: [id number]    Time In: Time In: 1002 (25)  Time Out: Time Out: 1022 (2025)  Total Treatment Time: Minutes: 20    Clinical History: confused, tremors,  s/p ACDF C4 TO C6, and I&D , PE,  patient feels better today,  not confused,  answers questions appropriatly,  hx seizures,      Past Medical History:       Diagnosis Date    Anxiety     Depression     Seizures (HCC)        Medications:   Prior to Admission medications    Medication Sig Start Date End Date Taking? Authorizing Provider   linaclotide (LINZESS) 145 MCG capsule Take 1 capsule by mouth at bedtime   Yes Provider, MD Per   lisinopril (PRINIVIL;ZESTRIL) 5 MG tablet Take 1 tablet by mouth daily 24  Yes Provider, Historical, MD   omeprazole (PRILOSEC) 20 MG delayed release capsule Take 1 capsule by mouth Daily   Yes Provider, Historical, MD   EQUATE STOOL SOFTENER 100 MG capsule Take 1 capsule by mouth 2 times daily 2/10/25  Yes Provider, MD Per   cyclobenzaprine (FLEXERIL) 10 MG tablet Take 1 tablet by mouth 3 times daily as needed for Muscle spasms 25 Yes Brice Cox PA-C   albuterol sulfate HFA (PROVENTIL HFA) 108 (90 Base) MCG/ACT inhaler Inhale 2 puffs into the lungs every 6 hours as needed for Wheezing 23  Yes Lily Bruner, APRN - CNP   lamoTRIgine (LAMICTAL) 100 MG tablet TAKE 2 & 1/2 (TWO & ONE-HALF) TABLETS BY MOUTH TWICE DAILY 22  Yes Sejal Monroy MD   loratadine (CLARITIN) 10 MG tablet daily as needed (allergies) 3/15/21  Yes Provider, MD Per   atorvastatin (LIPITOR) 40 MG tablet Take 1 tablet by mouth daily   Yes Provider, Historical, MD   traZODone (DESYREL) 50 MG tablet Take 1 tablet by mouth nightly   Yes

## 2025-02-24 NOTE — PROGRESS NOTES
[facility]  INPATIENT PHYSICAL THERAPY  EVALUATION  [facility] NEUROSCIENCES 4A - 4A-12/012-A    Discharge Recommendations: Home with assist PRN  Equipment Recommendations: No               Time In: 0815  Time Out: 08  Timed Code Treatment Minutes: 8 Minutes  Minutes: 13          Date: 2025  Patient Name: Gale Kelsey,  Gender:  female        MRN: [id number]  : 1960  (64 y.o.)      Referring Practitioner: Amelia Martins MD  Diagnosis: Pulmonary embolus (HCC)  Additional Pertinent Hx: 64 y.o. female with PMHx of recent ACDF complicated by seroma requiring I&D, HTN, HLD, depression with anxiety, seizures, neck pain complicated by neuropathy who presents to [facility] with dyspnea, chest wall pain, diaphoresis.  Patient reports that she was doing well earlier today, however experienced sudden onset shortness of breath associated with pain, shaking, diaphoresis shortly after getting out of the shower.  She reports that since starting heparin in the ED, her symptoms have significantly resolved.  Although her symptoms have significantly improved, she does note that she still has difficulty getting a full breath in.  States that it is not painful, however is unable to \"get a full breath in.\"  States that she has not noted any worsening swelling in her lower extremities, nor has she noticed any pain, erythema or discoloration of the skin in her lower extremities.  States that she has had 2 procedures recently performed, and ACDF followed by an I&D for postoperative seroma.  Reports that she needs to stay in a c-collar until March 3, 2025.  Denies any significant concerns related to the c-collar or her surgical interventions.  denies any fever/chills, recent sick contacts, cough/cold symptoms, vision changes, current chest pain/pressure/tightness, abdominal, change/bowels, skin changes, or edema''. Per [facility] [facility] collar removed at bedside. Steri-strips removed

## 2025-02-25 ENCOUNTER — APPOINTMENT (OUTPATIENT)
Dept: GENERAL RADIOLOGY | Age: 65
DRG: 176 | End: 2025-02-25
Payer: MEDICARE

## 2025-02-25 PROBLEM — Z98.1 STATUS POST CERVICAL SPINAL FUSION: Status: ACTIVE | Noted: 2025-02-25

## 2025-02-25 PROBLEM — Z98.890 STATUS POST SURGERY: Status: ACTIVE | Noted: 2025-02-25

## 2025-02-25 LAB
ALBUMIN SERPL BCG-MCNC: 3.6 G/DL (ref 3.4–4.9)
ALP SERPL-CCNC: 75 U/L (ref 35–104)
ALT SERPL W/O P-5'-P-CCNC: 15 U/L (ref 10–35)
ANION GAP SERPL CALC-SCNC: 12 MEQ/L (ref 8–16)
AST SERPL-CCNC: 20 U/L (ref 10–35)
BASOPHILS ABSOLUTE: 0 THOU/MM3 (ref 0–0.1)
BASOPHILS NFR BLD AUTO: 0.3 %
BILIRUB CONJ SERPL-MCNC: 0.3 MG/DL (ref 0–0.2)
BILIRUB SERPL-MCNC: 0.8 MG/DL (ref 0.3–1.2)
BUN SERPL-MCNC: 12 MG/DL (ref 8–23)
CALCIUM SERPL-MCNC: 8.7 MG/DL (ref 8.2–9.6)
CHLORIDE SERPL-SCNC: 100 MEQ/L (ref 98–111)
CO2 SERPL-SCNC: 26 MEQ/L (ref 22–29)
CREAT SERPL-MCNC: 0.9 MG/DL (ref 0.5–0.9)
DEPRECATED RDW RBC AUTO: 53.1 FL (ref 35–45)
EKG ATRIAL RATE: 95 BPM
EKG P AXIS: 40 DEGREES
EKG P-R INTERVAL: 138 MS
EKG Q-T INTERVAL: 360 MS
EKG QRS DURATION: 72 MS
EKG QTC CALCULATION (BAZETT): 452 MS
EKG R AXIS: 31 DEGREES
EKG T AXIS: 44 DEGREES
EKG VENTRICULAR RATE: 95 BPM
EOSINOPHIL NFR BLD AUTO: 1.3 %
EOSINOPHILS ABSOLUTE: 0.1 THOU/MM3 (ref 0–0.4)
ERYTHROCYTE [DISTWIDTH] IN BLOOD BY AUTOMATED COUNT: 16.3 % (ref 11.5–14.5)
GFR SERPL CREATININE-BSD FRML MDRD: 72 ML/MIN/1.73M2
GLUCOSE SERPL-MCNC: 115 MG/DL (ref 70–108)
HCT VFR BLD AUTO: 37.8 % (ref 37–47)
HGB BLD-MCNC: 12.3 GM/DL (ref 12–16)
IMM GRANULOCYTES # BLD AUTO: 0.05 THOU/MM3 (ref 0–0.07)
IMM GRANULOCYTES NFR BLD AUTO: 0.5 %
LAMOTRIGINE SERPL-MCNC: 5.9 UG/ML (ref 3–15)
LAMOTRIGINE SERPL-MCNC: 6.4 UG/ML (ref 3–15)
LAMOTRIGINE SERPL-MCNC: 7.2 UG/ML (ref 3–15)
LYMPHOCYTES ABSOLUTE: 0.7 THOU/MM3 (ref 1–4.8)
LYMPHOCYTES NFR BLD AUTO: 7.4 %
MCH RBC QN AUTO: 29.1 PG (ref 26–33)
MCHC RBC AUTO-ENTMCNC: 32.5 GM/DL (ref 32.2–35.5)
MCV RBC AUTO: 89.6 FL (ref 81–99)
MONOCYTES ABSOLUTE: 1.3 THOU/MM3 (ref 0.4–1.3)
MONOCYTES NFR BLD AUTO: 12.7 %
NEUTROPHILS ABSOLUTE: 7.9 THOU/MM3 (ref 1.8–7.7)
NEUTROPHILS NFR BLD AUTO: 77.8 %
NRBC BLD AUTO-RTO: 0 /100 WBC
PLATELET # BLD AUTO: 307 THOU/MM3 (ref 130–400)
PMV BLD AUTO: 10.7 FL (ref 9.4–12.4)
POTASSIUM SERPL-SCNC: 4.4 MEQ/L (ref 3.5–5.2)
PROT SERPL-MCNC: 6.4 G/DL (ref 6.4–8.3)
RBC # BLD AUTO: 4.22 MILL/MM3 (ref 4.2–5.4)
SODIUM SERPL-SCNC: 138 MEQ/L (ref 135–145)
WBC # BLD AUTO: 10.1 THOU/MM3 (ref 4.8–10.8)

## 2025-02-25 PROCEDURE — 87631 RESP VIRUS 3-5 TARGETS: CPT

## 2025-02-25 PROCEDURE — 93005 ELECTROCARDIOGRAM TRACING: CPT | Performed by: STUDENT IN AN ORGANIZED HEALTH CARE EDUCATION/TRAINING PROGRAM

## 2025-02-25 PROCEDURE — 97535 SELF CARE MNGMENT TRAINING: CPT

## 2025-02-25 PROCEDURE — 6370000000 HC RX 637 (ALT 250 FOR IP)

## 2025-02-25 PROCEDURE — 87077 CULTURE AEROBIC IDENTIFY: CPT

## 2025-02-25 PROCEDURE — 6370000000 HC RX 637 (ALT 250 FOR IP): Performed by: NURSE PRACTITIONER

## 2025-02-25 PROCEDURE — 87070 CULTURE OTHR SPECIMN AEROBIC: CPT

## 2025-02-25 PROCEDURE — 36415 COLL VENOUS BLD VENIPUNCTURE: CPT

## 2025-02-25 PROCEDURE — 82248 BILIRUBIN DIRECT: CPT

## 2025-02-25 PROCEDURE — 87581 M.PNEUMON DNA AMP PROBE: CPT

## 2025-02-25 PROCEDURE — 2060000000 HC ICU INTERMEDIATE R&B

## 2025-02-25 PROCEDURE — 71046 X-RAY EXAM CHEST 2 VIEWS: CPT

## 2025-02-25 PROCEDURE — 87486 CHLMYD PNEUM DNA AMP PROBE: CPT

## 2025-02-25 PROCEDURE — 87798 DETECT AGENT NOS DNA AMP: CPT

## 2025-02-25 PROCEDURE — 2500000003 HC RX 250 WO HCPCS

## 2025-02-25 PROCEDURE — 80053 COMPREHEN METABOLIC PANEL: CPT

## 2025-02-25 PROCEDURE — 6370000000 HC RX 637 (ALT 250 FOR IP): Performed by: INTERNAL MEDICINE

## 2025-02-25 PROCEDURE — 85025 COMPLETE CBC W/AUTO DIFF WBC: CPT

## 2025-02-25 PROCEDURE — 87541 LEGION PNEUMO DNA AMP PROB: CPT

## 2025-02-25 PROCEDURE — 99232 SBSQ HOSP IP/OBS MODERATE 35: CPT | Performed by: STUDENT IN AN ORGANIZED HEALTH CARE EDUCATION/TRAINING PROGRAM

## 2025-02-25 PROCEDURE — 87205 SMEAR GRAM STAIN: CPT

## 2025-02-25 RX ADMIN — FLUOXETINE HYDROCHLORIDE 40 MG: 20 CAPSULE ORAL at 08:35

## 2025-02-25 RX ADMIN — CETIRIZINE HYDROCHLORIDE 10 MG: 10 TABLET, FILM COATED ORAL at 08:36

## 2025-02-25 RX ADMIN — ATORVASTATIN CALCIUM 40 MG: 40 TABLET, FILM COATED ORAL at 08:36

## 2025-02-25 RX ADMIN — ACETAMINOPHEN 650 MG: 325 TABLET ORAL at 19:51

## 2025-02-25 RX ADMIN — PANTOPRAZOLE SODIUM 40 MG: 40 TABLET, DELAYED RELEASE ORAL at 06:10

## 2025-02-25 RX ADMIN — DOCUSATE SODIUM 100 MG: 100 CAPSULE, LIQUID FILLED ORAL at 08:35

## 2025-02-25 RX ADMIN — SENNOSIDES 8.6 MG: 8.6 TABLET, COATED ORAL at 08:35

## 2025-02-25 RX ADMIN — TRAZODONE HYDROCHLORIDE 50 MG: 50 TABLET ORAL at 19:51

## 2025-02-25 RX ADMIN — SODIUM CHLORIDE, PRESERVATIVE FREE 10 ML: 5 INJECTION INTRAVENOUS at 08:38

## 2025-02-25 RX ADMIN — LAMOTRIGINE 250 MG: 25 TABLET ORAL at 08:35

## 2025-02-25 RX ADMIN — LAMOTRIGINE 250 MG: 25 TABLET ORAL at 19:51

## 2025-02-25 RX ADMIN — SENNOSIDES 8.6 MG: 8.6 TABLET, COATED ORAL at 19:51

## 2025-02-25 RX ADMIN — APIXABAN 10 MG: 5 TABLET, FILM COATED ORAL at 08:36

## 2025-02-25 RX ADMIN — SODIUM CHLORIDE, PRESERVATIVE FREE 10 ML: 5 INJECTION INTRAVENOUS at 19:54

## 2025-02-25 RX ADMIN — SODIUM CHLORIDE, PRESERVATIVE FREE 10 ML: 5 INJECTION INTRAVENOUS at 08:36

## 2025-02-25 RX ADMIN — DOCUSATE SODIUM 100 MG: 100 CAPSULE, LIQUID FILLED ORAL at 19:51

## 2025-02-25 RX ADMIN — APIXABAN 10 MG: 5 TABLET, FILM COATED ORAL at 19:52

## 2025-02-25 ASSESSMENT — PAIN SCALES - GENERAL
PAINLEVEL_OUTOF10: 0

## 2025-02-25 ASSESSMENT — PAIN SCALES - WONG BAKER
WONGBAKER_NUMERICALRESPONSE: NO HURT

## 2025-02-25 NOTE — CARE COORDINATION
2/25/25, 3:20 PM EST    Patient goals/plan/ treatment preferences discussed by  and .  Patient goals/plan/ treatment preferences reviewed with patient/ family.  Patient/ family verbalize understanding of discharge plan and are in agreement with goal/plan/treatment preferences.  Understanding was demonstrated using the teach back method.  AVS provided by RN at time of discharge, which includes all necessary medical information pertaining to the patients current course of illness, treatment, post-discharge goals of care, and treatment preferences.     Services At/After Discharge: Home Health and Nursing service       Met with Gale.  She is a potential discharge to home for today.  She asked for sebastian Thomas .  Called their agency and they report she is current with their services.  Spoke with Gale at the agency.  She reports that the patient is current for nursing.  They do not need a new order.  She is able to pull discharge from [facility].

## 2025-02-25 NOTE — PROGRESS NOTES
Hospitalist Progress Note  Internal Medicine Resident      Patient: Gale Kelsey 64 y.o. female      Unit/Bed: [facility]12/012-A    Admit Date: 2/21/2025      ASSESSMENT AND PLAN  Active Problems  Acute Pulmonary Embolism, Provoked In setting of recent surgical intervention, underwent ACDF C4-C6 on 2/10/2025, complicated by postoperative cervical spine seroma requiring I&D.  CTA chest shows pulmonary arterial filling defects in the distal right main pulmonary artery extending into the right lower lobar branches.  There is no evidence of right heart strain on CTA chest.  Heparin started in ED.  Hemodynamically stable on room air at time of admission. No growth on culture, dyspnea, EKG on presentation shows sinus rhythm, rate 96, ME interval 136, QRS 66, QTc 437, likely pathologic Q waves in lead III, isolated ST elevation in lead II.  Troponin negative.   Change heparin GTT to Eliquis  TTE with EF of 60 to 65%.  Continue pulse ox and telemetry    S/p ACDF C4-C6 on 2/10/2025 complicated by postoperative cervical spine seroma requiring I&D. Dr. Sutton team following, appreciate recommendations. Per Team, Okay to take off C-collar and discharge, Patient already has follow up appointment scheduled.   PT/OT on board, Appreciate their recommendations    Possible Breakthrough Seizure: Patient has history of Seizure and is on Lamictal 150 mg BID,  Rapid response was called around 7 pm 2/22 due to confusion, Initially Code stroke called and then cancelled, labs were send and neurology was consulted.  Neurology on board, Appreciate their recommendations  CT Head 02/22 without any acute abnormalities.   Lamictal level pending  EEG was abnormal due to rare left temporal sharp waves       Chronic Conditions (reviewed and stable unless otherwise stated)  Hypertension: Continue home lisinopril with hold parameter  Hyperlipidemia: Continue home statin  Depression and anxiety: Will continue home Klonopin, Prozac and

## 2025-02-25 NOTE — PLAN OF CARE
Care plan reviewed with patient and family.  Patient and family verbalize understanding of the plan of care and contribute to goal setting.   Problem: Discharge Planning  Goal: Discharge to home or other facility with appropriate resources  Outcome: Progressing     Problem: Safety - Adult  Goal: Free from fall injury  2/25/2025 1423 by Deepthi Tarango, RN  Outcome: Progressing  2/25/2025 0211 by Elaine Ibrahim, RN  Outcome: Progressing     Problem: Skin/Tissue Integrity  Goal: Skin integrity remains intact  Description: 1.  Monitor for areas of redness and/or skin breakdown  2.  Assess vascular access sites hourly  3.  Every 4-6 hours minimum:  Change oxygen saturation probe site  4.  Every 4-6 hours:  If on nasal continuous positive airway pressure, respiratory therapy assess nares and determine need for appliance change or resting period  Outcome: Progressing

## 2025-02-25 NOTE — PROGRESS NOTES
Hospitalist Progress Note  Internal Medicine Resident      Patient: Gale Kelsey 64 y.o. female      Unit/Bed: -12/012-A    Admit Date: 2/21/2025      ASSESSMENT AND PLAN  Active Problems  Productive cough: Patient is complaining of cough productive of yellowish-green sputum since this morning associated Tmax of 100.4.  Chest x-ray was normal.  Pneumonia panel was sent, patient was also found to be tachycardic.  Will watch if stable will send home.    Acute Pulmonary Embolism, Provoked In setting of recent surgical intervention, underwent ACDF C4-C6 on 2/10/2025, complicated by postoperative cervical spine seroma requiring I&D.  CTA chest shows pulmonary arterial filling defects in the distal right main pulmonary artery extending into the right lower lobar branches.  There is no evidence of right heart strain on CTA chest.  Heparin started in ED.  Hemodynamically stable on room air at time of admission. No growth on culture, dyspnea, EKG on presentation shows sinus rhythm, rate 96, NE interval 136, QRS 66, QTc 437, likely pathologic Q waves in lead III, isolated ST elevation in lead II.  Troponin negative.   Change heparin GTT to Eliquis  TTE with EF of 60 to 65%.  Continue pulse ox and telemetry    S/p ACDF C4-C6 on 2/10/2025 complicated by postoperative cervical spine seroma requiring I&D. Dr. Sutton team following, appreciate recommendations. Per Team, Okay to take off C-collar and discharge, Patient already has follow up appointment scheduled.   PT/OT recommended home with home assist    Possible Breakthrough Seizure: Patient has history of Seizure and is on Lamictal 150 mg BID,  Rapid response was called around 7 pm 2/22 due to confusion, Initially Code stroke called and then cancelled, labs were send and neurology was consulted.  Neurology on board, Appreciate their recommendations  CT Head 02/22 without any acute abnormalities.   Lamictal level pending  EEG was abnormal due to rare left temporal

## 2025-02-25 NOTE — PROCEDURES
PROCEDURE NOTE  Date: 2/24/2025   Name: Gale Kelsey  YOB: 1960    Procedures    Date: 2/23/25    Referring physician: Dr. Whatley  Patient aged 64 y with question of seizures. EEG done to assess for epileptiform activity.    Introduction  This routine  3  hour 15 min EEG was recorded using the [employer] one band system. Automated seizure detection algorithms were applied.    Description  During the maximal alert state, a well-regulated, symmetric, 4 to 7 Hz frequencies were seen bilaterally admixed with beta activity.  There was intermittent focal slowing. Stage I and stage II sleep were not clearly observed. There were no interictal epileptiform discharges or electrographic seizures.  High impedence was seen as the study progressed.    Activations  Hyperventilation was not performed. Intermittent photic stimulation was not performed     Impression  There is abnormal EEG due to diffuse delta and theta frequencies suggestive of moderate encephalopathy.  The faster beta frequencies are suggestive of medication use.    No epileptiform discharges or seizures were identified. Please note the absence of such activity on this record cannot conclusively rule out an epileptic disorder. If such is still clinically suspected, a repeat study with sleep deprivation and/or prolonged sampling may be helpful.    This was a limited 8 channel EEG, which is prone to artifact and a conventional 10-20 lead EEG may be considered if clinically indicated.     Please note this EEG was meant to screen for emergent condition and is prone to artifact and with some limitations. The interpretation of this EEG result should be taken only with clinical correlation. Ideally regular EEG with full leads should be considered when possible.     Electronically signed by Michele Freeman MD           
PROCEDURE NOTE  Date: 2/25/2025   Name: Gale Kelsey  YOB: 1960    Procedures EKG completed, given to RN          
(TYLENOL) tablet 650 mg  650 mg Oral Q6H PRN Amelia Martins MD   650 mg at 02/22/25 0227    Or    acetaminophen (TYLENOL) suppository 650 mg  650 mg Rectal Q6H PRN Amelia Martins MD        pantoprazole (PROTONIX) tablet 40 mg  40 mg Oral QAM AC Amelia Martins MD   40 mg at 02/24/25 0608    clonazePAM (KLONOPIN) tablet 0.25 mg  0.25 mg Oral BID PRN Amelia Martins MD        FLUoxetine (PROZAC) capsule 40 mg  40 mg Oral Daily Amelia Martins MD   40 mg at 02/24/25 0757    traZODone (DESYREL) tablet 50 mg  50 mg Oral Nightly Amelia Martins MD   50 mg at 02/23/25 2002    cetirizine (ZYRTEC) tablet 10 mg  10 mg Oral Daily Amelia Martins MD   10 mg at 02/24/25 0758    atorvastatin (LIPITOR) tablet 40 mg  40 mg Oral Daily Amelia Martins MD   40 mg at 02/24/25 0757    [Held by provider] lisinopril (PRINIVIL;ZESTRIL) tablet 5 mg  5 mg Oral Daily Amelia Martins MD   5 mg at 02/22/25 0938    docusate sodium (COLACE) capsule 100 mg  100 mg Oral BID Amelia Martins MD   100 mg at 02/24/25 0757    senna (SENOKOT) tablet 8.6 mg  1 tablet Oral BID Amelia Martins MD   8.6 mg at 02/24/25 0757    cyclobenzaprine (FLEXERIL) tablet 10 mg  10 mg Oral TID PRN Amelia Martins MD            Technical Description: This is a 21 channel digital EEG recording with time-locked video. Electrodes were placed in accordance with the 10-20 International System of Electrode Placement. Single lead EKG monitoring as well as temporal electrodes were included.    The patient was not sleep deprived. This recording was obtained during wakefulness and drowsiness.  EEG Description: The dominant background activity during maximal recorded wakefulness consisted of bioccipitally dominant 9-10 Hz, 25-35 uV symmetric, regular activity that was reactive to eye opening. During drowsiness, the background rhythm waxed

## 2025-02-25 NOTE — PROGRESS NOTES
[facility]  [facility] NEUROSCIENCES 4A  Occupational Therapy  Daily Note    Discharge Recommendations: Home with assist as needed  Equipment Recommendations: No continue to assess      Time In: 0938  Time Out: 1001  Timed Code Treatment Minutes: 23 Minutes  Minutes: 23          Date: 2025  Patient Name: Gale Kelsey,   Gender: female      Room: [facility]12/012-A  MRN: [id number]  : 1960  (64 y.o.)  Referring Practitioner: Amelia Martins MD  Diagnosis: Pulmonary embolus (HCC)  Additional Pertinent Hx: \"Gale Kelsey is a 64 y.o. female with PMHx of recent ACDF complicated by seroma requiring I&D, HTN, HLD, depression with anxiety, seizures, neck pain complicated by neuropathy who presents to [facility] with dyspnea, chest wall pain, diaphoresis.  Patient reports that she was doing well earlier today, however experienced sudden onset shortness of breath associated with pain, shaking, diaphoresis shortly after getting out of the shower.  She reports that since starting heparin in the ED, her symptoms have significantly resolved.  Although her symptoms have significantly improved, she does note that she still has difficulty getting a full breath in.  States that it is not painful, however is unable to \"get a full breath in.\"  States that she has not noted any worsening swelling in her lower extremities, nor has she noticed any pain, erythema or discoloration of the skin in her lower extremities.  States that she has had 2 procedures recently performed, and ACDF(2/10)  followed by an I&D for postoperative seroma.  Reports that she needs to stay in a c-collar until March 3, 2025.  Denies any significant concerns related to the c-collar or her surgical interventions.  denies any fever/chills, recent sick contacts, cough/cold symptoms, vision changes, current chest pain/pressure/tightness, abdominal, change/bowels, skin changes, or edemaI\"  Assessment: 1. S/p ACDF C4-C6

## 2025-02-26 VITALS
RESPIRATION RATE: 16 BRPM | OXYGEN SATURATION: 98 % | TEMPERATURE: 99.9 F | WEIGHT: 193.12 LBS | BODY MASS INDEX: 30.25 KG/M2 | SYSTOLIC BLOOD PRESSURE: 102 MMHG | DIASTOLIC BLOOD PRESSURE: 62 MMHG | HEART RATE: 112 BPM

## 2025-02-26 LAB
ACINETOBACTER CALCOACETICUS-BAUMANNII BY PCR: NOT DETECTED
ALBUMIN SERPL BCG-MCNC: 3.7 G/DL (ref 3.4–4.9)
ALP SERPL-CCNC: 73 U/L (ref 35–104)
ALT SERPL W/O P-5'-P-CCNC: 13 U/L (ref 10–35)
ANION GAP SERPL CALC-SCNC: 11 MEQ/L (ref 8–16)
AST SERPL-CCNC: 18 U/L (ref 10–35)
BASOPHILS ABSOLUTE: 0 THOU/MM3 (ref 0–0.1)
BASOPHILS NFR BLD AUTO: 0.6 %
BILIRUB SERPL-MCNC: 0.7 MG/DL (ref 0.3–1.2)
BLACTX-M ISLT/SPM QL: ABNORMAL
BLAIMP ISLT/SPM QL: ABNORMAL
BLAKPC ISLT/SPM QL: ABNORMAL
BLAOXA-48-LIKE ISLT/SPM QL: ABNORMAL
BLAVIM ISLT/SPM QL: ABNORMAL
BUN SERPL-MCNC: 10 MG/DL (ref 8–23)
C PNEUM DNA LOWER RESP QL NAA+NON-PROBE: NOT DETECTED
CALCIUM SERPL-MCNC: 9.1 MG/DL (ref 8.2–9.6)
CHLORIDE SERPL-SCNC: 97 MEQ/L (ref 98–111)
CO2 SERPL-SCNC: 28 MEQ/L (ref 22–29)
CREAT SERPL-MCNC: 0.8 MG/DL (ref 0.5–0.9)
DEPRECATED RDW RBC AUTO: 54.1 FL (ref 35–45)
ENTEROBACTER CLOACAE COMPLEX BY PCR: NOT DETECTED
EOSINOPHIL NFR BLD AUTO: 0.9 %
EOSINOPHILS ABSOLUTE: 0.1 THOU/MM3 (ref 0–0.4)
ERYTHROCYTE [DISTWIDTH] IN BLOOD BY AUTOMATED COUNT: 16.3 % (ref 11.5–14.5)
ESCHERICHIA COLI BY PCR: NOT DETECTED
FLUAV RNA LOWER RESP QL NAA+NON-PROBE: NOT DETECTED
FLUBV RNA LOWER RESP QL NAA+NON-PROBE: NOT DETECTED
GFR SERPL CREATININE-BSD FRML MDRD: 83 ML/MIN/1.73M2
GLUCOSE SERPL-MCNC: 98 MG/DL (ref 74–109)
HADV DNA LOWER RESP QL NAA+NON-PROBE: NOT DETECTED
HAEMOPHILUS INFLUENZAE BY PCR: NOT DETECTED
HCOV RNA LOWER RESP QL NAA+NON-PROBE: NOT DETECTED
HCT VFR BLD AUTO: 39.2 % (ref 37–47)
HGB BLD-MCNC: 12.7 GM/DL (ref 12–16)
HMPV RNA LOWER RESP QL NAA+NON-PROBE: NOT DETECTED
HPIV RNA LOWER RESP QL NAA+NON-PROBE: NOT DETECTED
IMM GRANULOCYTES # BLD AUTO: 0.02 THOU/MM3 (ref 0–0.07)
IMM GRANULOCYTES NFR BLD AUTO: 0.2 %
KLEBSIELLA AEROGENES BY PCR: NOT DETECTED
KLEBSIELLA OXYTOCA BY PCR: NOT DETECTED
KLEBSIELLA PNEUMONIAE GROUP BY PCR: NOT DETECTED
L PNEUMO DNA LOWER RESP QL NAA+NON-PROBE: NOT DETECTED
LYMPHOCYTES ABSOLUTE: 1.7 THOU/MM3 (ref 1–4.8)
LYMPHOCYTES NFR BLD AUTO: 20.4 %
M PNEUMO DNA LOWER RESP QL NAA+NON-PROBE: NOT DETECTED
MCH RBC QN AUTO: 29.2 PG (ref 26–33)
MCHC RBC AUTO-ENTMCNC: 32.4 GM/DL (ref 32.2–35.5)
MCV RBC AUTO: 90.1 FL (ref 81–99)
MONOCYTES ABSOLUTE: 1.7 THOU/MM3 (ref 0.4–1.3)
MONOCYTES NFR BLD AUTO: 20.9 %
MORAXELLA CATARRHALIS BY PCR: NOT DETECTED
NEUTROPHILS ABSOLUTE: 4.7 THOU/MM3 (ref 1.8–7.7)
NEUTROPHILS NFR BLD AUTO: 57 %
NRBC BLD AUTO-RTO: 0 /100 WBC
PLATELET # BLD AUTO: 331 THOU/MM3 (ref 130–400)
PMV BLD AUTO: 10.9 FL (ref 9.4–12.4)
POTASSIUM SERPL-SCNC: 4.5 MEQ/L (ref 3.5–5.2)
PROT SERPL-MCNC: 6.5 G/DL (ref 6.4–8.3)
PROTEUS SPECIES BY PCR: NOT DETECTED
PSEUDOMONAS AERUGINOSA BY PCR: NOT DETECTED
RBC # BLD AUTO: 4.35 MILL/MM3 (ref 4.2–5.4)
RESISTANT GENE MECA/C & MREJ BY PCR: ABNORMAL
RESISTANT GENE NDM BY PCR: ABNORMAL
RSV RNA LOWER RESP QL NAA+NON-PROBE: NOT DETECTED
RV+EV RNA LOWER RESP QL NAA+NON-PROBE: NOT DETECTED
SERRATIA MARCESCENS BY PCR: NOT DETECTED
SODIUM SERPL-SCNC: 136 MEQ/L (ref 135–145)
SOURCE: ABNORMAL
SPECIMEN ACCEPTABILITY: ABNORMAL
STAPH AUREUS BY PCR: NOT DETECTED
STREP AGALACTIAE BY PCR: DETECTED
STREP PNEUMONIAE BY PCR: NOT DETECTED
STREP PYOGENES BY PCR: NOT DETECTED
WBC # BLD AUTO: 8.2 THOU/MM3 (ref 4.8–10.8)

## 2025-02-26 PROCEDURE — 85025 COMPLETE CBC W/AUTO DIFF WBC: CPT

## 2025-02-26 PROCEDURE — 99239 HOSP IP/OBS DSCHRG MGMT >30: CPT | Performed by: STUDENT IN AN ORGANIZED HEALTH CARE EDUCATION/TRAINING PROGRAM

## 2025-02-26 PROCEDURE — 2500000003 HC RX 250 WO HCPCS

## 2025-02-26 PROCEDURE — 6370000000 HC RX 637 (ALT 250 FOR IP): Performed by: NURSE PRACTITIONER

## 2025-02-26 PROCEDURE — 36415 COLL VENOUS BLD VENIPUNCTURE: CPT

## 2025-02-26 PROCEDURE — 80053 COMPREHEN METABOLIC PANEL: CPT

## 2025-02-26 PROCEDURE — 6370000000 HC RX 637 (ALT 250 FOR IP)

## 2025-02-26 PROCEDURE — 6370000000 HC RX 637 (ALT 250 FOR IP): Performed by: INTERNAL MEDICINE

## 2025-02-26 RX ORDER — LAMOTRIGINE 100 MG/1
250 TABLET ORAL 2 TIMES DAILY
Qty: 150 TABLET | Refills: 0 | Status: SHIPPED | OUTPATIENT
Start: 2025-02-26

## 2025-02-26 RX ORDER — LAMOTRIGINE 25 MG/1
250 TABLET ORAL 2 TIMES DAILY
Qty: 30 TABLET | Refills: 3 | Status: SHIPPED | OUTPATIENT
Start: 2025-02-26 | End: 2025-02-26

## 2025-02-26 RX ADMIN — APIXABAN 10 MG: 5 TABLET, FILM COATED ORAL at 09:34

## 2025-02-26 RX ADMIN — ATORVASTATIN CALCIUM 40 MG: 40 TABLET, FILM COATED ORAL at 09:35

## 2025-02-26 RX ADMIN — SODIUM CHLORIDE, PRESERVATIVE FREE 10 ML: 5 INJECTION INTRAVENOUS at 09:36

## 2025-02-26 RX ADMIN — LAMOTRIGINE 250 MG: 25 TABLET ORAL at 09:35

## 2025-02-26 RX ADMIN — DOCUSATE SODIUM 100 MG: 100 CAPSULE, LIQUID FILLED ORAL at 09:35

## 2025-02-26 RX ADMIN — FLUOXETINE HYDROCHLORIDE 40 MG: 20 CAPSULE ORAL at 09:35

## 2025-02-26 RX ADMIN — CETIRIZINE HYDROCHLORIDE 10 MG: 10 TABLET, FILM COATED ORAL at 09:35

## 2025-02-26 RX ADMIN — PANTOPRAZOLE SODIUM 40 MG: 40 TABLET, DELAYED RELEASE ORAL at 03:25

## 2025-02-26 RX ADMIN — SENNOSIDES 8.6 MG: 8.6 TABLET, COATED ORAL at 09:35

## 2025-02-26 ASSESSMENT — PAIN SCALES - WONG BAKER: WONGBAKER_NUMERICALRESPONSE: NO HURT

## 2025-02-26 ASSESSMENT — PAIN SCALES - GENERAL: PAINLEVEL_OUTOF10: 0

## 2025-02-26 NOTE — DISCHARGE INSTRUCTIONS
You have been blood thinner called Eliquis because of blood clot, follow-up with your family physician for duration of Eliquis.  You been also started on antibiotic for pneumonia.  Eliquis is blood thinner if you see blood in the poop or if you had a fall and hit your head please come to the hospital.

## 2025-02-26 NOTE — CARE COORDINATION
2/26/25, 1:19 PM EST    Patient goals/plan/ treatment preferences discussed by  and .  Patient goals/plan/ treatment preferences reviewed with patient/ family.  Patient/ family verbalize understanding of discharge plan and are in agreement with goal/plan/treatment preferences.  Understanding was demonstrated using the teach back method.  AVS provided by RN at time of discharge, which includes all necessary medical information pertaining to the patients current course of illness, treatment, post-discharge goals of care, and treatment preferences.     Services At/After Discharge: None

## 2025-02-26 NOTE — PLAN OF CARE
Problem: Discharge Planning  Goal: Discharge to home or other facility with appropriate resources  2/25/2025 2148 by Patricia Krishnan RN  Outcome: Progressing  Flowsheets (Taken 2/25/2025 2148)  Discharge to home or other facility with appropriate resources: Identify barriers to discharge with patient and caregiver     Problem: Safety - Adult  Goal: Free from fall injury  2/25/2025 2148 by Patricia Krishnan RN  Outcome: Progressing  Free From Fall Injury: Instruct family/caregiver on patient safety     Problem: Skin/Tissue Integrity  Goal: Skin integrity remains intact  Description: 1.  Monitor for areas of redness and/or skin breakdown  2.  Assess vascular access sites hourly  3.  Every 4-6 hours minimum:  Change oxygen saturation probe site  4.  Every 4-6 hours:  If on nasal continuous positive airway pressure, respiratory therapy assess nares and determine need for appliance change or resting period  2/25/2025 2148 by Patricia Krishnan RN  Outcome: Progressing  Flowsheets (Taken 2/25/2025 2148)  Skin Integrity Remains Intact: Monitor for areas of redness and/or skin breakdown     Problem: Pain  Goal: Verbalizes/displays adequate comfort level or baseline comfort level  Outcome: Progressing  Verbalizes/displays adequate comfort level or baseline comfort level:   Encourage patient to monitor pain and request assistance   Assess pain using appropriate pain scale     Problem: Respiratory - Adult  Goal: Achieves optimal ventilation and oxygenation  Outcome: Progressing  Achieves optimal ventilation and oxygenation: Assess for changes in respiratory status   Care plan reviewed with patient.  Patient verbalizes understanding of the plan of care and contributed to goal setting.

## 2025-02-26 NOTE — DISCHARGE SUMMARY
Resident Discharge Summary (Hospitalist)      Patient: Gale Kelsey 64 y.o. female  : 1960  MRN: [id number]   Account: [id number]   Patient's PCP: Alex Tomas MD    Admit Date: 2025   Discharge Date: 2025      Admitting Physician: No admitting provider for patient encounter.  Discharge Physician: Bernardo Mcdonald MD       Discharge Diagnoses:  Productive cough/ Strep Agalactiae PNA: Patient is complaining of cough productive of yellowish-green sputum since this morning associated Tmax of 100.4.  Chest x-ray was normal.  Pneumonia panel was sent, which came back positive for strep. Agalactiae, Will send in with 7 days course of Augmentin. Otherwise, Patient is stable for discharge.     Acute Pulmonary Embolism, Provoked In setting of recent surgical intervention, underwent ACDF C4-C6 on 2/10/2025, complicated by postoperative cervical spine seroma requiring I&D.  CTA chest shows pulmonary arterial filling defects in the distal right main pulmonary artery extending into the right lower lobar branches.  There is no evidence of right heart strain on CTA chest.  Heparin started in ED.  Hemodynamically stable on room air at time of admission. No growth on culture, dyspnea, EKG on presentation shows sinus rhythm, rate 96, CA interval 136, QRS 66, QTc 437, likely pathologic Q waves in lead III, isolated ST elevation in lead II.  Troponin negative.   Change heparin GTT to Eliquis, will be discharged on Eliquis, Follow up with PCP         S/p ACDF C4-C6 on 2/10/2025 complicated by postoperative cervical spine seroma requiring I&D. Dr. Sutton team following, appreciate recommendations. Per Team, Okay to take off C-collar and discharge, Patient already has follow up appointment scheduled.   PT/OT recommended home   Patient to follow up orthospine outpatient.      Possible Breakthrough Seizure: Patient has history of Seizure and is on Lamictal 150 mg BID,  Rapid response was called around 7

## 2025-02-26 NOTE — PLAN OF CARE
Care plan reviewed with patient and family.  Patient and family verbalize understanding of the plan of care and contribute to goal setting.   Problem: Discharge Planning  Goal: Discharge to home or other facility with appropriate resources  2/26/2025 0827 by Deepthi Tarango RN  Outcome: Progressing  2/25/2025 2148 by Patricia Krishnan RN  Outcome: Progressing  Flowsheets (Taken 2/25/2025 2148)  Discharge to home or other facility with appropriate resources: Identify barriers to discharge with patient and caregiver     Problem: Safety - Adult  Goal: Free from fall injury  2/26/2025 0827 by Deepthi Tarango RN  Outcome: Progressing  2/25/2025 2148 by Patricia Krishnan RN  Outcome: Progressing  Flowsheets (Taken 2/22/2025 0900 by Barbra Harding RN)  Free From Fall Injury: Instruct family/caregiver on patient safety     Problem: Skin/Tissue Integrity  Goal: Skin integrity remains intact  Description: 1.  Monitor for areas of redness and/or skin breakdown  2.  Assess vascular access sites hourly  3.  Every 4-6 hours minimum:  Change oxygen saturation probe site  4.  Every 4-6 hours:  If on nasal continuous positive airway pressure, respiratory therapy assess nares and determine need for appliance change or resting period  2/26/2025 0827 by Deepthi Tarango RN  Outcome: Progressing  2/25/2025 2148 by Patricia Krishnan RN  Outcome: Progressing  Flowsheets (Taken 2/25/2025 2148)  Skin Integrity Remains Intact: Monitor for areas of redness and/or skin breakdown     Problem: Pain  Goal: Verbalizes/displays adequate comfort level or baseline comfort level  2/26/2025 0827 by Deepthi Tarango RN  Outcome: Progressing  2/25/2025 2148 by Patricia Krishnan RN  Outcome: Progressing  Flowsheets (Taken 2/24/2025 2330 by Elaine Ibrahim, RN)  Verbalizes/displays adequate comfort level or baseline comfort level:   Encourage patient to monitor pain and request assistance   Assess pain using appropriate pain scale     Problem: Respiratory -

## 2025-02-27 ENCOUNTER — CARE COORDINATION (OUTPATIENT)
Dept: CARE COORDINATION | Age: 65
End: 2025-02-27

## 2025-02-27 DIAGNOSIS — Z98.890 STATUS POST SURGERY: Primary | ICD-10-CM

## 2025-02-27 PROCEDURE — 1111F DSCHRG MED/CURRENT MED MERGE: CPT | Performed by: FAMILY MEDICINE

## 2025-02-27 NOTE — CARE COORDINATION
Care Transitions Note    Initial Call - Call within 2 business days of discharge: Yes    Patient Current Location:  Home: [address] [place] [address] [place] [address]    Care Transition Nurse contacted the patient by telephone to perform post hospital discharge assessment, verified name and  as identifiers. Provided introduction to self, and explanation of the Care Transition Nurse role.     Patient: Gale Kelsey    Patient : 1960   MRN: [id number]    Reason for Admission: SOB, other acute PE  Discharge Date: 25  RURS: Readmission Risk Score: 9.9      Last Discharge Facility       Date Complaint Diagnosis Description Type Department Provider    25 Shortness of Breath Other acute pulmonary embolism without acute cor pulmonale (HCC) ... ED to Hosp-Admission (Discharged) (ADMITTED) Tu Mehta MD; Cliff Katz...            Was this an external facility discharge? No    Additional needs identified to be addressed with provider   No needs identified             Method of communication with provider: none.    Patients top risk factors for readmission: medical condition-pulmonary embolus, degeneration of cervical intervertebral disc, seizure disorder, spinal stenosis in cervical region,  and polypharmacy    Interventions to address risk factors:   Education: discharge instructions, fall and bleeding precautions  Review of patient management of conditions/medications: pulmonary embolus, s/p spinal fusion  Home Health: [facility]     Care Summary Note: Contacted pt for initial care transition follow up.  Gale states so far so good.  Reports home health nurse made a visit today.  Reports shortness of breath still present but has gotten better than it was.  Denies distress.  Denies having any c/o chest pain/discomfort, pressure, tightness, fever, chills, nausea/vomiting.  Reports she may get lightheaded when initially getting up.  Encouraged to avoid any quick or sudden movements, change position

## 2025-02-28 ENCOUNTER — APPOINTMENT (OUTPATIENT)
Dept: GENERAL RADIOLOGY | Age: 65
End: 2025-02-28
Payer: MEDICARE

## 2025-02-28 ENCOUNTER — APPOINTMENT (OUTPATIENT)
Dept: CT IMAGING | Age: 65
End: 2025-02-28
Payer: MEDICARE

## 2025-02-28 ENCOUNTER — HOSPITAL ENCOUNTER (EMERGENCY)
Age: 65
Discharge: HOME | End: 2025-02-28
Attending: EMERGENCY MEDICINE
Payer: MEDICARE

## 2025-02-28 VITALS
SYSTOLIC BLOOD PRESSURE: 109 MMHG | DIASTOLIC BLOOD PRESSURE: 70 MMHG | TEMPERATURE: 98.3 F | RESPIRATION RATE: 26 BRPM | OXYGEN SATURATION: 96 % | HEART RATE: 80 BPM

## 2025-02-28 DIAGNOSIS — E86.0 DEHYDRATION: ICD-10-CM

## 2025-02-28 DIAGNOSIS — R06.02 SHORTNESS OF BREATH: Primary | ICD-10-CM

## 2025-02-28 LAB
ALBUMIN SERPL BCG-MCNC: 3.8 G/DL (ref 3.4–4.9)
ALP SERPL-CCNC: 74 U/L (ref 35–104)
ALT SERPL W/O P-5'-P-CCNC: 9 U/L (ref 10–35)
ANION GAP SERPL CALC-SCNC: 14 MEQ/L (ref 8–16)
AST SERPL-CCNC: 23 U/L (ref 10–35)
BACTERIA SPEC RESP CULT: NORMAL
BASOPHILS ABSOLUTE: 0 THOU/MM3 (ref 0–0.1)
BASOPHILS NFR BLD AUTO: 0.5 %
BILIRUB SERPL-MCNC: 0.7 MG/DL (ref 0.3–1.2)
BUN SERPL-MCNC: 9 MG/DL (ref 8–23)
CALCIUM SERPL-MCNC: 9 MG/DL (ref 8.8–10.2)
CHLORIDE SERPL-SCNC: 100 MEQ/L (ref 98–111)
CO2 SERPL-SCNC: 27 MEQ/L (ref 22–29)
CREAT SERPL-MCNC: 0.9 MG/DL (ref 0.5–0.9)
DEPRECATED RDW RBC AUTO: 53.3 FL (ref 35–45)
EKG ATRIAL RATE: 88 BPM
EKG P AXIS: 36 DEGREES
EKG P-R INTERVAL: 138 MS
EKG Q-T INTERVAL: 374 MS
EKG QRS DURATION: 76 MS
EKG QTC CALCULATION (BAZETT): 452 MS
EKG R AXIS: 39 DEGREES
EKG T AXIS: 53 DEGREES
EKG VENTRICULAR RATE: 88 BPM
EOSINOPHIL NFR BLD AUTO: 1.5 %
EOSINOPHILS ABSOLUTE: 0.1 THOU/MM3 (ref 0–0.4)
ERYTHROCYTE [DISTWIDTH] IN BLOOD BY AUTOMATED COUNT: 16.4 % (ref 11.5–14.5)
GFR SERPL CREATININE-BSD FRML MDRD: 72 ML/MIN/1.73M2
GLUCOSE SERPL-MCNC: 131 MG/DL (ref 74–109)
GRAM STN SPEC: NORMAL
HCT VFR BLD AUTO: 38.4 % (ref 37–47)
HGB BLD-MCNC: 12.6 GM/DL (ref 12–16)
IMM GRANULOCYTES # BLD AUTO: 0.02 THOU/MM3 (ref 0–0.07)
IMM GRANULOCYTES NFR BLD AUTO: 0.3 %
LYMPHOCYTES ABSOLUTE: 2.5 THOU/MM3 (ref 1–4.8)
LYMPHOCYTES NFR BLD AUTO: 34.3 %
MCH RBC QN AUTO: 29.2 PG (ref 26–33)
MCHC RBC AUTO-ENTMCNC: 32.8 GM/DL (ref 32.2–35.5)
MCV RBC AUTO: 89.1 FL (ref 81–99)
MONOCYTES ABSOLUTE: 0.9 THOU/MM3 (ref 0.4–1.3)
MONOCYTES NFR BLD AUTO: 11.6 %
NEUTROPHILS ABSOLUTE: 3.8 THOU/MM3 (ref 1.8–7.7)
NEUTROPHILS NFR BLD AUTO: 51.8 %
NRBC BLD AUTO-RTO: 0 /100 WBC
NT-PROBNP SERPL IA-MCNC: < 36 PG/ML (ref 0–124)
OSMOLALITY SERPL CALC.SUM OF ELEC: 281.8 MOSMOL/KG (ref 275–300)
PLATELET # BLD AUTO: 374 THOU/MM3 (ref 130–400)
PMV BLD AUTO: 10.7 FL (ref 9.4–12.4)
POTASSIUM SERPL-SCNC: 3.6 MEQ/L (ref 3.5–5.2)
PROT SERPL-MCNC: 6.7 G/DL (ref 6.4–8.3)
RBC # BLD AUTO: 4.31 MILL/MM3 (ref 4.2–5.4)
SODIUM SERPL-SCNC: 141 MEQ/L (ref 135–145)
TROPONIN, HIGH SENSITIVITY: 7 NG/L (ref 0–12)
TROPONIN, HIGH SENSITIVITY: < 6 NG/L (ref 0–12)
WBC # BLD AUTO: 7.4 THOU/MM3 (ref 4.8–10.8)

## 2025-02-28 PROCEDURE — 85025 COMPLETE CBC W/AUTO DIFF WBC: CPT

## 2025-02-28 PROCEDURE — 84484 ASSAY OF TROPONIN QUANT: CPT

## 2025-02-28 PROCEDURE — 83880 ASSAY OF NATRIURETIC PEPTIDE: CPT

## 2025-02-28 PROCEDURE — 36415 COLL VENOUS BLD VENIPUNCTURE: CPT

## 2025-02-28 PROCEDURE — 93010 ELECTROCARDIOGRAM REPORT: CPT | Performed by: NUCLEAR MEDICINE

## 2025-02-28 PROCEDURE — 2580000003 HC RX 258: Performed by: EMERGENCY MEDICINE

## 2025-02-28 PROCEDURE — 80053 COMPREHEN METABOLIC PANEL: CPT

## 2025-02-28 PROCEDURE — 93005 ELECTROCARDIOGRAM TRACING: CPT | Performed by: EMERGENCY MEDICINE

## 2025-02-28 PROCEDURE — 99285 EMERGENCY DEPT VISIT HI MDM: CPT

## 2025-02-28 PROCEDURE — 96361 HYDRATE IV INFUSION ADD-ON: CPT

## 2025-02-28 PROCEDURE — 6360000004 HC RX CONTRAST MEDICATION: Performed by: EMERGENCY MEDICINE

## 2025-02-28 PROCEDURE — 71045 X-RAY EXAM CHEST 1 VIEW: CPT

## 2025-02-28 PROCEDURE — 6360000002 HC RX W HCPCS: Performed by: EMERGENCY MEDICINE

## 2025-02-28 PROCEDURE — 96374 THER/PROPH/DIAG INJ IV PUSH: CPT

## 2025-02-28 PROCEDURE — 71275 CT ANGIOGRAPHY CHEST: CPT

## 2025-02-28 RX ORDER — 0.9 % SODIUM CHLORIDE 0.9 %
1000 INTRAVENOUS SOLUTION INTRAVENOUS ONCE
Status: COMPLETED | OUTPATIENT
Start: 2025-02-28 | End: 2025-02-28

## 2025-02-28 RX ORDER — IOPAMIDOL 755 MG/ML
80 INJECTION, SOLUTION INTRAVASCULAR
Status: COMPLETED | OUTPATIENT
Start: 2025-02-28 | End: 2025-02-28

## 2025-02-28 RX ORDER — LORAZEPAM 2 MG/ML
0.5 INJECTION INTRAMUSCULAR ONCE
Status: COMPLETED | OUTPATIENT
Start: 2025-02-28 | End: 2025-02-28

## 2025-02-28 RX ADMIN — SODIUM CHLORIDE 1000 ML: 0.9 INJECTION, SOLUTION INTRAVENOUS at 07:06

## 2025-02-28 RX ADMIN — SODIUM CHLORIDE 1000 ML: 0.9 INJECTION, SOLUTION INTRAVENOUS at 08:53

## 2025-02-28 RX ADMIN — LORAZEPAM 0.5 MG: 2 INJECTION INTRAMUSCULAR; INTRAVENOUS at 06:33

## 2025-02-28 RX ADMIN — IOPAMIDOL 80 ML: 755 INJECTION, SOLUTION INTRAVENOUS at 06:39

## 2025-02-28 ASSESSMENT — PAIN - FUNCTIONAL ASSESSMENT
PAIN_FUNCTIONAL_ASSESSMENT: NONE - DENIES PAIN
PAIN_FUNCTIONAL_ASSESSMENT: NONE - DENIES PAIN

## 2025-02-28 NOTE — ED PROVIDER NOTES
[facility]  EMERGENCY MEDICINE ATTENDING ATTESTATION      Evaluation of Gale Kelsey.   Case discussed and care plan developed with resident physician.   I agree with the resident physician documentation and plan as documented by him, except if my documentation differs.   Patient seen, interviewed and examined by me.  I reviewed the medical, surgical, family and social history, medications and allergies.   I have reviewed and interpreted all available lab, radiology and ekg results available at the moment.  I have reviewed the nursing documentation.     Please see the resident physician completed note for final disposition except as documented on this attestation.   I have reviewed and interpreted all available lab, radiology and ekg results available at the moment.  Diagnosis, treatment and disposition plans were discussed and agreed upon by patient.   This transcription was electronically signed. It was dictated by use of voice recognition software and electronically transcribed. The transcription may contain errors not detected in proofreading.     I performed direct supervision and was present for the critical portion following procedures: None  Critical care time on this case: None    Electronically signed by Felipe Tom MD on 2/28/25 at 7:46 AM Felipe Leach MD  02/28/25 0746

## 2025-02-28 NOTE — DISCHARGE INSTRUCTIONS
You are seen here for shortness of breath and dehydration.  Make sure not to skip any meals and to drink plain fluids including water and something with electrolytes such as Gatorade.  Return here for worsening shortness of breath or developing chest pain.

## 2025-02-28 NOTE — ED NOTES
Patient presents to ED with c/o increased dizziness and weakness. Patient states she was recently hospitalized for pneumonia on the right side. Patient states after getting home she felt dizzy when going from sitting to standing and almost passed out four times while getting up. Patient denies losing LOC. Patient reports bilateral leg weakness. Patient denies chest pain, but reports slight pressure in middle of chest.

## 2025-02-28 NOTE — ED PROVIDER NOTES
RDW-CV 16.4 (*)     RDW-SD 53.3 (*)     All other components within normal limits   COMPREHENSIVE METABOLIC PANEL - Abnormal; Notable for the following components:    Glucose 131 (*)     ALT 9 (*)     All other components within normal limits   TROPONIN   ANION GAP   GLOMERULAR FILTRATION RATE, ESTIMATED   OSMOLALITY   BRAIN NATRIURETIC PEPTIDE   TROPONIN       (Any cultures that may have been sent were not resulted at the time of this patient visit)    MEDICAL DECISION MAKING / ED COURSE:     1) Number and Complexity of Problems            Problem List This Visit:         Chief Complaint   Patient presents with    Dizziness            Differential Diagnosis includes (but not limited to):  Panic attack, PE, pneumonia, ACS, posterior stroke, dehydration, electrolyte abnormality, BPPV, orthostatic intolerance            2)  Treatment and Disposition         ED Reassessment:  See ED course         Shared Decision-Making was performed and disposition discussed with the        Patient/Family and questions answered          Social determinants of health impacting treatment or disposition: N/A         Code Status: Full      Summary of Patient Presentation:      Medical Decision Making  This is an anxious appearing 64-year-old female with a history of anxiety, PE on Eliquis who presents after waking up in the night short of breath and feeling anxious.  Wife first walked in the room the nurse was successfully working on calming her down, it seemed like prior to that she was having a panic attack.  Patient's vitals are within normal limits.  She is able to speak in full sentences, lungs are CTAB.  Given her recent PE and said to get a CT for further evaluation, it appears that it is almost completely resolved.  Patient's neurological exam is reassuring, I think she was having difficulty standing due to dehydration, this had marked improvement after IV fluids.  The rest of her workup is reassuring.    Amount and/or  verbal and written return precautions, instructions and appropriate follow-up provided to  the patient.     ED Medications administered this visit:  (None if blank)  Medications   sodium chloride 0.9 % bolus 1,000 mL (1,000 mLs IntraVENous New Bag 2/28/25 0706)   LORazepam (ATIVAN) injection 0.5 mg (0.5 mg IntraVENous Given 2/28/25 0633)   iopamidol (ISOVUE-370) 76 % injection 80 mL (80 mLs IntraVENous Given 2/28/25 0639)   sodium chloride 0.9 % bolus 1,000 mL (1,000 mLs IntraVENous New Bag 2/28/25 0853)         PROCEDURES: (None if blank)  Procedures:     CRITICAL CARE: (None if blank)      DISCHARGE PRESCRIPTIONS: (None if blank)  New Prescriptions    No medications on file       FINAL IMPRESSION      1. Shortness of breath    2. Dehydration          DISPOSITION/PLAN   DISPOSITION Decision To Discharge 02/28/2025 09:55:00 AM   DISPOSITION CONDITION Stable           OUTPATIENT FOLLOW UP THE PATIENT:  Alex Tomas MD  [address] [place] [address] [address]  [facility] [address]  [phone]    In 2 days  As needed    [facility]  [address] [place] [address]  [phone]    If symptoms worsen      Jasper Sy DO    This transcription was electronically signed. Parts of this transcriptions may have been dictated by use of voice recognition software and electronically transcribed, and parts may have been transcribed with the assistance of an ED scribe. The transcription may contain errors not detected in proofreading.  Please refer to my supervising physician's documentation if my documentation differs.    Electronically Signed: Jasper Sy DO, 02/28/25, 9:55 AM

## 2025-02-28 NOTE — ED NOTES
This RN attempted orthos at this time.When patient was asked to stand to obtain her blood pressure patient fell backward. Patient then sat up, but was unsteady. This RN assisted patient back to bed. Blood pressure did not give a reading during episode.

## 2025-02-28 NOTE — ED NOTES
Upon first contact with patient this RN receives bedside shift report. Patient resting in bed. Respirations easy and unlabored. No distress noted. Call light within reach.

## 2025-03-06 ENCOUNTER — CARE COORDINATION (OUTPATIENT)
Dept: CARE COORDINATION | Age: 65
End: 2025-03-06

## 2025-03-06 NOTE — CARE COORDINATION
Care Transitions Note    Follow Up Call     Patient Current Location:  Home: [address] [place] [address] [place] [address]    Care Transition Nurse contacted the patient by telephone. Verified name and  as identifiers.    Additional needs identified to be addressed with provider   No needs identified                 Method of communication with provider: none.    Care Summary Note: Contacted pt for care transition follow up.  Gale states she is doing much better but shortness of breath still not up to par.  Gets short of breath with minimal exertion.  SpO2 94-95% on RA.  Discussed the importance of maintaining levels above 90%.  Continues to have dizzy episodes as well as the shakes.  Discussed fall precautions and importance of avoiding any quick or sudden movements, changing positions slowly and sit/stand for a few seconds to minutes before moving.  She verbalized understanding.  No c/o chest pain/discomfort, pressure, tightness.  Oral intake is still poor but she is forcing herself to eat.  Encouraged small frequent portions throughout the day.  She has also increased her fluid intake since she was very dehydrated when going to the ED on 25.  States she is drinking at least 100 oz of fluids per day.  Pt reports she had a follow up with PCP yesterday and orthopedic today.  States her incision is healing.  Home health will continue to make visits.  She states the nurse will be back on Tuesday.  No questions or needs at this time.      Plan of care updates since last contact:  Education: importance of changing positions slowly, maintaining SpO2 above 90%, when to contact providers  Home Health: [facility]        Advance Care Planning:   Does patient have an Advance Directive:  not on file .    Medication Review:  No changes since last call.     Remote Patient Monitoring:  Offered patient enrollment in the Remote Patient Monitoring (RPM) program for in-home monitoring: Patient is not eligible for RPM program because: patient

## 2025-03-10 ENCOUNTER — CARE COORDINATION (OUTPATIENT)
Dept: CARE COORDINATION | Age: 65
End: 2025-03-10

## 2025-03-10 NOTE — CARE COORDINATION
Care Transitions Note    Follow Up Call     Attempted to reach patient for transitions of care follow up.  Unable to reach patient.      Outreach Attempts:   HIPAA compliant voicemail left for patient.     Care Summary Note: Attempted to contact pt for care transition follow up.  Unable to reach pt.  Left message with contact information and request for call back.      Follow Up Appointment:       Plan for follow-up call in 2-5 days based on severity of symptoms and risk factors. Plan for next call: symptom management-BP, dizziness/lightheadedness, seizure like activity, SOB, SpO2, any new or worsening symptoms      Antonina Patel RN

## 2025-03-12 ENCOUNTER — CARE COORDINATION (OUTPATIENT)
Dept: CARE COORDINATION | Age: 65
End: 2025-03-12

## 2025-03-12 NOTE — CARE COORDINATION
Care Transitions Note    Follow Up Call     Patient Current Location:  Home: [address] [place] [address] [place] [address]    Care Transition Nurse contacted the patient by telephone. Verified name and  as identifiers.    Additional needs identified to be addressed with provider   No needs identified                 Method of communication with provider: none.    Care Summary Note: Contacted pt for care transition follow up.  Gale states she is doing so much better.  Reports she is getting her strength back.  States if doing to much she may get winded but otherwise doing better.  She is aware of her limits and taking rest periods as needed.  SpO2 running 96-98% on RA.  Denies having any c/o chest pain/discomfort, pressure, tightness, seizure like activity, dizziness/lightheadedness.  Reports BP has been running good.  BP this morning was 117/74.  She will continue to monitor her BP.  She informed CTN that she is drinking plenty of fluids now and making sure she is staying hydrated.  She is aware of when to contact provider.  No questions or needs at this time.  She is agreeable to follow up call in 2 weeks.      Plan of care updates since last contact:  Review of patient management of conditions/medications: dehydration, PE       Advance Care Planning:   Does patient have an Advance Directive:  not on file .    Medication Review:  No changes since last call.     Remote Patient Monitoring:  Offered patient enrollment in the Remote Patient Monitoring (RPM) program for in-home monitoring: Patient is not eligible for RPM program because: patient does not have qualifying diagnosis.    Assessments:  Care Transitions Subsequent and Final Call    Subsequent and Final Calls  Do you have any ongoing symptoms?: Yes  Patient-reported symptoms: Shortness of Breath  Have your medications changed?: No  Do you have any questions related to your medications?: No  Do you currently have any active services?: No  Are you currently active with any

## 2025-03-19 RX ORDER — APIXABAN 5 MG/1
TABLET, FILM COATED ORAL
Qty: 60 TABLET | Refills: 0 | OUTPATIENT
Start: 2025-03-19

## 2025-03-24 ENCOUNTER — CARE COORDINATION (OUTPATIENT)
Dept: CARE COORDINATION | Age: 65
End: 2025-03-24

## 2025-03-24 NOTE — CARE COORDINATION
Care Transitions Note    Follow Up Call     Attempted to reach patient for transitions of care follow up.  Unable to reach patient.      Outreach Attempts:   HIPAA compliant voicemail left for patient.     Care Summary Note: Attempted to contact pt for care transition follow up.  Unable to reach pt.  Left message with contact information and request for call back.      Follow Up Appointment:       Plan for follow-up on next business day.  based on severity of symptoms and risk factors. Plan for next call:  symptom management-any new or worsening symptoms, next call final if stable.        Antonina Patel RN

## 2025-03-25 ENCOUNTER — CARE COORDINATION (OUTPATIENT)
Dept: CARE COORDINATION | Age: 65
End: 2025-03-25

## 2025-03-25 NOTE — CARE COORDINATION
Care Transitions Note    Final Call     Patient Current Location:  Home: [address] [place] [address] [place] [address]    Care Transition Nurse contacted the patient by telephone. Verified name and  as identifiers.    Patient graduated from the Care Transitions program on 3/25/25.  Patient/family has the ability to self-manage at this time..      Advance Care Planning:   Does patient have an Advance Directive:  not on file .    Handoff:   Patient was not referred to the ACM team due to no additional needs identified.       Care Summary Note: Contacted pt for final care transition follow up.  Gale states things are going well.  Reports she may still get short of breath.  Had follow up with PCP the other day.  Pt prescribed Albuterol inhaler as needed.  States she only had to use inhaler once since receiving it.  SpO2 95-97% on RA.  Denies having any c/o chest pain/discomfort, pressure, tightness.  Reports she is doing more around the house and gets very tired.  She is aware of her limits and taking rest periods as needed.  /82 with HR 84.  States HR has been running in the 90's.  Home health nurse is currently with pt.  Nurse will be making 1-2 more visits.  Discussed when to contact providers.  No questions or needs at this time.  Final call.      Assessments:  Care Transitions Subsequent and Final Call    Subsequent and Final Calls  Do you have any ongoing symptoms?: Yes  Patient-reported symptoms: Shortness of Breath  Have your medications changed?: Yes  Patient Reports: started on Albuterol inhaler as needed  Do you have any questions related to your medications?: No  Do you currently have any active services?: Yes  Are you currently active with any services?: Home Health  Do you have any needs or concerns that I can assist you with?: No  Care Transitions Interventions  Other Interventions:              Upcoming Appointments:        Patient has agreed to contact primary care provider and/or specialist for any further

## (undated) DEVICE — SPONGE GZ W4XL4IN COT 12 PLY TYP VII WVN C FLD DSGN STERILE

## (undated) DEVICE — BASIC SINGLE BASIN BTC-LF: Brand: MEDLINE INDUSTRIES, INC.

## (undated) DEVICE — GOWN,AURORA,NON-REINFORCED,2XL: Brand: MEDLINE

## (undated) DEVICE — SUTURE VICRYL + SZ 4-0 L27IN ABSRB WHT FS-2 3/8 CIR REV CUT VCP422H

## (undated) DEVICE — GLOVE ORANGE PI 8 1/2   MSG9085

## (undated) DEVICE — TAPE ADH W4INXL5YD WHT COT E BNDG RUB BASE CURAD

## (undated) DEVICE — PAD,O.R.,TABLE,CONV FOAM,2X20X72: Brand: MEDLINE

## (undated) DEVICE — EVACUATOR SURG 100CC SIL BLB SUCT RESVR FOR CLS WND DRNGE

## (undated) DEVICE — GLOVE SURG SZ 9 THK91MIL LTX FREE SYN POLYISOPRENE ANTI

## (undated) DEVICE — STRIP,CLOSURE,WOUND,MEDI-STRIP,1/2X4: Brand: MEDLINE

## (undated) DEVICE — AGENT HEMOSTATIC SURGIFLOW MATRIX KIT W/THROMBIN

## (undated) DEVICE — MASTISOL ADHESIVE LIQ 2/3ML

## (undated) DEVICE — SUTURE VICRYL + SZ 2-0 L27IN ABSRB UD CP-1 1/2 CIR REV CUT VCP266H

## (undated) DEVICE — SPONGE,NEURO,0.5"X0.5",XR,STRL,10/PK: Brand: MEDLINE

## (undated) DEVICE — GLOVE ORANGE PI 8   MSG9080

## (undated) DEVICE — CAUTERY TIP POLISHER: Brand: DEVON

## (undated) DEVICE — SUTURE NONABSORBABLE 3-0 12X18 IN PRE-CUT VICRYL VIO SUTUPAK VCP104G

## (undated) DEVICE — TAPE,ELASTIC,FOAM,CURAD,4"X5.5YD,LF: Brand: CURAD

## (undated) DEVICE — SUTURE ETHILON SZ 3-0 L18IN NONABSORBABLE BLK L24MM FS-1 3/8 663G

## (undated) DEVICE — Device

## (undated) DEVICE — DRAIN SURG FLAT W7MMXL20CM FULL PERF

## (undated) DEVICE — SUTURE VICRYL + SZ 3-0 L27IN ABSRB UD FS2 3/8 CIR REV CUT

## (undated) DEVICE — GOWN,SIRUS,NON REINFRCD,LARGE,SET IN SL: Brand: MEDLINE

## (undated) DEVICE — PACK-MAJOR

## (undated) DEVICE — DRESSING TRNSPAR W2XL2.75IN FLM SHT SEMIPERMEABLE WIND

## (undated) DEVICE — DRESSING,GAUZE,XEROFORM,CURAD,5"X9",ST: Brand: CURAD

## (undated) DEVICE — PENCIL SMK EVAC ALL IN 1 DSGN ENH VISIBILITY IMPROVED AIR

## (undated) DEVICE — SUTURE VICRYL + 1 L27IN ABSRB UD CT-1 L36MM 1/2 CIR TAPR PNT VCP261H

## (undated) DEVICE — GOWN,SIRUS,NONRNF,SETINSLV,XL,20/CS: Brand: MEDLINE

## (undated) DEVICE — CORD,CAUTERY,BIPOLAR,STERILE: Brand: MEDLINE